# Patient Record
Sex: MALE | Race: WHITE | NOT HISPANIC OR LATINO | Employment: OTHER | URBAN - METROPOLITAN AREA
[De-identification: names, ages, dates, MRNs, and addresses within clinical notes are randomized per-mention and may not be internally consistent; named-entity substitution may affect disease eponyms.]

---

## 2019-07-07 PROCEDURE — 87086 URINE CULTURE/COLONY COUNT: CPT | Performed by: NURSE PRACTITIONER

## 2019-07-07 PROCEDURE — 87186 SC STD MICRODIL/AGAR DIL: CPT | Performed by: NURSE PRACTITIONER

## 2019-07-07 PROCEDURE — 87088 URINE BACTERIA CULTURE: CPT | Performed by: NURSE PRACTITIONER

## 2019-07-09 ENCOUNTER — TELEPHONE (OUTPATIENT)
Dept: URGENT CARE | Facility: CLINIC | Age: 69
End: 2019-07-09

## 2019-07-09 NOTE — TELEPHONE ENCOUNTER
Spoke with Pt informed urine culture grew bacteria and the antibiotic prescribed should treat that. Pt verbalized understanding stated he was still having slight burning with urination. Informed Pt to complete antibiotic and if symptoms still persists follow up with PCP. Pt verbalized understanding no further questions

## 2019-07-17 ENCOUNTER — TRANSCRIBE ORDERS (OUTPATIENT)
Dept: ADMINISTRATIVE | Facility: HOSPITAL | Age: 69
End: 2019-07-17

## 2019-07-17 DIAGNOSIS — R31.9 HEMATURIA, UNSPECIFIED TYPE: Primary | ICD-10-CM

## 2019-07-30 ENCOUNTER — APPOINTMENT (OUTPATIENT)
Dept: CT IMAGING | Facility: HOSPITAL | Age: 69
End: 2019-07-30

## 2019-11-22 ENCOUNTER — HOSPITAL ENCOUNTER (OUTPATIENT)
Dept: GENERAL RADIOLOGY | Facility: HOSPITAL | Age: 69
Discharge: HOME | End: 2019-11-22
Admitting: FAMILY MEDICINE

## 2019-11-22 ENCOUNTER — TRANSCRIBE ORDERS (OUTPATIENT)
Dept: ADMINISTRATIVE | Facility: HOSPITAL | Age: 69
End: 2019-11-22

## 2019-11-22 DIAGNOSIS — I10 BENIGN ESSENTIAL HTN: Primary | ICD-10-CM

## 2019-11-22 DIAGNOSIS — I10 BENIGN ESSENTIAL HTN: ICD-10-CM

## 2019-11-22 PROCEDURE — 71046 X-RAY EXAM CHEST 2 VIEWS: CPT

## 2020-10-15 ENCOUNTER — HOSPITAL ENCOUNTER (OUTPATIENT)
Dept: GENERAL RADIOLOGY | Facility: HOSPITAL | Age: 70
Discharge: HOME | End: 2020-10-15
Admitting: FAMILY MEDICINE

## 2020-10-15 ENCOUNTER — TRANSCRIBE ORDERS (OUTPATIENT)
Dept: ADMINISTRATIVE | Facility: HOSPITAL | Age: 70
End: 2020-10-15

## 2020-10-15 DIAGNOSIS — M16.12 OSTEOARTHRITIS OF LEFT HIP, UNSPECIFIED OSTEOARTHRITIS TYPE: Primary | ICD-10-CM

## 2020-10-15 PROCEDURE — 73502 X-RAY EXAM HIP UNI 2-3 VIEWS: CPT

## 2021-06-25 ENCOUNTER — OFFICE VISIT (OUTPATIENT)
Dept: NEUROSURGERY | Facility: CLINIC | Age: 71
End: 2021-06-25

## 2021-06-25 VITALS — BODY MASS INDEX: 24.79 KG/M2 | HEIGHT: 70 IN | WEIGHT: 173.2 LBS | TEMPERATURE: 97.5 F

## 2021-06-25 DIAGNOSIS — M50.30 DDD (DEGENERATIVE DISC DISEASE), CERVICAL: ICD-10-CM

## 2021-06-25 DIAGNOSIS — M47.12 CERVICAL SPONDYLOSIS WITH MYELOPATHY: Primary | ICD-10-CM

## 2021-06-25 DIAGNOSIS — M48.02 CERVICAL SPINAL STENOSIS: ICD-10-CM

## 2021-06-25 PROCEDURE — 99203 OFFICE O/P NEW LOW 30 MIN: CPT | Performed by: NEUROLOGICAL SURGERY

## 2021-06-25 RX ORDER — LOSARTAN POTASSIUM 50 MG/1
50 TABLET ORAL DAILY
COMMUNITY
Start: 2021-05-30

## 2021-06-25 RX ORDER — TAMSULOSIN HYDROCHLORIDE 0.4 MG/1
1 CAPSULE ORAL
COMMUNITY
Start: 2021-05-30 | End: 2022-10-30

## 2021-06-25 RX ORDER — ASPIRIN 81 MG/1
81 TABLET, CHEWABLE ORAL DAILY
COMMUNITY
End: 2022-12-30

## 2021-06-25 RX ORDER — METRONIDAZOLE 10 MG/G
GEL TOPICAL
COMMUNITY
Start: 2021-04-15 | End: 2021-06-25

## 2021-06-25 RX ORDER — DIPHENOXYLATE HYDROCHLORIDE AND ATROPINE SULFATE 2.5; .025 MG/1; MG/1
1 TABLET ORAL DAILY
COMMUNITY

## 2021-06-25 RX ORDER — HYDROCHLOROTHIAZIDE 12.5 MG/1
12.5 CAPSULE, GELATIN COATED ORAL DAILY
COMMUNITY
Start: 2021-05-30

## 2021-06-25 RX ORDER — ASCORBIC ACID 500 MG
500 TABLET ORAL DAILY
COMMUNITY

## 2021-06-25 NOTE — PROGRESS NOTES
Patient: Vladimir Haq  : 1950    Primary Care Provider: Jim Lopez MD    Requesting Provider: As above        History    Chief Complaint: Gait dysfunction.    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  At that time he was having left arm and leg symptoms.  ACDF at C4-5 was pursued.  Over the last 6 months he developed more gait problems.  He has no arm weakness, numbness, pain.  He denies any bowel or bladder dysfunction.  He is not sure what makes his symptoms better or worse.  He tends to perform less well when he is fatigued.    Review of Systems   Constitutional: Negative for activity change, appetite change, chills, diaphoresis, fatigue, fever and unexpected weight change.   HENT: Negative for congestion, dental problem, drooling, ear discharge, ear pain, facial swelling, hearing loss, mouth sores, nosebleeds, postnasal drip, rhinorrhea, sinus pressure, sinus pain, sneezing, sore throat, tinnitus, trouble swallowing and voice change.    Eyes: Negative for photophobia, pain, discharge, redness, itching and visual disturbance.   Respiratory: Negative for apnea, cough, choking, chest tightness, shortness of breath, wheezing and stridor.    Cardiovascular: Negative for chest pain, palpitations and leg swelling.   Gastrointestinal: Negative for abdominal distention, abdominal pain, anal bleeding, blood in stool, constipation, diarrhea, nausea, rectal pain and vomiting.   Endocrine: Negative for cold intolerance, heat intolerance, polydipsia, polyphagia and polyuria.   Genitourinary: Negative for decreased urine volume, difficulty urinating, dysuria, enuresis, flank pain, frequency, genital sores, hematuria and urgency.   Musculoskeletal: Negative for arthralgias, back pain, gait problem, joint swelling, myalgias, neck pain and neck stiffness.   Skin: Negative for color change, pallor,  "rash and wound.   Allergic/Immunologic: Negative for environmental allergies, food allergies and immunocompromised state.   Neurological: Positive for weakness. Negative for dizziness, tremors, seizures, syncope, facial asymmetry, speech difficulty, light-headedness, numbness and headaches.   Hematological: Negative for adenopathy. Does not bruise/bleed easily.   Psychiatric/Behavioral: Negative for agitation, behavioral problems, confusion, decreased concentration, dysphoric mood, hallucinations, self-injury, sleep disturbance and suicidal ideas. The patient is not nervous/anxious and is not hyperactive.    All other systems reviewed and are negative.      The patient's past medical history, past surgical history, family history, and social history have been reviewed at length in the electronic medical record.    Physical Exam:   Temp 97.5 °F (36.4 °C)   Ht 177.8 cm (70\")   Wt 78.6 kg (173 lb 3.2 oz)   BMI 24.85 kg/m²   CONSTITUTIONAL: Patient is well-nourished, pleasant and appears stated age.  CV: Heart regular rate and rhythm without murmur, rub, or gallop.  PULMONARY: Lungs are clear to ascultation.  MUSCULOSKELETAL:  Neck tenderness to palpation is not observed.   ROM in the neck is normal.  NEUROLOGICAL:  Orientation, memory, attention span, language function, and cognition have been examined and are intact.  Strength is intact in the upper and lower extremities to direct testing.  Muscle tone is normal throughout.  His gait is spastic.  Sensation is intact to light touch testing throughout.  Deep tendon reflexes are 2+ in the upper extremities except the left tricep reflex which is quite brisk.  Lower extremity reflexes are brisk throughout..  Alcira's Sign is negative bilaterally. No clonus is elicited.  Coordination is intact.      Medical Decision Making    Data Review:   (All imaging studies were personally reviewed unless stated otherwise)  MRI of the cervical spine demonstrates instrumented fusion " that is solid at C4-5.  There are some subtle signal change within the cord at that level.  There is stenosis at C3-4, C5-6, and C6-7.  There is modest signal change in the cord at C3-4.  There is a low-grade offset of C6 on C7.    Diagnosis:   Cervical myelopathy due to spondylosis and stenosis.    Treatment Options:   For completeness I am going to check a thoracic MRI to make sure that he does not have another compressive region.  If not then he is likely going to require cervical decompression with fusion and stabilization from C3-C7 dorsally.  He does have travel planned at the end of August.       Diagnosis Plan   1. Cervical spondylosis with myelopathy  MRI Thoracic Spine Without Contrast   2. Cervical spinal stenosis     3. DDD (degenerative disc disease), cervical         Scribed for Osmani Mckeon MD by TERRI Gomez 6/25/2021 11:28 EDT      I, Dr. Mckeon, personally performed the services described in the documentation, as scribed in my presence, and it is both accurate and complete.

## 2021-06-27 ENCOUNTER — HOSPITAL ENCOUNTER (OUTPATIENT)
Dept: MRI IMAGING | Facility: HOSPITAL | Age: 71
Discharge: HOME | End: 2021-06-27
Admitting: NEUROLOGICAL SURGERY

## 2021-06-27 DIAGNOSIS — M47.12 CERVICAL SPONDYLOSIS WITH MYELOPATHY: ICD-10-CM

## 2021-06-27 PROCEDURE — 72146 MRI CHEST SPINE W/O DYE: CPT

## 2021-07-02 ENCOUNTER — PREP FOR SURGERY (OUTPATIENT)
Dept: OTHER | Facility: HOSPITAL | Age: 71
End: 2021-07-02

## 2021-07-02 ENCOUNTER — OFFICE VISIT (OUTPATIENT)
Dept: NEUROSURGERY | Facility: CLINIC | Age: 71
End: 2021-07-02

## 2021-07-02 VITALS — BODY MASS INDEX: 24.77 KG/M2 | HEIGHT: 70 IN | TEMPERATURE: 97.5 F | WEIGHT: 173 LBS

## 2021-07-02 DIAGNOSIS — M53.9 MULTILEVEL DEGENERATIVE DISC DISEASE: ICD-10-CM

## 2021-07-02 DIAGNOSIS — R26.9 NEUROLOGIC GAIT DYSFUNCTION: ICD-10-CM

## 2021-07-02 DIAGNOSIS — M47.12 CERVICAL SPONDYLOSIS WITH MYELOPATHY: ICD-10-CM

## 2021-07-02 DIAGNOSIS — M48.02 CERVICAL SPINAL STENOSIS: Primary | ICD-10-CM

## 2021-07-02 DIAGNOSIS — M47.12 CERVICAL SPONDYLOSIS WITH MYELOPATHY: Primary | ICD-10-CM

## 2021-07-02 PROCEDURE — 99214 OFFICE O/P EST MOD 30 MIN: CPT | Performed by: NEUROLOGICAL SURGERY

## 2021-07-02 RX ORDER — OXYCODONE HCL 10 MG/1
10 TABLET, FILM COATED, EXTENDED RELEASE ORAL ONCE
Status: CANCELLED | OUTPATIENT
Start: 2021-07-02 | End: 2021-07-02

## 2021-07-02 RX ORDER — ACETAMINOPHEN 500 MG
1000 TABLET ORAL ONCE
Status: CANCELLED | OUTPATIENT
Start: 2021-07-02 | End: 2021-07-02

## 2021-07-02 RX ORDER — IBUPROFEN 800 MG/1
800 TABLET ORAL ONCE
Status: CANCELLED | OUTPATIENT
Start: 2021-07-02 | End: 2021-07-02

## 2021-07-02 RX ORDER — FAMOTIDINE 20 MG/1
20 TABLET, FILM COATED ORAL
Status: CANCELLED | OUTPATIENT
Start: 2021-07-02

## 2021-07-02 RX ORDER — CEFAZOLIN SODIUM 2 G/100ML
2 INJECTION, SOLUTION INTRAVENOUS ONCE
Status: CANCELLED | OUTPATIENT
Start: 2021-07-02 | End: 2021-07-02

## 2021-07-02 NOTE — PROGRESS NOTES
Patient: Vladimir Haq  : 1950    Primary Care Provider: Jim Lopez MD    Requesting Provider: As above        History    Chief Complaint: Gait dysfunction.    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  At that time he was having left arm and leg symptoms.  ACDF at C4-5 was pursued.  Over the last 6 months he developed more gait problems.  He has no arm weakness, numbness, pain.  He denies any bowel or bladder dysfunction.  He is not sure what makes his symptoms better or worse.  He tends to perform less well when he is fatigued.  He reports no change in his symptoms.    Review of Systems   Constitutional: Negative for activity change, appetite change, chills, diaphoresis, fatigue, fever and unexpected weight change.   HENT: Negative for congestion, dental problem, drooling, ear discharge, ear pain, facial swelling, hearing loss, mouth sores, nosebleeds, postnasal drip, rhinorrhea, sinus pressure, sinus pain, sneezing, sore throat, tinnitus, trouble swallowing and voice change.    Eyes: Negative for photophobia, pain, discharge, redness, itching and visual disturbance.   Respiratory: Negative for apnea, cough, choking, chest tightness, shortness of breath, wheezing and stridor.    Cardiovascular: Negative for chest pain, palpitations and leg swelling.   Gastrointestinal: Negative for abdominal distention, abdominal pain, anal bleeding, blood in stool, constipation, diarrhea, nausea, rectal pain and vomiting.   Endocrine: Negative for cold intolerance, heat intolerance, polydipsia, polyphagia and polyuria.   Genitourinary: Negative for decreased urine volume, difficulty urinating, dysuria, enuresis, flank pain, frequency, genital sores, hematuria and urgency.   Musculoskeletal: Negative for arthralgias, back pain, gait problem, joint swelling, myalgias, neck pain and neck stiffness.  "  Skin: Negative for color change, pallor, rash and wound.   Allergic/Immunologic: Negative for environmental allergies, food allergies and immunocompromised state.   Neurological: Positive for weakness. Negative for dizziness, tremors, seizures, syncope, facial asymmetry, speech difficulty, light-headedness, numbness and headaches.   Hematological: Negative for adenopathy. Does not bruise/bleed easily.   Psychiatric/Behavioral: Negative for agitation, behavioral problems, confusion, decreased concentration, dysphoric mood, hallucinations, self-injury, sleep disturbance and suicidal ideas. The patient is not nervous/anxious and is not hyperactive.    All other systems reviewed and are negative.      The patient's past medical history, past surgical history, family history, and social history have been reviewed at length in the electronic medical record.    Physical Exam:   Temp 97.5 °F (36.4 °C) (Infrared)   Ht 177.8 cm (70\")   Wt 78.5 kg (173 lb)   BMI 24.82 kg/m²   MUSCULOSKELETAL:  Neck tenderness to palpation is not observed.   ROM in the neck is normal.  NEUROLOGICAL:  Strength is intact in the upper and lower extremities to direct testing.  Muscle tone is normal throughout.  Station and gait are somewhat spastic.  Deep tendon reflexes are 1+ and symmetrical.  Alcira's Sign is negative bilaterally.       Medical Decision Making    Data Review:   (All imaging studies were personally reviewed unless stated otherwise)  Thoracic MRI study demonstrates some kyphosis with diffuse degenerative disc disease and spondylosis.  There is no cord compromise.    MRI of the cervical spine demonstrates instrumented fusion that is solid at C4-5.  There are some subtle signal change within the cord at that level.  There is stenosis at C3-4, C5-6, and C6-7.  There is modest signal change in the cord at C3-4.  There is a low-grade offset of C6 on C7.    Diagnosis:   Cervical spondylosis and stenosis with " myelopathy.    Treatment Options:   I have recommended C3-7 decompression with fusion and stabilization.  The goal of surgery is to stabilize his myelopathy.  Surgery may or may not correct his current gait difficulties.  The nature of the procedure as well as the potential risks, complications, limitations, and alternatives to the procedure were discussed at length with the patient and the patient has agreed to proceed with surgery.  He does have a trip planned at the end of August and we will try and work around that.       Diagnosis Plan   1. Cervical spinal stenosis     2. Neurologic gait dysfunction     3. Cervical spondylosis with myelopathy     4. Multilevel degenerative disc disease         Scribed for Osmani Mckeon MD by Tari Arias CMA. 7/2/2021 16:11 EDT    I, Dr. Mckeon, personally performed the services described in the documentation, as scribed in my presence, and it is both accurate and complete.

## 2021-07-02 NOTE — H&P
Patient: Vladimir Haq  : 1950     Primary Care Provider: Jim Lopez MD     Requesting Provider: As above           History     Chief Complaint: Gait dysfunction.     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  At that time he was having left arm and leg symptoms.  ACDF at C4-5 was pursued.  Over the last 6 months he developed more gait problems.  He has no arm weakness, numbness, pain.  He denies any bowel or bladder dysfunction.  He is not sure what makes his symptoms better or worse.  He tends to perform less well when he is fatigued.  He reports no change in his symptoms.     Review of Systems   Constitutional: Negative for activity change, appetite change, chills, diaphoresis, fatigue, fever and unexpected weight change.   HENT: Negative for congestion, dental problem, drooling, ear discharge, ear pain, facial swelling, hearing loss, mouth sores, nosebleeds, postnasal drip, rhinorrhea, sinus pressure, sinus pain, sneezing, sore throat, tinnitus, trouble swallowing and voice change.    Eyes: Negative for photophobia, pain, discharge, redness, itching and visual disturbance.   Respiratory: Negative for apnea, cough, choking, chest tightness, shortness of breath, wheezing and stridor.    Cardiovascular: Negative for chest pain, palpitations and leg swelling.   Gastrointestinal: Negative for abdominal distention, abdominal pain, anal bleeding, blood in stool, constipation, diarrhea, nausea, rectal pain and vomiting.   Endocrine: Negative for cold intolerance, heat intolerance, polydipsia, polyphagia and polyuria.   Genitourinary: Negative for decreased urine volume, difficulty urinating, dysuria, enuresis, flank pain, frequency, genital sores, hematuria and urgency.   Musculoskeletal: Negative for arthralgias, back pain, gait problem, joint swelling, myalgias, neck pain and neck  stiffness.   Skin: Negative for color change, pallor, rash and wound.   Allergic/Immunologic: Negative for environmental allergies, food allergies and immunocompromised state.   Neurological: Positive for weakness. Negative for dizziness, tremors, seizures, syncope, facial asymmetry, speech difficulty, light-headedness, numbness and headaches.   Hematological: Negative for adenopathy. Does not bruise/bleed easily.   Psychiatric/Behavioral: Negative for agitation, behavioral problems, confusion, decreased concentration, dysphoric mood, hallucinations, self-injury, sleep disturbance and suicidal ideas. The patient is not nervous/anxious and is not hyperactive.    All other systems reviewed and are negative.        The patient's past medical history, past surgical history, family history, and social history have been reviewed at length in the electronic medical record.     Past Medical History:   Diagnosis Date   • Arthritis    • Hyperlipidemia      Past Surgical History:   Procedure Laterality Date   • CERVICAL DISC SURGERY  2002    Dr. Robbins, [facility]      Family History   Problem Relation Age of Onset   • Cancer Mother    • Stroke Father      Social History     Socioeconomic History   • Marital status:      Spouse name: Not on file   • Number of children: Not on file   • Years of education: Not on file   • Highest education level: Not on file   Tobacco Use   • Smoking status: Never Smoker   • Smokeless tobacco: Never Used   Vaping Use   • Vaping Use: Never used   Substance and Sexual Activity   • Alcohol use: Yes     Comment: 14 drinks per week.   • Drug use: Never   • Sexual activity: Defer       No Known Allergies    Current Outpatient Medications on File Prior to Visit   Medication Sig Dispense Refill   • ascorbic acid (VITAMIN C) 500 MG tablet Take 500 mg by mouth Daily.     • aspirin 81 MG chewable tablet Chew 81 mg Daily.     • hydroCHLOROthiazide (MICROZIDE) 12.5 MG capsule Take 12.5 mg by  "mouth Daily.     • losartan (COZAAR) 50 MG tablet Take 50 mg by mouth Daily.     • lovastatin (ALTOPREV) 40 MG 24 hr tablet Take 40 mg by mouth Every Night.     • multivitamin (MULTI VITAMIN PO) Take 1 tablet by mouth Daily.     • tamsulosin (FLOMAX) 0.4 MG capsule 24 hr capsule Take 1 capsule by mouth every night at bedtime.       No current facility-administered medications on file prior to visit.        Physical Exam:   Temp 97.5 °F (36.4 °C) (Infrared)   Ht 177.8 cm (70\")   Wt 78.5 kg (173 lb)   BMI 24.82 kg/m²   MUSCULOSKELETAL:  Neck tenderness to palpation is not observed.   ROM in the neck is normal.  NEUROLOGICAL:  Strength is intact in the upper and lower extremities to direct testing.  Muscle tone is normal throughout.  Station and gait are somewhat spastic.  Deep tendon reflexes are 1+ and symmetrical.  Alcira's Sign is negative bilaterally.         Medical Decision Making     Data Review:   (All imaging studies were personally reviewed unless stated otherwise)  Thoracic MRI study demonstrates some kyphosis with diffuse degenerative disc disease and spondylosis.  There is no cord compromise.     MRI of the cervical spine demonstrates instrumented fusion that is solid at C4-5.  There are some subtle signal change within the cord at that level.  There is stenosis at C3-4, C5-6, and C6-7.  There is modest signal change in the cord at C3-4.  There is a low-grade offset of C6 on C7.     Diagnosis:   Cervical spondylosis and stenosis with myelopathy.     Treatment Options:   I have recommended C3-7 decompression with fusion and stabilization.  The goal of surgery is to stabilize his myelopathy.  Surgery may or may not correct his current gait difficulties.  The nature of the procedure as well as the potential risks, complications, limitations, and alternatives to the procedure were discussed at length with the patient and the patient has agreed to proceed with surgery.  He does have a trip planned at the " end of August and we will try and work around that.          Diagnosis Plan   1. Cervical spinal stenosis      2. Neurologic gait dysfunction      3. Cervical spondylosis with myelopathy      4. Multilevel degenerative disc disease

## 2021-07-20 ENCOUNTER — TELEPHONE (OUTPATIENT)
Dept: NEUROSURGERY | Facility: CLINIC | Age: 71
End: 2021-07-20

## 2021-07-20 RX ORDER — CHLORHEXIDINE GLUCONATE 4 G/100ML
SOLUTION TOPICAL DAILY
Qty: 120 ML | Refills: 0 | Status: ON HOLD | OUTPATIENT
Start: 2021-07-20 | End: 2021-08-12

## 2021-07-20 NOTE — TELEPHONE ENCOUNTER
Patient was not able to open his computer to retrieve all his questions, however the main one was is he going to be able to turn his head after the fusion?    Once he is able to open his notes, he will leave the other questions on the voice mail.

## 2021-07-20 NOTE — TELEPHONE ENCOUNTER
"Patient called about his prescriptions. I tried to get specifics about what type of questions he had. He just said \"I have more questions\". His call back number is [phone].  "

## 2021-07-21 NOTE — TELEPHONE ENCOUNTER
Yes, he will still have ROM   - may be slightly less, however goal of surgery is to stabilize his myelopathy and prevent decline

## 2021-07-28 ENCOUNTER — TELEPHONE (OUTPATIENT)
Dept: NEUROSURGERY | Facility: CLINIC | Age: 71
End: 2021-07-28

## 2021-07-28 NOTE — TELEPHONE ENCOUNTER
Called and answered all questions.    He was thankful for the call.    We will see him in surgery.

## 2021-08-10 ENCOUNTER — PRE-ADMISSION TESTING (OUTPATIENT)
Dept: PREADMISSION TESTING | Facility: HOSPITAL | Age: 71
End: 2021-08-10

## 2021-08-10 VITALS — WEIGHT: 171.96 LBS | HEIGHT: 70 IN | BODY MASS INDEX: 24.62 KG/M2

## 2021-08-10 LAB
DEPRECATED RDW RBC AUTO: 44.8 FL (ref 37–54)
ERYTHROCYTE [DISTWIDTH] IN BLOOD BY AUTOMATED COUNT: 13.6 % (ref 12.3–15.4)
HBA1C MFR BLD: 5.5 % (ref 4.8–5.6)
HCT VFR BLD AUTO: 45.5 % (ref 37.5–51)
HGB BLD-MCNC: 15.5 G/DL (ref 13–17.7)
MCH RBC QN AUTO: 30.3 PG (ref 26.6–33)
MCHC RBC AUTO-ENTMCNC: 34.1 G/DL (ref 31.5–35.7)
MCV RBC AUTO: 88.9 FL (ref 79–97)
MRSA DNA SPEC QL NAA+PROBE: NEGATIVE
PLATELET # BLD AUTO: 237 10*3/MM3 (ref 140–450)
PMV BLD AUTO: 9.8 FL (ref 6–12)
POTASSIUM SERPL-SCNC: 3.9 MMOL/L (ref 3.5–5.2)
RBC # BLD AUTO: 5.12 10*6/MM3 (ref 4.14–5.8)
SARS-COV-2 RNA PNL SPEC NAA+PROBE: NOT DETECTED
WBC # BLD AUTO: 5.53 10*3/MM3 (ref 3.4–10.8)

## 2021-08-10 PROCEDURE — 84132 ASSAY OF SERUM POTASSIUM: CPT

## 2021-08-10 PROCEDURE — 85027 COMPLETE CBC AUTOMATED: CPT

## 2021-08-10 PROCEDURE — 83036 HEMOGLOBIN GLYCOSYLATED A1C: CPT

## 2021-08-10 PROCEDURE — U0004 COV-19 TEST NON-CDC HGH THRU: HCPCS

## 2021-08-10 PROCEDURE — C9803 HOPD COVID-19 SPEC COLLECT: HCPCS

## 2021-08-10 PROCEDURE — 36415 COLL VENOUS BLD VENIPUNCTURE: CPT

## 2021-08-10 PROCEDURE — 87641 MR-STAPH DNA AMP PROBE: CPT

## 2021-08-10 RX ORDER — METRONIDAZOLE 10 MG/G
1 GEL TOPICAL DAILY PRN
COMMUNITY
End: 2022-10-30

## 2021-08-10 NOTE — PAT
Patient to apply Chlorhexadine wipes  to surgical area (as instructed) the night before procedure and the AM of procedure. Wipes provided.    Patient instructed to drink 20 ounces (or until full) of Gatorade and it needs to be completed 1 hour (for Main OR patients) or 2 hours (scheduled  section patients) before given arrival time for procedure (NO RED Gatorade)    Patient verbalized understanding.    Bactroban and Chlorhexidine Prescription prescribed by physician before PAT visit.  Verified with patient that medications were picked up from their pharmacy.  Written instructions given to patient during PAT visit.  Patient/family also instructed to complete skin prep checklist and return the checklist on the day of surgery to preoperative staff.  Patient/family verbalized understanding.    Per Anesthesia Request, patient instructed not to take their ACE/ARB medications on the AM of surgery.

## 2021-08-12 ENCOUNTER — ANESTHESIA (OUTPATIENT)
Dept: PERIOP | Facility: HOSPITAL | Age: 71
End: 2021-08-12

## 2021-08-12 ENCOUNTER — APPOINTMENT (OUTPATIENT)
Dept: GENERAL RADIOLOGY | Facility: HOSPITAL | Age: 71
End: 2021-08-12

## 2021-08-12 ENCOUNTER — HOSPITAL ENCOUNTER (INPATIENT)
Facility: HOSPITAL | Age: 71
LOS: 2 days | Discharge: HOME | End: 2021-08-14
Attending: NEUROLOGICAL SURGERY | Admitting: NEUROLOGICAL SURGERY

## 2021-08-12 ENCOUNTER — ANESTHESIA EVENT (OUTPATIENT)
Dept: PERIOP | Facility: HOSPITAL | Age: 71
End: 2021-08-12

## 2021-08-12 DIAGNOSIS — M47.12 CERVICAL SPONDYLOSIS WITH MYELOPATHY: ICD-10-CM

## 2021-08-12 PROCEDURE — 25010000003 CEFAZOLIN IN DEXTROSE 2-4 GM/100ML-% SOLUTION: Performed by: NEUROLOGICAL SURGERY

## 2021-08-12 PROCEDURE — C1713 ANCHOR/SCREW BN/BN,TIS/BN: HCPCS | Performed by: NEUROLOGICAL SURGERY

## 2021-08-12 PROCEDURE — 63048 LAM FACETEC &FORAMOT EA ADDL: CPT | Performed by: PHYSICIAN ASSISTANT

## 2021-08-12 PROCEDURE — 0RG2071 FUSION OF 2 OR MORE CERVICAL VERTEBRAL JOINTS WITH AUTOLOGOUS TISSUE SUBSTITUTE, POSTERIOR APPROACH, POSTERIOR COLUMN, OPEN APPROACH: ICD-10-PCS | Performed by: NEUROLOGICAL SURGERY

## 2021-08-12 PROCEDURE — 25010000002 DEXAMETHASONE PER 1 MG: Performed by: NURSE ANESTHETIST, CERTIFIED REGISTERED

## 2021-08-12 PROCEDURE — 63048 LAM FACETEC &FORAMOT EA ADDL: CPT | Performed by: NEUROLOGICAL SURGERY

## 2021-08-12 PROCEDURE — C1889 IMPLANT/INSERT DEVICE, NOC: HCPCS | Performed by: NEUROLOGICAL SURGERY

## 2021-08-12 PROCEDURE — 25010000002 VANCOMYCIN 1 G RECONSTITUTED SOLUTION: Performed by: NEUROLOGICAL SURGERY

## 2021-08-12 PROCEDURE — A9270 NON-COVERED ITEM OR SERVICE: HCPCS | Performed by: ANESTHESIOLOGY

## 2021-08-12 PROCEDURE — A9270 NON-COVERED ITEM OR SERVICE: HCPCS | Performed by: NEUROLOGICAL SURGERY

## 2021-08-12 PROCEDURE — 22842 INSERT SPINE FIXATION DEVICE: CPT | Performed by: NEUROLOGICAL SURGERY

## 2021-08-12 PROCEDURE — 22600 ARTHRD PST TQ 1NTRSPC CRV: CPT | Performed by: PHYSICIAN ASSISTANT

## 2021-08-12 PROCEDURE — 25010000003 BUPIVACAINE LIPOSOME 1.3 % SUSPENSION: Performed by: NEUROLOGICAL SURGERY

## 2021-08-12 PROCEDURE — 63710000001 OXYCODONE 10 MG TABLET EXTENDED-RELEASE 12 HOUR: Performed by: NEUROLOGICAL SURGERY

## 2021-08-12 PROCEDURE — 25010000002 ONDANSETRON PER 1 MG: Performed by: NEUROLOGICAL SURGERY

## 2021-08-12 PROCEDURE — C9290 INJ, BUPIVACAINE LIPOSOME: HCPCS | Performed by: NEUROLOGICAL SURGERY

## 2021-08-12 PROCEDURE — 22614 ARTHRD PST TQ 1NTRSPC EA ADD: CPT | Performed by: PHYSICIAN ASSISTANT

## 2021-08-12 PROCEDURE — 25010000002 FENTANYL CITRATE (PF) 50 MCG/ML SOLUTION: Performed by: NURSE ANESTHETIST, CERTIFIED REGISTERED

## 2021-08-12 PROCEDURE — 61783 SCAN PROC SPINAL: CPT | Performed by: NEUROLOGICAL SURGERY

## 2021-08-12 PROCEDURE — 00NW0ZZ RELEASE CERVICAL SPINAL CORD, OPEN APPROACH: ICD-10-PCS | Performed by: NEUROLOGICAL SURGERY

## 2021-08-12 PROCEDURE — 63047 LAM FACETEC & FORAMOT LUMBAR: CPT | Performed by: NEUROLOGICAL SURGERY

## 2021-08-12 PROCEDURE — 63710000001 FAMOTIDINE 20 MG TABLET: Performed by: ANESTHESIOLOGY

## 2021-08-12 PROCEDURE — 25010000002 ONDANSETRON PER 1 MG: Performed by: NURSE ANESTHETIST, CERTIFIED REGISTERED

## 2021-08-12 PROCEDURE — 22614 ARTHRD PST TQ 1NTRSPC EA ADD: CPT | Performed by: NEUROLOGICAL SURGERY

## 2021-08-12 PROCEDURE — 22842 INSERT SPINE FIXATION DEVICE: CPT | Performed by: PHYSICIAN ASSISTANT

## 2021-08-12 PROCEDURE — 25010000002 PROPOFOL 10 MG/ML EMULSION: Performed by: NURSE ANESTHETIST, CERTIFIED REGISTERED

## 2021-08-12 PROCEDURE — 63710000001 IBUPROFEN 800 MG TABLET: Performed by: NEUROLOGICAL SURGERY

## 2021-08-12 PROCEDURE — 63710000001 ACETAMINOPHEN 500 MG TABLET: Performed by: NEUROLOGICAL SURGERY

## 2021-08-12 PROCEDURE — 22600 ARTHRD PST TQ 1NTRSPC CRV: CPT | Performed by: NEUROLOGICAL SURGERY

## 2021-08-12 PROCEDURE — 63047 LAM FACETEC & FORAMOT LUMBAR: CPT | Performed by: PHYSICIAN ASSISTANT

## 2021-08-12 PROCEDURE — 25010000002 HYDROMORPHONE PER 4 MG: Performed by: NURSE ANESTHETIST, CERTIFIED REGISTERED

## 2021-08-12 DEVICE — MULTI AXIAL SCREW 3603524 3.5 X 24MM
Type: IMPLANTABLE DEVICE | Site: SPINE CERVICAL | Status: FUNCTIONAL
Brand: INFINITY™ OCCIPITOCERVICAL UPPER THORACIC SYSTEM

## 2021-08-12 DEVICE — FLOSEAL HEMOSTATIC MATRIX, 10ML
Type: IMPLANTABLE DEVICE | Site: SPINE CERVICAL | Status: FUNCTIONAL
Brand: FLOSEAL HEMOSTATIC MATRIX

## 2021-08-12 DEVICE — PUTTY DBM GRAFTON 6CC: Type: IMPLANTABLE DEVICE | Site: SPINE CERVICAL | Status: FUNCTIONAL

## 2021-08-12 DEVICE — DBM T44150 10CC ORTHOBLEND SMALL DEFGRAF
Type: IMPLANTABLE DEVICE | Site: SPINE CERVICAL | Status: FUNCTIONAL
Brand: GRAFTON®AND GRAFTON PLUS®DEMINERALIZED BONE MATRIX (DBM)

## 2021-08-12 DEVICE — HEMOST ABS SURGIFOAM SZ100 8X12 10MM: Type: IMPLANTABLE DEVICE | Site: SPINE CERVICAL | Status: FUNCTIONAL

## 2021-08-12 RX ORDER — LOSARTAN POTASSIUM 50 MG/1
50 TABLET ORAL DAILY
Status: DISCONTINUED | OUTPATIENT
Start: 2021-08-12 | End: 2021-08-14 | Stop reason: HOSPADM

## 2021-08-12 RX ORDER — NALOXONE HCL 0.4 MG/ML
0.4 VIAL (ML) INJECTION
Status: DISCONTINUED | OUTPATIENT
Start: 2021-08-12 | End: 2021-08-14 | Stop reason: HOSPADM

## 2021-08-12 RX ORDER — SODIUM CHLORIDE 0.9 % (FLUSH) 0.9 %
3 SYRINGE (ML) INJECTION EVERY 12 HOURS SCHEDULED
Status: DISCONTINUED | OUTPATIENT
Start: 2021-08-12 | End: 2021-08-14 | Stop reason: HOSPADM

## 2021-08-12 RX ORDER — ACETAMINOPHEN 500 MG
1000 TABLET ORAL ONCE
Status: COMPLETED | OUTPATIENT
Start: 2021-08-12 | End: 2021-08-12

## 2021-08-12 RX ORDER — ROCURONIUM BROMIDE 10 MG/ML
INJECTION, SOLUTION INTRAVENOUS AS NEEDED
Status: DISCONTINUED | OUTPATIENT
Start: 2021-08-12 | End: 2021-08-12 | Stop reason: SURG

## 2021-08-12 RX ORDER — SODIUM CHLORIDE 9 MG/ML
INJECTION, SOLUTION INTRAVENOUS AS NEEDED
Status: DISCONTINUED | OUTPATIENT
Start: 2021-08-12 | End: 2021-08-12 | Stop reason: HOSPADM

## 2021-08-12 RX ORDER — FENTANYL CITRATE 50 UG/ML
50 INJECTION, SOLUTION INTRAMUSCULAR; INTRAVENOUS
Status: DISCONTINUED | OUTPATIENT
Start: 2021-08-12 | End: 2021-08-12 | Stop reason: HOSPADM

## 2021-08-12 RX ORDER — MAGNESIUM HYDROXIDE 1200 MG/15ML
LIQUID ORAL AS NEEDED
Status: DISCONTINUED | OUTPATIENT
Start: 2021-08-12 | End: 2021-08-12 | Stop reason: HOSPADM

## 2021-08-12 RX ORDER — CEFAZOLIN SODIUM 2 G/100ML
2 INJECTION, SOLUTION INTRAVENOUS ONCE
Status: COMPLETED | OUTPATIENT
Start: 2021-08-12 | End: 2021-08-12

## 2021-08-12 RX ORDER — ASPIRIN 81 MG/1
81 TABLET, CHEWABLE ORAL DAILY
Status: DISCONTINUED | OUTPATIENT
Start: 2021-08-13 | End: 2021-08-14 | Stop reason: HOSPADM

## 2021-08-12 RX ORDER — LIDOCAINE HYDROCHLORIDE 10 MG/ML
INJECTION, SOLUTION EPIDURAL; INFILTRATION; INTRACAUDAL; PERINEURAL AS NEEDED
Status: DISCONTINUED | OUTPATIENT
Start: 2021-08-12 | End: 2021-08-12 | Stop reason: SURG

## 2021-08-12 RX ORDER — LIDOCAINE HYDROCHLORIDE 10 MG/ML
0.5 INJECTION, SOLUTION EPIDURAL; INFILTRATION; INTRACAUDAL; PERINEURAL ONCE AS NEEDED
Status: COMPLETED | OUTPATIENT
Start: 2021-08-12 | End: 2021-08-12

## 2021-08-12 RX ORDER — SODIUM CHLORIDE 0.9 % (FLUSH) 0.9 %
10 SYRINGE (ML) INJECTION AS NEEDED
Status: CANCELLED | OUTPATIENT
Start: 2021-08-12

## 2021-08-12 RX ORDER — FENTANYL CITRATE 50 UG/ML
INJECTION, SOLUTION INTRAMUSCULAR; INTRAVENOUS AS NEEDED
Status: DISCONTINUED | OUTPATIENT
Start: 2021-08-12 | End: 2021-08-12 | Stop reason: SURG

## 2021-08-12 RX ORDER — VANCOMYCIN HYDROCHLORIDE 1 G/20ML
INJECTION, POWDER, LYOPHILIZED, FOR SOLUTION INTRAVENOUS AS NEEDED
Status: DISCONTINUED | OUTPATIENT
Start: 2021-08-12 | End: 2021-08-12 | Stop reason: HOSPADM

## 2021-08-12 RX ORDER — CEFAZOLIN SODIUM 2 G/100ML
2 INJECTION, SOLUTION INTRAVENOUS EVERY 8 HOURS
Status: COMPLETED | OUTPATIENT
Start: 2021-08-12 | End: 2021-08-13

## 2021-08-12 RX ORDER — OXYCODONE AND ACETAMINOPHEN 7.5; 325 MG/1; MG/1
1 TABLET ORAL EVERY 4 HOURS PRN
Status: DISCONTINUED | OUTPATIENT
Start: 2021-08-12 | End: 2021-08-14 | Stop reason: HOSPADM

## 2021-08-12 RX ORDER — SODIUM CHLORIDE 0.9 % (FLUSH) 0.9 %
10 SYRINGE (ML) INJECTION EVERY 12 HOURS SCHEDULED
Status: CANCELLED | OUTPATIENT
Start: 2021-08-12

## 2021-08-12 RX ORDER — ACETAMINOPHEN 325 MG/1
650 TABLET ORAL EVERY 4 HOURS PRN
Status: DISCONTINUED | OUTPATIENT
Start: 2021-08-12 | End: 2021-08-14 | Stop reason: HOSPADM

## 2021-08-12 RX ORDER — MORPHINE SULFATE 4 MG/ML
5 INJECTION, SOLUTION INTRAMUSCULAR; INTRAVENOUS EVERY 4 HOURS PRN
Status: DISCONTINUED | OUTPATIENT
Start: 2021-08-12 | End: 2021-08-14 | Stop reason: HOSPADM

## 2021-08-12 RX ORDER — TAMSULOSIN HYDROCHLORIDE 0.4 MG/1
0.4 CAPSULE ORAL DAILY
Status: DISCONTINUED | OUTPATIENT
Start: 2021-08-12 | End: 2021-08-14 | Stop reason: HOSPADM

## 2021-08-12 RX ORDER — MIDAZOLAM HYDROCHLORIDE 1 MG/ML
0.5 INJECTION INTRAMUSCULAR; INTRAVENOUS
Status: DISCONTINUED | OUTPATIENT
Start: 2021-08-12 | End: 2021-08-12 | Stop reason: HOSPADM

## 2021-08-12 RX ORDER — FAMOTIDINE 10 MG/ML
20 INJECTION, SOLUTION INTRAVENOUS ONCE
Status: CANCELLED | OUTPATIENT
Start: 2021-08-12 | End: 2021-08-12

## 2021-08-12 RX ORDER — IBUPROFEN 800 MG/1
800 TABLET ORAL ONCE
Status: COMPLETED | OUTPATIENT
Start: 2021-08-12 | End: 2021-08-12

## 2021-08-12 RX ORDER — ONDANSETRON 2 MG/ML
4 INJECTION INTRAMUSCULAR; INTRAVENOUS EVERY 6 HOURS PRN
Status: DISCONTINUED | OUTPATIENT
Start: 2021-08-12 | End: 2021-08-14 | Stop reason: HOSPADM

## 2021-08-12 RX ORDER — SODIUM CHLORIDE, SODIUM LACTATE, POTASSIUM CHLORIDE, CALCIUM CHLORIDE 600; 310; 30; 20 MG/100ML; MG/100ML; MG/100ML; MG/100ML
9 INJECTION, SOLUTION INTRAVENOUS CONTINUOUS
Status: DISCONTINUED | OUTPATIENT
Start: 2021-08-12 | End: 2021-08-14 | Stop reason: HOSPADM

## 2021-08-12 RX ORDER — FAMOTIDINE 20 MG/1
20 TABLET, FILM COATED ORAL ONCE
Status: COMPLETED | OUTPATIENT
Start: 2021-08-12 | End: 2021-08-12

## 2021-08-12 RX ORDER — FAMOTIDINE 20 MG/1
20 TABLET, FILM COATED ORAL EVERY 12 HOURS SCHEDULED
Status: DISCONTINUED | OUTPATIENT
Start: 2021-08-12 | End: 2021-08-14 | Stop reason: HOSPADM

## 2021-08-12 RX ORDER — EPHEDRINE SULFATE 50 MG/ML
INJECTION, SOLUTION INTRAVENOUS AS NEEDED
Status: DISCONTINUED | OUTPATIENT
Start: 2021-08-12 | End: 2021-08-12 | Stop reason: SURG

## 2021-08-12 RX ORDER — MORPHINE SULFATE 2 MG/ML
2 INJECTION, SOLUTION INTRAMUSCULAR; INTRAVENOUS EVERY 4 HOURS PRN
Status: DISCONTINUED | OUTPATIENT
Start: 2021-08-12 | End: 2021-08-14 | Stop reason: HOSPADM

## 2021-08-12 RX ORDER — SODIUM CHLORIDE, SODIUM LACTATE, POTASSIUM CHLORIDE, CALCIUM CHLORIDE 600; 310; 30; 20 MG/100ML; MG/100ML; MG/100ML; MG/100ML
75 INJECTION, SOLUTION INTRAVENOUS CONTINUOUS
Status: DISCONTINUED | OUTPATIENT
Start: 2021-08-12 | End: 2021-08-13

## 2021-08-12 RX ORDER — ATORVASTATIN CALCIUM 10 MG/1
10 TABLET, FILM COATED ORAL DAILY
Status: DISCONTINUED | OUTPATIENT
Start: 2021-08-12 | End: 2021-08-14 | Stop reason: HOSPADM

## 2021-08-12 RX ORDER — HYDROCHLOROTHIAZIDE 12.5 MG/1
12.5 CAPSULE, GELATIN COATED ORAL DAILY
Status: DISCONTINUED | OUTPATIENT
Start: 2021-08-13 | End: 2021-08-14 | Stop reason: HOSPADM

## 2021-08-12 RX ORDER — ONDANSETRON 2 MG/ML
INJECTION INTRAMUSCULAR; INTRAVENOUS AS NEEDED
Status: DISCONTINUED | OUTPATIENT
Start: 2021-08-12 | End: 2021-08-12 | Stop reason: SURG

## 2021-08-12 RX ORDER — DEXAMETHASONE SODIUM PHOSPHATE 4 MG/ML
INJECTION, SOLUTION INTRA-ARTICULAR; INTRALESIONAL; INTRAMUSCULAR; INTRAVENOUS; SOFT TISSUE AS NEEDED
Status: DISCONTINUED | OUTPATIENT
Start: 2021-08-12 | End: 2021-08-12 | Stop reason: SURG

## 2021-08-12 RX ORDER — PROPOFOL 10 MG/ML
VIAL (ML) INTRAVENOUS AS NEEDED
Status: DISCONTINUED | OUTPATIENT
Start: 2021-08-12 | End: 2021-08-12 | Stop reason: SURG

## 2021-08-12 RX ORDER — SODIUM CHLORIDE 0.9 % (FLUSH) 0.9 %
10 SYRINGE (ML) INJECTION AS NEEDED
Status: DISCONTINUED | OUTPATIENT
Start: 2021-08-12 | End: 2021-08-14 | Stop reason: HOSPADM

## 2021-08-12 RX ORDER — HYDROMORPHONE HYDROCHLORIDE 1 MG/ML
0.5 INJECTION, SOLUTION INTRAMUSCULAR; INTRAVENOUS; SUBCUTANEOUS
Status: DISCONTINUED | OUTPATIENT
Start: 2021-08-12 | End: 2021-08-12 | Stop reason: HOSPADM

## 2021-08-12 RX ORDER — OXYCODONE HCL 10 MG/1
10 TABLET, FILM COATED, EXTENDED RELEASE ORAL ONCE
Status: COMPLETED | OUTPATIENT
Start: 2021-08-12 | End: 2021-08-12

## 2021-08-12 RX ORDER — DIPHENHYDRAMINE HCL 25 MG
25 CAPSULE ORAL NIGHTLY PRN
Status: DISCONTINUED | OUTPATIENT
Start: 2021-08-12 | End: 2021-08-14 | Stop reason: HOSPADM

## 2021-08-12 RX ADMIN — CEFAZOLIN SODIUM 2 G: 2 INJECTION, SOLUTION INTRAVENOUS at 17:48

## 2021-08-12 RX ADMIN — DEXAMETHASONE SODIUM PHOSPHATE 8 MG: 4 INJECTION, SOLUTION INTRA-ARTICULAR; INTRALESIONAL; INTRAMUSCULAR; INTRAVENOUS; SOFT TISSUE at 10:30

## 2021-08-12 RX ADMIN — LIDOCAINE HYDROCHLORIDE 50 MG: 10 INJECTION, SOLUTION EPIDURAL; INFILTRATION; INTRACAUDAL; PERINEURAL at 10:28

## 2021-08-12 RX ADMIN — TAMSULOSIN HYDROCHLORIDE 0.4 MG: 0.4 CAPSULE ORAL at 17:48

## 2021-08-12 RX ADMIN — ROCURONIUM BROMIDE 20 MG: 10 INJECTION, SOLUTION INTRAVENOUS at 10:59

## 2021-08-12 RX ADMIN — SODIUM CHLORIDE, POTASSIUM CHLORIDE, SODIUM LACTATE AND CALCIUM CHLORIDE 75 ML/HR: 600; 310; 30; 20 INJECTION, SOLUTION INTRAVENOUS at 16:06

## 2021-08-12 RX ADMIN — FAMOTIDINE 20 MG: 20 TABLET ORAL at 17:47

## 2021-08-12 RX ADMIN — ACETAMINOPHEN 650 MG: 325 TABLET, FILM COATED ORAL at 17:58

## 2021-08-12 RX ADMIN — ROCURONIUM BROMIDE 50 MG: 10 INJECTION, SOLUTION INTRAVENOUS at 10:28

## 2021-08-12 RX ADMIN — ACETAMINOPHEN 1000 MG: 500 TABLET, FILM COATED ORAL at 08:11

## 2021-08-12 RX ADMIN — FAMOTIDINE 20 MG: 20 TABLET ORAL at 20:23

## 2021-08-12 RX ADMIN — FAMOTIDINE 20 MG: 20 TABLET ORAL at 08:11

## 2021-08-12 RX ADMIN — SODIUM CHLORIDE, PRESERVATIVE FREE 3 ML: 5 INJECTION INTRAVENOUS at 20:49

## 2021-08-12 RX ADMIN — FENTANYL CITRATE 50 MCG: 50 INJECTION INTRAMUSCULAR; INTRAVENOUS at 15:05

## 2021-08-12 RX ADMIN — IBUPROFEN 800 MG: 800 TABLET, FILM COATED ORAL at 08:11

## 2021-08-12 RX ADMIN — LIDOCAINE HYDROCHLORIDE 0.5 ML: 10 INJECTION, SOLUTION EPIDURAL; INFILTRATION; INTRACAUDAL; PERINEURAL at 07:58

## 2021-08-12 RX ADMIN — LOSARTAN POTASSIUM 50 MG: 50 TABLET, FILM COATED ORAL at 17:48

## 2021-08-12 RX ADMIN — EPHEDRINE SULFATE 10 MG: 50 INJECTION INTRAVENOUS at 11:01

## 2021-08-12 RX ADMIN — FENTANYL CITRATE 100 MCG: 50 INJECTION, SOLUTION INTRAMUSCULAR; INTRAVENOUS at 10:30

## 2021-08-12 RX ADMIN — CEFAZOLIN SODIUM 2 G: 2 INJECTION, SOLUTION INTRAVENOUS at 10:27

## 2021-08-12 RX ADMIN — ONDANSETRON 4 MG: 2 INJECTION INTRAMUSCULAR; INTRAVENOUS at 13:29

## 2021-08-12 RX ADMIN — ROCURONIUM BROMIDE 10 MG: 10 INJECTION, SOLUTION INTRAVENOUS at 11:47

## 2021-08-12 RX ADMIN — PROPOFOL 200 MG: 10 INJECTION, EMULSION INTRAVENOUS at 10:28

## 2021-08-12 RX ADMIN — OXYCODONE HYDROCHLORIDE 10 MG: 10 TABLET, FILM COATED, EXTENDED RELEASE ORAL at 08:11

## 2021-08-12 RX ADMIN — FENTANYL CITRATE 50 MCG: 50 INJECTION INTRAMUSCULAR; INTRAVENOUS at 14:49

## 2021-08-12 RX ADMIN — SODIUM CHLORIDE, POTASSIUM CHLORIDE, SODIUM LACTATE AND CALCIUM CHLORIDE 9 ML/HR: 600; 310; 30; 20 INJECTION, SOLUTION INTRAVENOUS at 07:58

## 2021-08-12 RX ADMIN — ONDANSETRON 4 MG: 2 INJECTION INTRAMUSCULAR; INTRAVENOUS at 20:49

## 2021-08-12 RX ADMIN — ATORVASTATIN CALCIUM 10 MG: 10 TABLET, FILM COATED ORAL at 17:48

## 2021-08-12 RX ADMIN — HYDROMORPHONE HYDROCHLORIDE 0.5 MG: 1 INJECTION, SOLUTION INTRAMUSCULAR; INTRAVENOUS; SUBCUTANEOUS at 15:10

## 2021-08-12 NOTE — ANESTHESIA PROCEDURE NOTES
Airway  Urgency: elective    Date/Time: 8/12/2021 10:28 AM  Airway not difficult    General Information and Staff    Patient location during procedure: OR  CRNA: Jas Man CRNA    Indications and Patient Condition  Indications for airway management: airway protection    Preoxygenated: yes  MILS not maintained throughout  Mask difficulty assessment: 1 - vent by mask    Final Airway Details  Final airway type: endotracheal airway      Successful airway: ETT  Cuffed: yes   Successful intubation technique: video laryngoscopy  Facilitating devices/methods: intubating stylet  Endotracheal tube insertion site: oral  Blade: Aaron  Blade size: 3  ETT size (mm): 7.5  Cormack-Lehane Classification: grade I - full view of glottis  Placement verified by: chest auscultation and capnometry   Measured from: lips  ETT/EBT  to lips (cm): 20  Number of attempts at approach: 1  Assessment: lips, teeth, and gum same as pre-op and atraumatic intubation    Additional Comments  Negative epigastric sounds, Breath sound equal bilaterally with symmetric chest rise and fall

## 2021-08-12 NOTE — ANESTHESIA POSTPROCEDURE EVALUATION
Patient: Vladimir Haq    Procedure Summary     Date: 08/12/21 Room / Location:  [facility] /  [facility]    Anesthesia Start: 1023 Anesthesia Stop:     Procedure: POSTERIOR CERVICAL FUSION VERTEX SYSTEM, LAMINECTOMIES & FUSION C3-7 (N/A Spine Cervical) Diagnosis:       Cervical spondylosis with myelopathy      (Cervical spondylosis with myelopathy [M47.12])    Surgeons: Osmani Mckeon MD Provider: Toro Snell MD    Anesthesia Type: general ASA Status: 3          Anesthesia Type: general    Vitals  Vitals Value Taken Time   BP     Temp     Pulse     Resp     SpO2 99 % 08/12/21 1424   Vitals shown include unvalidated device data.        Post Anesthesia Care and Evaluation    Patient location during evaluation: PACU  Patient participation: complete - patient participated  Level of consciousness: awake and alert  Pain score: 0  Pain management: adequate  Airway patency: patent  Anesthetic complications: No anesthetic complications  PONV Status: none  Cardiovascular status: hemodynamically stable and acceptable  Respiratory status: nonlabored ventilation, acceptable and nasal cannula  Hydration status: acceptable

## 2021-08-12 NOTE — H&P
Pre-Op H&P  Vladimir Haq  [id number]  1950      Chief complaint: Gait dysfunction       Subjective:  Patient is a 70 y.o.male presents for scheduled surgery by Dr. Mckeon. He anticipates a POSTERIOR CERVICAL FUSION VERTEX SYSTEM, SCHNEIDER & FUSION C3-7 today. He has had previous neck surgeries. He denies radicular pain or numbness. Denies neck pain. His gait problems have worsened over the last 6 months. He denies saddle anesthesia.      Review of Systems:  Constitutional-- No fever, chills or sweats. + fatigue.  CV-- No chest pain, palpitation or syncope. +HTN, HLD  Resp-- No SOB, cough, hemoptysis  Skin--No rashes or lesions      Allergies: No Known Allergies      Home Meds:  Medications Prior to Admission   Medication Sig Dispense Refill Last Dose   • ascorbic acid (VITAMIN C) 500 MG tablet Take 500 mg by mouth Daily.   8/11/2021 at 1200   • aspirin 81 MG chewable tablet Chew 81 mg Daily.   8/12/2021 at 0535   • chlorhexidine (HIBICLENS) 4 % external liquid Apply  topically to the appropriate area as directed Daily. To surgical site each day starting 5 days prior to surgery 120 mL 0 8/11/2021 at 2130   • hydroCHLOROthiazide (MICROZIDE) 12.5 MG capsule Take 12.5 mg by mouth Daily.   8/12/2021 at 0535   • losartan (COZAAR) 50 MG tablet Take 50 mg by mouth Daily.   8/11/2021 at 0830   • lovastatin (ALTOPREV) 40 MG 24 hr tablet Take 40 mg by mouth Every Night.   8/11/2021 at 2230   • mupirocin (Bactroban) 2 % ointment into the nostril(s) as directed by provider 2 (Two) Times a Day. DO NOT BEGIN UNTIL AFTER PRE-ADMISSION TESTING COMPLETE 15 g 0 8/11/2021 at 2100   • tamsulosin (FLOMAX) 0.4 MG capsule 24 hr capsule Take 1 capsule by mouth every night at bedtime.   8/11/2021 at 2230   • metroNIDAZOLE (METROGEL) 1 % gel Apply 1 application topically to the appropriate area as directed Daily As Needed (Rosacea).   8/10/2021   • multivitamin (MULTI VITAMIN PO) Take 1 tablet by mouth Daily.   8/5/2021         PMH:  "  Past Medical History:   Diagnosis Date   • Arthritis    • Cancer (CMS/HCC)     Basal cell skin cancer   • Hyperlipidemia    • Hypertension    • Rosacea    • Wears glasses      PSH:    Past Surgical History:   Procedure Laterality Date   • CERVICAL DISC SURGERY  2002    Dr. Robbins, [facility]    • COLONOSCOPY  2016   • HIP ARTHROPLASTY Right 12/03/2018    Dr. Kimble   • TONSILLECTOMY         Immunization History:  Influenza: 2020  Pneumococcal: UTD  Tetanus: UTD  Covid x2: 2021    Social History:   Tobacco:   Social History     Tobacco Use   Smoking Status Never Smoker   Smokeless Tobacco Never Used      Alcohol:     Social History     Substance and Sexual Activity   Alcohol Use Yes    Comment: 14 drinks per week.         Physical Exam:/75 (BP Location: Right arm, Patient Position: Lying)   Pulse 82   Temp 97.6 °F (36.4 °C) (Temporal)   Resp 16   Ht 177.8 cm (70\")   Wt 77.6 kg (171 lb)   SpO2 97%   BMI 24.54 kg/m²       General Appearance:    Alert, cooperative, no distress, appears stated age   Head:    Normocephalic, without obvious abnormality, atraumatic   Lungs:     Clear to auscultation bilaterally, respirations unlabored    Heart:   Regular rate and rhythm, S1 and S2 normal    Abdomen:    Soft without tenderness   Extremities:   Extremities normal, atraumatic, no cyanosis or edema   Skin:   Skin color, texture, turgor normal, no rashes or lesions   Neurologic:   Grossly intact     Results Review:     LABS:  Lab Results   Component Value Date    WBC 5.53 08/10/2021    HGB 15.5 08/10/2021    HCT 45.5 08/10/2021    MCV 88.9 08/10/2021     08/10/2021    K 3.9 08/10/2021       RADIOLOGY:  6/27/21 MRI thoracic:  IMPRESSION:  No evidence for severe or critical stenosis with only mild  spondylitic changes and stenoses present as detailed above. No focal  subluxation or listhesis with vertebral body heights preserved  demonstrating bridging osteophytes and degenerative changes " throughout.    I reviewed the patient's new clinical results.    Cancer Staging (if applicable)  Cancer Patient: __ yes __no __unknown; If yes, clinical stage T:__ N:__M:__, stage group or __N/A      Impression: Cervical spondylosis with myelopathy       Plan: POSTERIOR CERVICAL FUSION VERTEX SYSTEM, SCHNEIDER & FUSION C3-7      Dorothea Walton, CASSIA   8/12/2021   08:29 EDT

## 2021-08-12 NOTE — OP NOTE
NEUROSURGICAL OPERATIVE NOTE        PREOPERATIVE DIAGNOSIS:    Cervical spondylosis and stenosis with myelopathy      POSTOPERATIVE DIAGNOSIS:  Same      PROCEDURE:  1.  Arthrodesis postero-lateral type C3-C7  2.  C3-C7 decompressive laminectomies  3.  Segmental screw anuja fixation C3-7  4.  Use of [place] and local autograft  5.  Stealth frameless stereotaxy utilized in conjunction with O arm imaging      SURGEON:  Osmani Mckeon M.D.      ASSISTANT: Massiel Fowler PA-C    PAC assisted with:   Suctioning   Retraction   Tying   Suturing   Closing   Application of dressing   Skilled neurosurgery PA assistance was necessary to perform this procedure.        ANESTHESIA:  General      ESTIMATED BLOOD LOSS: Minimal      SPECIMEN: None      DRAINS: 7 flat ZITA      COMPLICATIONS:  None      CLINICAL NOTE:  The patient is a 70-year-old gentleman with progressive gait dysfunction.  He has previously undergone anterior cervical surgery by one of my former colleagues.  Studies now demonstrate severe diffuse cervical stenosis with cord compression.  As such he presents at this time for multilevel dorsal decompression with fusion and stabilization.  The nature of the procedure as well as the potential risks, complications, limitations, and alternatives to the procedure were discussed at length with the patient and the patient has agreed to proceed with surgery.      TECHNICAL NOTE:  The patient was brought to the operating room, and while on his cart general endotracheal anesthesia was achieved.  A head kerr was affixed to his head.  He was then logrolled onto the German table.  Special care was ensured to protect pressure points.  His arms were padded and tucked to his side.  The head kerr was affixed to the operating room table to maintain his head in a neutral position.  The dorsal neck was prepared and draped in the usual fashion.  O-arm imaging ensued.  These images were downloaded into the [employer]. Based on  this, a midline incision was fashioned, exposing C3 to C7.  The underlying tissues were divided with cautery to provide exposure to the posterior spinal elements.  After exposure to the lateral margins of the lateral masses Stealth frameless stereotaxy was utilized to chon each of the lateral mass screw entry sites bilaterally at C3 through C6.  I also marked the pedicle entry sites at C7.  The drill was utilized followed by a tap at each level, and 3.5 mm x 14 mm length screws were placed bilaterally at C3, C4, C5, and C6.  Similarly, 3.5 mm diameter screws were placed in the pedicles at C7 using the Stealth.  The screw on the left was 24 mm, and the screw on the right was 20 mm.  [place] rongeur was then utilized to remove the spinous processes at C3, C4, C5, C6, and the upper half of C7.  The drill was utilized to thin out bone so that it was wafer thin. A 2 mm Kerrison was utilized then to fashion the laminectomy and remove the thinned bone and ligament.  The facet complexes were medially resected.  Bleeding points were controlled with bone wax and Floseal.  As expected, the spinal canal was terribly tight.  Good decompression was achieved.  Precut rods measuring 70 mm were then contoured and seated within the screw heads on each side.  Set screws were applied and tightened per routine.  The wound was washed out with a saline solution.  A combination of [place] and local autograft, which had been harvested with the drill and a Lukens trap, was used as an onlay fusion over the lateral masses from C3 to C7.  A 7 flat ZITA drain was brought in through a separate stab incision and left in the epidural space.  Vancomycin powder was sprinkled in the depths and then more superficially as the wound was closed.  The paraspinous muscle and fascia were re-approximated in an interrupted fashion with 0-Vicryl suture.  Exparel was instilled into the paraspinous musculature and subcutaneous tissues.  The subcutaneous tissues  were closed in layers with 2-0 followed by 3-0 Vicryl suture. The skin was closed in a running locking fashion with 3-0 nylon suture.  Postop O-arm spin demonstrated excellent positioning of the construct from C3 to C7.  The patient did receive preoperative antibiotics.  He was subsequently rolled onto his cart, extubated, and taken to the recovery room in satisfactory condition.              Osmani Mckeon M.D.

## 2021-08-13 PROCEDURE — 97161 PT EVAL LOW COMPLEX 20 MIN: CPT

## 2021-08-13 PROCEDURE — 63710000001 DIPHENHYDRAMINE PER 50 MG: Performed by: NEUROLOGICAL SURGERY

## 2021-08-13 PROCEDURE — 97116 GAIT TRAINING THERAPY: CPT

## 2021-08-13 PROCEDURE — 25010000003 CEFAZOLIN IN DEXTROSE 2-4 GM/100ML-% SOLUTION: Performed by: NEUROLOGICAL SURGERY

## 2021-08-13 PROCEDURE — 99024 POSTOP FOLLOW-UP VISIT: CPT | Performed by: NEUROLOGICAL SURGERY

## 2021-08-13 RX ORDER — TRAMADOL HYDROCHLORIDE 50 MG/1
50 TABLET ORAL EVERY 6 HOURS PRN
Status: DISCONTINUED | OUTPATIENT
Start: 2021-08-13 | End: 2021-08-14 | Stop reason: HOSPADM

## 2021-08-13 RX ADMIN — TRAMADOL HYDROCHLORIDE 50 MG: 50 TABLET, FILM COATED ORAL at 12:05

## 2021-08-13 RX ADMIN — CEFAZOLIN SODIUM 2 G: 2 INJECTION, SOLUTION INTRAVENOUS at 03:45

## 2021-08-13 RX ADMIN — TAMSULOSIN HYDROCHLORIDE 0.4 MG: 0.4 CAPSULE ORAL at 09:32

## 2021-08-13 RX ADMIN — HYDROCHLOROTHIAZIDE 12.5 MG: 12.5 CAPSULE ORAL at 09:32

## 2021-08-13 RX ADMIN — SODIUM CHLORIDE, PRESERVATIVE FREE 3 ML: 5 INJECTION INTRAVENOUS at 09:00

## 2021-08-13 RX ADMIN — FAMOTIDINE 20 MG: 20 TABLET ORAL at 09:32

## 2021-08-13 RX ADMIN — LOSARTAN POTASSIUM 50 MG: 50 TABLET, FILM COATED ORAL at 09:32

## 2021-08-13 RX ADMIN — TRAMADOL HYDROCHLORIDE 50 MG: 50 TABLET, FILM COATED ORAL at 19:14

## 2021-08-13 RX ADMIN — ASPIRIN 81 MG: 81 TABLET, CHEWABLE ORAL at 09:32

## 2021-08-13 RX ADMIN — DIPHENHYDRAMINE HYDROCHLORIDE 25 MG: 25 CAPSULE ORAL at 20:45

## 2021-08-13 RX ADMIN — ACETAMINOPHEN 650 MG: 325 TABLET, FILM COATED ORAL at 20:45

## 2021-08-13 RX ADMIN — ACETAMINOPHEN 650 MG: 325 TABLET, FILM COATED ORAL at 09:40

## 2021-08-13 RX ADMIN — FAMOTIDINE 20 MG: 20 TABLET ORAL at 20:45

## 2021-08-13 RX ADMIN — ATORVASTATIN CALCIUM 10 MG: 10 TABLET, FILM COATED ORAL at 09:32

## 2021-08-13 NOTE — PROGRESS NOTES
"NEUROSURGERY PROGRESS NOTE     LOS: 1 day   Patient Care Team:  Jim Lopez MD as PCP - General  Jim Lopez MD as PCP - [facility]  BlairYonny bocanegra MD as Referring Physician (Neurology)    Chief Complaint: Gait difficulty.    POD#: 1 Day Post-Op  Procedures:  C3-7 dorsal decompression with fusion and stabilization.    Interval History:   The patient is ambulatory and voiding.  Patient Complaints: Incisional pain.  Patient Denies: New weakness or numbness.    Vital Signs: Blood pressure 132/78, pulse 93, temperature 98.6 °F (37 °C), temperature source Oral, resp. rate 16, height 177.8 cm (70\"), weight 77.6 kg (171 lb), SpO2 96 %.  Intake/Output:     Intake/Output Summary (Last 24 hours) at 8/13/2021 0704  Last data filed at 8/13/2021 0356  Gross per 24 hour   Intake 1740 ml   Output 1825 ml   Net -85 ml     Drain output: 160/175 mL.    Physical Exam:  The patient is awake and alert.  He is sitting upright in bed.  Dry dressing is in place on his incision.  Motor function and tone are normal throughout.      Assessment/Plan:  1.  Cervical spondylosis and stenosis with myelopathy status post C3-7 dorsal decompression with fusion and stabilization.  2.  History of hypertension.  3.  Disposition: Mobilize patient.  I anticipate that the patient will be discharged home in a day or two once his pain is controlled and his drain output diminishes.    Osmani Mckeon MD  08/13/21  07:04 EDT    "

## 2021-08-13 NOTE — PLAN OF CARE
Goal Outcome Evaluation:  Plan of Care Reviewed With: patient        Progress: improving  Outcome Summary: PT eval completed. Patient alert and oriented x 4. Presents post op C3-C7 decompression laminectomies with fusion. demonstrates deficits in gait quality and dynamic balance effecting functional mobility below baseline. Requiring Ashley for bed mobility, CGA for transfers with FWW, and CGA for ambulation x 350' with FWW. Ataxic gait pattern noted with gait RLE > LLE. No losses of balance. Patient will benefit from skilled IP PT services to address impairments in order to return to PLOF. Recommend home with assist and OP PT at [place].

## 2021-08-13 NOTE — PLAN OF CARE
Goal Outcome Evaluation:  Plan of Care Reviewed With: patient        Progress: improving  Outcome Summary: Pt doing well today.  AAOx4.  Has had continued complaints of pain which are relieved more with positioning vs pain medications.  Transfers well with 1 assist.  VS Stable.  ZITA drain draining serosanguious drainage.  130 mL emptied on this shift.  No other complaints other than pain.  Will continue to monitor and notify MD if issues arise.

## 2021-08-13 NOTE — CASE MANAGEMENT/SOCIAL WORK
Discharge Planning Assessment  [facility]     Patient Name: Vladimir Haq  MRN: [id number]  Today's Date: 8/13/2021    Admit Date: 8/12/2021    Discharge Needs Assessment     Row Name 08/13/21 1234       Living Environment    Lives With  alone    Current Living Arrangements  home/apartment/condo    Family Caregiver if Needed  significant other;other relative(s)    Quality of Family Relationships  unable to assess    Able to Return to Prior Arrangements  yes       Resource/Environmental Concerns    Resource/Environmental Concerns  none    Transportation Concerns  car, none       Transition Planning    Patient/Family Anticipates Transition to  home with family    Patient/Family Anticipated Services at Transition  none    Transportation Anticipated  family or friend will provide       Discharge Needs Assessment    Readmission Within the Last 30 Days  no previous admission in last 30 days    Equipment Currently Used at Home  walker, rolling Access to a rolling walker.    Equipment Needed After Discharge  none        Discharge Plan     Row Name 08/13/21 2571       Plan    Plan  Home with Significant other's assistance    Patient/Family in Agreement with Plan  yes    Plan Comments  Met with Mr. Haq at the bedside for discharge planning.  Mr. Haq lives alone in a one story home in [place].  He states that he has been evaluated by PT and he is ambulating with a rolling walker.  He denies any DME or home health needs.   Mr. Chacon states that his significant other, Raulito, will be staying with him in his home to assist.  He also has a cousin, Liza, that can assist him.  No needs identified.    Raulito will be transporting Mr. Haq home when discharged.    CM will continue to follow.    Final Discharge Disposition Code  01 - home or self-care        Continued Care and Services - Admitted Since 8/12/2021    Coordination has not been started for this encounter.       Expected Discharge Date and Time      Expected Discharge Date Expected Discharge Time    Aug 14, 2021         Demographic Summary     Row Name 08/13/21 1231       General Information    Admission Type  inpatient    Arrived From  home    Reason for Consult  discharge planning    General Information Comments  PCP:  Jim Lopez       Contact Information    Permission Granted to Share Info With      Contact Information Comments  Cousin:  Liza Courtney,  [phone]        Functional Status     Row Name 08/13/21 1233       Functional Status    Usual Activity Tolerance  moderate    Current Activity Tolerance  -- Pending PT evaluation       Functional Status, IADL    Medications  independent    Meal Preparation  independent    Housekeeping  independent    Laundry  independent    Shopping  independent       Mental Status    General Appearance WDL  WDL        Psychosocial    No documentation.       Abuse/Neglect    No documentation.       Legal    No documentation.       Substance Abuse    No documentation.       Patient Forms    No documentation.           Lynda Castanon RN

## 2021-08-13 NOTE — THERAPY EVALUATION
Patient Name: Vladimir Haq  : 1950    MRN: [id number]                              Today's Date: 2021       Admit Date: 2021    Visit Dx:     ICD-10-CM ICD-9-CM   1. Cervical spondylosis with myelopathy  M47.12 721.1     Patient Active Problem List   Diagnosis   • Multilevel degenerative disc disease   • Cervical spondylosis with myelopathy   • Neurologic gait dysfunction   • Cervical spinal stenosis     Past Medical History:   Diagnosis Date   • Arthritis    • Cancer (CMS/HCC)     Basal cell skin cancer   • Hyperlipidemia    • Hypertension    • Rosacea    • Wears glasses      Past Surgical History:   Procedure Laterality Date   • CERVICAL DISC SURGERY      Dr. Robbins, [facility]    • COLONOSCOPY  2016   • HIP ARTHROPLASTY Right 2018    Dr. Kimble   • TONSILLECTOMY       General Information     Row Name 21 1415          Physical Therapy Time and Intention    Document Type  evaluation  -LO     Mode of Treatment  individual therapy;physical therapy  -LO     Row Name 21 1415          General Information    Patient Profile Reviewed  yes  -LO     Prior Level of Function  independent:;all household mobility  -LO     Existing Precautions/Restrictions  spinal;fall  -LO     Row Name 21 1415          Living Environment    Lives With  alone  -LO     Row Name 21 1415          Home Main Entrance    Number of Stairs, Main Entrance  two  -LO     Stair Railings, Main Entrance  railings safe and in good condition  -LO     Row Name 21 1415          Stairs Within Home, Primary    Number of Stairs, Within Home, Primary  none  -LO     Row Name 21 1415          Cognition    Orientation Status (Cognition)  oriented x 4  -LO     Row Name 21 1415          Safety Issues, Functional Mobility    Safety Issues Affecting Function (Mobility)  sequencing abilities  -LO     Impairments Affecting Function (Mobility)  balance;coordination;pain  -LO     Comment,  Safety Issues/Impairments (Mobility)  unsteady gait pattern R>L  -LO       User Key  (r) = Recorded By, (t) = Taken By, (c) = Cosigned By    Initials Name Provider Type    Amanda Coyle PT Physical Therapist        Mobility     Row Name 08/13/21 1418          Bed Mobility    Bed Mobility  supine-sit  -LO     Supine-Sit [place] (Bed Mobility)  modified independence  -LO     Assistive Device (Bed Mobility)  head of bed elevated;bed rails  -LO     Comment (Bed Mobility)  vc for sequencing, no physical assist required  -     Row Name 08/13/21 1418          Transfers    Comment (Transfers)  CGA with FWW with vc for sequencing, no physical assist required  -     Row Name 08/13/21 1418          Sit-Stand Transfer    Sit-Stand [place] (Transfers)  contact guard;verbal cues  -     Assistive Device (Sit-Stand Transfers)  walker, front-wheeled  -LO     Row Name 08/13/21 1418          Gait/Stairs (Locomotion)    [place] Level (Gait)  contact guard  -     Assistive Device (Gait)  walker, front-wheeled  -LO     Distance in Feet (Gait)  350'  -LO     Deviations/Abnormal Patterns (Gait)  right sided deviations;ataxic;steppage;maggie decreased  -LO     Right Sided Gait Deviations  -- ataxic pattern  -LO     [place] Level (Stairs)  not tested  -LO     Comment (Gait/Stairs)  Patient ambulates x 350' with FWW CGA with notable ataxic pattern at RLE with inconsistent step pattern. No loss of balance noted.  -     Row Name 08/13/21 1418          Mobility    Extremity Weight-bearing Status  -- no restrictions noted  -       User Key  (r) = Recorded By, (t) = Taken By, (c) = Cosigned By    Initials Name Provider Type    Amanda Coyle PT Physical Therapist        Obj/Interventions     Row Name 08/13/21 1421          Range of Motion Comprehensive    General Range of Motion  no range of motion deficits identified  -     Row Name 08/13/21 1421          Strength Comprehensive (MMT)    General Manual  Muscle Testing (MMT) Assessment  no strength deficits identified  -[facility] Name 08/13/21 1421          Motor Skills    Motor Skills  coordination  -     Coordination  gross motor deficit;right  -[facility] Name 08/13/21 1421          Balance    Balance Assessment  sitting static balance;standing static balance;sitting dynamic balance;standing dynamic balance  -     Static Sitting Balance  WNL;supported;sitting, edge of bed  -LO     Dynamic Sitting Balance  WNL;supported;sitting, edge of bed  -LO     Static Standing Balance  WNL;supported;standing  -LO     Dynamic Standing Balance  mild impairment;supported;standing  -LO     Comment, Balance  Requires CGA and FWW for safety in standing  -LO     Row Name 08/13/21 1421          Sensory Assessment (Somatosensory)    Sensory Assessment (Somatosensory)  sensation intact  -       User Key  (r) = Recorded By, (t) = Taken By, (c) = Cosigned By    Initials Name Provider Type    Amanda Coyle, PT Physical Therapist        Goals/Plan     Row Name 08/13/21 1429          Bed Mobility Goal 1 (PT)    Activity/Assistive Device (Bed Mobility Goal 1, PT)  rolling to left;rolling to right;sit to supine;supine to sit  -LO     [place] Level/Cues Needed (Bed Mobility Goal 1, PT)  independent  -LO     Time Frame (Bed Mobility Goal 1, PT)  long term goal (LTG);10 days  -[facility] Name 08/13/21 1429          Transfer Goal 1 (PT)    Activity/Assistive Device (Transfer Goal 1, PT)  sit-to-stand/stand-to-sit;bed-to-chair/chair-to-bed;car transfer  -LO     [place] Level/Cues Needed (Transfer Goal 1, PT)  independent  -LO     Time Frame (Transfer Goal 1, PT)  long term goal (LTG);10 days  -LO     Row Name 08/13/21 1429          Gait Training Goal 1 (PT)    Activity/Assistive Device (Gait Training Goal 1, PT)  gait (walking locomotion);assistive device use;improve balance and speed;increase endurance/gait distance;diminish gait deviation;decrease fall risk;decrease  asymmetrical patterns  -LO     [place] Level (Gait Training Goal 1, PT)  independent  -LO     Distance (Gait Training Goal 1, PT)  350'  -LO     Time Frame (Gait Training Goal 1, PT)  long term goal (LTG);10 days  -LO     Row Name 08/13/21 1429          Stairs Goal 1 (PT)    Activity/Assistive Device (Stairs Goal 1, PT)  ascending stairs;descending stairs  -LO     [place] Level/Cues Needed (Stairs Goal 1, PT)  independent  -LO     Number of Stairs (Stairs Goal 1, PT)  2  -LO     Time Frame (Stairs Goal 1, PT)  long term goal (LTG);10 days  -LO     Row Name 08/13/21 1429          Patient Education Goal (PT)    Activity (Patient Education Goal, PT)  Patient will demonstrate independence with all HEP  -LO     Time Frame (Patient Education Goal, PT)  long term goal (LTG);10 days  -LO       User Key  (r) = Recorded By, (t) = Taken By, (c) = Cosigned By    Initials Name Provider Type    Amanda Coyle, PT Physical Therapist        Clinical Impression     Row Name 08/13/21 1423          Pain    Additional Documentation  Pain Scale: Numbers Pre/Post-Treatment (Group)  -LO     Row Name 08/13/21 1423          Pain Scale: Numbers Pre/Post-Treatment    Pretreatment Pain Rating  7/10  -LO     Posttreatment Pain Rating  5/10  -LO     Pain Location - Side  Bilateral  -LO     Pain Location - Orientation  incisional  -LO     Pain Location  neck  -LO     Pre/Posttreatment Pain Comment  Incisional pain decreases with repositioning and ice katy  -LO     Pain Intervention(s)  Cold applied;Cold pack;Repositioned  -LO     Row Name 08/13/21 1420          Plan of Care Review    Plan of Care Reviewed With  patient  -LO     Progress  improving  -LO     Outcome Summary  PT eval completed. Patient alert and oriented x 4. Presents post op C3-C7 decompression laminectomies with fusion. demonstrates deficits in gait quality and dynamic balance effecting functional mobility below baseline. Requiring Ashley for bed mobility, CGA for  transfers with FWW, and CGA for ambulation x 350' with FWW. Ataxic gait pattern noted with gait RLE > LLE. No losses of balance. Patient will benefit from skilled IP PT services to address impairments in order to return to PLOF. Recommend home with assist and OP PT at [place].  -LO     Row Name 08/13/21 1423          Therapy Assessment/Plan (PT)    Patient/Family Therapy Goals Statement (PT)  return to PLOF  -LO     Rehab Potential (PT)  good, to achieve stated therapy goals  -LO     Criteria for Skilled Interventions Met (PT)  yes;skilled treatment is necessary  -LO     Row Name 08/13/21 1423          Vital Signs    Pre Systolic BP Rehab  117  -LO     Pre Treatment Diastolic BP  72  -LO     Pretreatment Heart Rate (beats/min)  83  -LO     Pre SpO2 (%)  96  -LO     O2 Delivery Pre Treatment  room air  -LO     O2 Delivery Intra Treatment  room air  -LO     O2 Delivery Post Treatment  room air  -LO     Pre Patient Position  Sitting  -LO     Intra Patient Position  Standing  -LO     Post Patient Position  Sitting  -LO     Row Name 08/13/21 1423          Positioning and Restraints    Pre-Treatment Position  in bed  -LO     Post Treatment Position  chair  -LO     In Chair  notified nsg;reclined;call light within reach;encouraged to call for assist;exit alarm on;with family/caregiver;waffle cushion  -LO       User Key  (r) = Recorded By, (t) = Taken By, (c) = Cosigned By    Initials Name Provider Type    Amanda Coyle, PT Physical Therapist        Outcome Measures     Row Name 08/13/21 1431          How much help from another person do you currently need...    Turning from your back to your side while in flat bed without using bedrails?  3  -LO     Moving from lying on back to sitting on the side of a flat bed without bedrails?  3  -LO     Moving to and from a bed to a chair (including a wheelchair)?  3  -LO     Standing up from a chair using your arms (e.g., wheelchair, bedside chair)?  3  -LO     Climbing 3-5 steps with a  railing?  3  -LO     To walk in hospital room?  3  -LO     AM-PAC 6 Clicks Score (PT)  18  -LO     Row Name 08/13/21 1431          Functional Assessment    Outcome Measure Options  AM-PAC 6 Clicks Basic Mobility (PT)  -       User Key  (r) = Recorded By, (t) = Taken By, (c) = Cosigned By    Initials Name Provider Type    Amanda Coyle, PT Physical Therapist                       Physical Therapy Education                 Title: PT OT SLP Therapies (Done)     Topic: Physical Therapy (Done)     Point: Mobility training (Done)     Learning Progress Summary           Patient Acceptance, E, VU,NR by  at 8/13/2021 1337    Comment: patient education regarding sequencing for bed mobility and transfers.                   Point: Home exercise program (Done)     Learning Progress Summary           Patient Acceptance, E, VU,NR by  at 8/13/2021 1337    Comment: patient education regarding sequencing for bed mobility and transfers.                   Point: Body mechanics (Done)     Learning Progress Summary           Patient Acceptance, E, VU,NR by  at 8/13/2021 1337    Comment: patient education regarding sequencing for bed mobility and transfers.                   Point: Precautions (Done)     Learning Progress Summary           Patient Acceptance, E, VU,NR by  at 8/13/2021 1337    Comment: patient education regarding sequencing for bed mobility and transfers.                               User Key     Initials Effective Dates Name Provider Type Discipline     06/16/21 -  Amanda Hancock, BOUBACAR Physical Therapist PT              PT Recommendation and Plan  Planned Therapy Interventions (PT): balance training, bed mobility training, gait training, home exercise program, patient/family education, neuromuscular re-education, motor coordination training, strengthening, stair training, transfer training  Plan of Care Reviewed With: patient  Progress: improving  Outcome Summary: PT eval completed. Patient alert and oriented x  4. Presents post op C3-C7 decompression laminectomies with fusion. demonstrates deficits in gait quality and dynamic balance effecting functional mobility below baseline. Requiring Ashley for bed mobility, CGA for transfers with FWW, and CGA for ambulation x 350' with FWW. Ataxic gait pattern noted with gait RLE > LLE. No losses of balance. Patient will benefit from skilled IP PT services to address impairments in order to return to OF. Recommend home with assist and OP PT at [place].     Time Calculation:   PT Charges     Row Name 08/13/21 1337             Time Calculation    Start Time  1337  -LO      PT Received On  08/13/21  -LO      PT Goal Re-Cert Due Date  08/23/21  -LO         Timed Charges    14390 - Gait Training Minutes   16  -LO      92697 - PT Therapeutic Activity Minutes  5  -LO         Untimed Charges    PT Eval/Re-eval Minutes  36  -LO         Total Minutes    Timed Charges Total Minutes  21  -LO      Untimed Charges Total Minutes  36  -LO       Total Minutes  57  -LO        User Key  (r) = Recorded By, (t) = Taken By, (c) = Cosigned By    Initials Name Provider Type    LO Amanda Hancock, PT Physical Therapist        Therapy Charges for Today     Code Description Service Date Service Provider Modifiers Qty    58370703210 HC GAIT TRAINING EA 15 MIN 8/13/2021 Amanda Hancock, PT GP 1    [id number] HC PT EVAL LOW COMPLEXITY 3 8/13/2021 Amanda Hancock, PT GP 1          PT G-Codes  Outcome Measure Options: AM-PAC 6 Clicks Basic Mobility (PT)  AM-PAC 6 Clicks Score (PT): 18    Amanda Hancock PT  8/13/2021

## 2021-08-14 VITALS
DIASTOLIC BLOOD PRESSURE: 78 MMHG | BODY MASS INDEX: 24.48 KG/M2 | SYSTOLIC BLOOD PRESSURE: 129 MMHG | TEMPERATURE: 97.6 F | HEIGHT: 70 IN | OXYGEN SATURATION: 96 % | RESPIRATION RATE: 16 BRPM | HEART RATE: 84 BPM | WEIGHT: 171 LBS

## 2021-08-14 PROCEDURE — 97530 THERAPEUTIC ACTIVITIES: CPT

## 2021-08-14 PROCEDURE — 97110 THERAPEUTIC EXERCISES: CPT

## 2021-08-14 PROCEDURE — 99024 POSTOP FOLLOW-UP VISIT: CPT | Performed by: NEUROLOGICAL SURGERY

## 2021-08-14 PROCEDURE — 97116 GAIT TRAINING THERAPY: CPT

## 2021-08-14 PROCEDURE — 99024 POSTOP FOLLOW-UP VISIT: CPT | Performed by: PHYSICIAN ASSISTANT

## 2021-08-14 RX ORDER — TRAMADOL HYDROCHLORIDE 50 MG/1
50 TABLET ORAL EVERY 6 HOURS PRN
Qty: 25 TABLET | Refills: 1 | Status: SHIPPED | OUTPATIENT
Start: 2021-08-14 | End: 2022-10-30

## 2021-08-14 RX ADMIN — ATORVASTATIN CALCIUM 10 MG: 10 TABLET, FILM COATED ORAL at 08:35

## 2021-08-14 RX ADMIN — TAMSULOSIN HYDROCHLORIDE 0.4 MG: 0.4 CAPSULE ORAL at 08:35

## 2021-08-14 RX ADMIN — FAMOTIDINE 20 MG: 20 TABLET ORAL at 08:35

## 2021-08-14 RX ADMIN — SODIUM CHLORIDE, PRESERVATIVE FREE 3 ML: 5 INJECTION INTRAVENOUS at 09:00

## 2021-08-14 RX ADMIN — ASPIRIN 81 MG: 81 TABLET, CHEWABLE ORAL at 08:35

## 2021-08-14 RX ADMIN — LOSARTAN POTASSIUM 50 MG: 50 TABLET, FILM COATED ORAL at 08:35

## 2021-08-14 RX ADMIN — HYDROCHLOROTHIAZIDE 12.5 MG: 12.5 CAPSULE ORAL at 08:35

## 2021-08-14 NOTE — THERAPY DISCHARGE NOTE
Patient Name: Vladimir Haq  : 1950    MRN: [id number]                              Today's Date: 2021       Admit Date: 2021    Visit Dx:     ICD-10-CM ICD-9-CM   1. Cervical spondylosis with myelopathy  M47.12 721.1     Patient Active Problem List   Diagnosis   • Multilevel degenerative disc disease   • Cervical spondylosis with myelopathy   • Neurologic gait dysfunction   • Cervical spinal stenosis     Past Medical History:   Diagnosis Date   • Arthritis    • Cancer (CMS/HCC)     Basal cell skin cancer   • Hyperlipidemia    • Hypertension    • Rosacea    • Wears glasses      Past Surgical History:   Procedure Laterality Date   • CERVICAL DISC SURGERY      Dr. Robbins, [facility]    • COLONOSCOPY  2016   • HIP ARTHROPLASTY Right 2018    Dr. Kimble   • TONSILLECTOMY       General Information     Row Name 21 1305          Physical Therapy Time and Intention    Document Type  discharge treatment  -DM     Mode of Treatment  physical therapy  -DM     Row Name 21 1308          General Information    Existing Precautions/Restrictions  fall;spinal;other (see comments) compress. injury to S.C.; DDD;: lami C 3-7 & post.cerv fus.; nick drain; H/O ACDF , w/ prog L arm/leg weakness/ataxia since  -DM       User Key  (r) = Recorded By, (t) = Taken By, (c) = Cosigned By    Initials Name Provider Type    DM Angélica Cosby, PT Physical Therapist        Mobility     Row Name 21 6697          Bed Mobility    Comment (Bed Mobility)  UIC; nsg will d/c NICK at 3 pm,then d/c home;no order for f/u PT from neurosx,but pt asking PT to check w/ CM & Nsg;consult w/ nsg,then pt deciding he'll wait till seen in neurosx.office in 10-12 days for suture removal, & inquire then  -DM     Row Name 21 1306          Transfers    Comment (Transfers)  Cues for HP, Seq  -DM     Row Name 21 130          Sit-Stand Transfer    Sit-Stand [place] (Transfers)  verbal  cues;standby assist  -DM     Assistive Device (Sit-Stand Transfers)  walker, front-wheeled does not use AD for transit mvmt STS (chair armrests instead)  -DM     Row Name 08/14/21 1305          Gait/Stairs (Locomotion)    [place] Level (Gait)  verbal cues;contact guard 2 Stand.rests; also MIP X 10, & to stairs  -DM     Assistive Device (Gait)  walker, front-wheeled  -DM     Distance in Feet (Gait)  550 (already amb 700 ft this AM)  -DM     Deviations/Abnormal Patterns (Gait)  ataxic;base of support, narrow;maggie decreased;steppage;left sided deviations;stride length decreased;weight shifting decreased  -DM     Left Sided Gait Deviations  forward flexed posture;heel strike decreased  -DM     [place] Level (Stairs)  verbal cues;contact guard  -DM     Assistive Device (Stairs)  other (see comments) decl AD  -DM     Handrail Location (Stairs)  right side (ascending);left side (descending)  -DM     Number of Steps (Stairs)  4  -DM     Ascending Technique (Stairs)  step-to-step  -DM     Descending Technique (Stairs)  step-to-step  -DM     Stairs, Safety Issues  balance decreased during turns;sequencing ability decreased  -DM     Stairs, Impairments  strength decreased;coordination impaired;pain  -DM     Comment (Gait/Stairs)  cues to incr LLE step length, swing through & h.strike, ext neck/trunk  & focus ahead, PLB  -DM       User Key  (r) = Recorded By, (t) = Taken By, (c) = Cosigned By    Initials Name Provider Type    DM Angélica Cosby, PT Physical Therapist        Obj/Interventions     Row Name 08/14/21 1306          Motor Skills    Therapeutic Exercise  shoulder;knee;hip;ankle issued cerv post op HEP,then pt req addnl exer (standing stat.& dyn bal incl MIP,MINI squats & SLS stance w/ squat, toe & heel risers, HS curls, correction of forw.head posture)  -DM     Row Name 08/14/21 1307          Shoulder (Therapeutic Exercise)    Shoulder (Therapeutic Exercise)  AROM (active range of motion)  -      Shoulder AROM (Therapeutic Exercise)  bilateral;flexion;extension;aBduction;aDduction;external rotation;internal rotation;scapular elevation;scapular retraction;sitting;standing;10 repetitions;other (see comments) issued Cerv. post-op HEP & instructed (+ post op precaut.);did modif sh/elbow F/E w/ reaching across midline to level of crown,  exer, biceps curls, chin tucks,sh.circles & shrugs (pt req. strengthening exer for UE's)  -DM     Row Name 08/14/21 1305          Hip (Therapeutic Exercise)    Hip (Therapeutic Exercise)  AROM (active range of motion);isometric exercises  -     Hip AROM (Therapeutic Exercise)  bilateral;flexion;extension;aBduction;aDduction;external rotation;internal rotation;sitting;standing;10 repetitions & MIP; Added hip rot per pt req. (R THR'18  was ant.approach); standing hip abd,flex,ext, HS curls  -DM     Hip Isometrics (Therapeutic Exercise)  bilateral;gluteal sets;10 repetitions;sitting  -     Row Name 08/14/21 1306          Knee (Therapeutic Exercise)    Knee (Therapeutic Exercise)  AROM (active range of motion);isometric exercises  -     Knee AROM (Therapeutic Exercise)  bilateral;flexion;extension;SAQ (short arc quad);LAQ (long arc quad);heel slides;sitting;standing;10 repetitions;hamstring curls  -     Knee Isometrics (Therapeutic Exercise)  bilateral;hamstring sets;quad sets;sitting;10 repetitions  -DM     Row Name 08/14/21 1303          Ankle (Therapeutic Exercise)    Ankle (Therapeutic Exercise)  AROM (active range of motion)  -DM     Ankle AROM (Therapeutic Exercise)  bilateral;dorsiflexion;plantarflexion;sitting;10 repetitions;other (see comments) AC  -DM     Row Name 08/14/21 130          Balance    Balance Assessment  sitting static balance;sitting dynamic balance;standing dynamic balance;standing static balance  -DM     Static Sitting Balance  WNL;unsupported;sitting in chair  -DM     Dynamic Sitting Balance  WNL;unsupported;sitting in chair  -DM     Static  Standing Balance  WNL;supported;standing  -DM     Dynamic Standing Balance  mild impairment;supported;standing dyn bal. exer, WS to init sidesteps/backing to chair, & stairs  -DM     Balance Interventions  sitting;standing;static;dynamic;weight shifting activity  -DM       User Key  (r) = Recorded By, (t) = Taken By, (c) = Cosigned By    Initials Name Provider Type    DM Angélica Cosby, PT Physical Therapist        Goals/Plan     Row Name 08/14/21 1306          Bed Mobility Goal 1 (PT)    Activity/Assistive Device (Bed Mobility Goal 1, PT)  rolling to right;rolling to left;sit to supine;supine to sit  -DM     [place] Level/Cues Needed (Bed Mobility Goal 1, PT)  independent  -DM     Time Frame (Bed Mobility Goal 1, PT)  long term goal (LTG);10 days  -DM     Progress/Outcomes (Bed Mobility Goal 1, PT)  goal partially met  -DM     Row Name 08/14/21 7818          Transfer Goal 1 (PT)    Activity/Assistive Device (Transfer Goal 1, PT)  sit-to-stand/stand-to-sit;bed-to-chair/chair-to-bed car transf deferred (S.O.w/ car not present)  -DM     [place] Level/Cues Needed (Transfer Goal 1, PT)  independent  -DM     Time Frame (Transfer Goal 1, PT)  long term goal (LTG);10 days  -DM     Progress/Outcome (Transfer Goal 1, PT)  goal partially met  -DM     Row Name 08/14/21 1954          Gait Training Goal 1 (PT)    Activity/Assistive Device (Gait Training Goal 1, PT)  gait (walking locomotion);assistive device use;increase endurance/gait distance;improve balance and speed;diminish gait deviation;decrease fall risk;decrease asymmetrical patterns;walker, rolling  -DM     [place] Level (Gait Training Goal 1, PT)  independent  -DM     Distance (Gait Training Goal 1, PT)  350  -DM     Time Frame (Gait Training Goal 1, PT)  long term goal (LTG);10 days  -DM     Progress/Outcome (Gait Training Goal 1, PT)  goal partially met  -DM     Row Name 08/14/21 0502          Stairs Goal 1 (PT)    Activity/Assistive Device (Stairs  Goal 1, PT)  ascending stairs;descending stairs  -DM     [place] Level/Cues Needed (Stairs Goal 1, PT)  independent  -DM     Number of Stairs (Stairs Goal 1, PT)  2  -DM     Time Frame (Stairs Goal 1, PT)  long term goal (LTG);10 days  -DM     Progress/Outcome (Stairs Goal 1, PT)  goal met  -DM     Row Name 08/14/21 1305          Patient Education Goal (PT)    Activity (Patient Education Goal, PT)  HEP indep  -DM     [place]/Cues/Accuracy (Memory Goal 2, PT)  demonstrates adequately;verbalizes understanding  -DM     Time Frame (Patient Education Goal, PT)  long term goal (LTG);10 days  -DM     Progress/Outcome (Patient Education Goal, PT)  goal met  -DM       User Key  (r) = Recorded By, (t) = Taken By, (c) = Cosigned By    Initials Name Provider Type    Angélica Farrell, PT Physical Therapist        Clinical Impression     Row Name 08/14/21 1302          Pain    Additional Documentation  Pain Scale: Numbers Pre/Post-Treatment (Group)  -DM     Row Name 08/14/21 1309          Pain Scale: Numbers Pre/Post-Treatment    Pretreatment Pain Rating  1/10  -DM     Posttreatment Pain Rating  2/10  -DM     Pain Location - Side  Bilateral  -DM     Pain Location - Orientation  incisional  -DM     Pain Location  neck  -DM     Pain Intervention(s)  Medication (See MAR);Repositioned;Rest;Elevated  -DM     Row Name 08/14/21 0767          Plan of Care Review    Plan of Care Reviewed With  patient  -DM     Progress  improving  -DM     Outcome Summary  STS w/ SBA ( not req.AD), MIP X 10 & bal. activ  near sink (R wx for supp.), amb 550 ft w/ CGA w/ 2 stand.rests (amb 700 ft w/ nsg this AM) & on 4 steps w/ 1 rail & CGA, + performed ther exer all extrem & neck in sit/stand per issued HEP. Met goals & will d/c home this PM w/ S.O. assist. Plans to inqire re: OPPT at f/u neurosx.visit 10-12 days (would benefit d/t persist. ataxia & weakness).  -DM     Row Name 08/14/21 1302          Vital Signs    Pre Systolic BP Rehab  143   -DM     Pre Treatment Diastolic BP  89  -DM     Post Systolic BP Rehab  117  -DM     Post Treatment Diastolic BP  72  -DM     Pretreatment Heart Rate (beats/min)  84  -DM     Posttreatment Heart Rate (beats/min)  89  -DM     Pre SpO2 (%)  96  -DM     O2 Delivery Pre Treatment  room air  -DM     O2 Delivery Intra Treatment  room air  -DM     O2 Delivery Post Treatment  room air  -DM     Pre Patient Position  Sitting  -DM     Intra Patient Position  Standing  -DM     Post Patient Position  Sitting  -DM     Rest Breaks   2  -DM     Row Name 08/14/21 1305          Positioning and Restraints    Pre-Treatment Position  sitting in chair/recliner  -DM     Post Treatment Position  chair  -DM     In Chair  notified nsg;reclined;call light within reach;encouraged to call for assist;exit alarm on;waffle cushion;legs elevated has folded towel behind head to cushion  -DM       User Key  (r) = Recorded By, (t) = Taken By, (c) = Cosigned By    Initials Name Provider Type    Angélica Farrell, PT Physical Therapist        Outcome Measures     Row Name 08/14/21 1305 08/14/21 0835       How much help from another person do you currently need...    Turning from your back to your side while in flat bed without using bedrails?  4  -DM  4  -AH    Moving from lying on back to sitting on the side of a flat bed without bedrails?  4  -DM  4  -AH    Moving to and from a bed to a chair (including a wheelchair)?  3  -DM  3  -AH    Standing up from a chair using your arms (e.g., wheelchair, bedside chair)?  4  -DM  4  -AH    Climbing 3-5 steps with a railing?  3  -DM  3  -AH    To walk in hospital room?  3  -DM  3  -AH    AM-PAC 6 Clicks Score (PT)  21  -DM  21  -AH    Row Name 08/14/21 1305          Functional Assessment    Outcome Measure Options  AM-PAC 6 Clicks Basic Mobility (PT)  -DM       User Key  (r) = Recorded By, (t) = Taken By, (c) = Cosigned By    Initials Name Provider Type    Angélica Farrell, PT Physical Therapist    AH  Dorothea Duggan, RN Registered Nurse        Physical Therapy Education                 Title: PT OT SLP Therapies (Done)     Topic: Physical Therapy (Done)     Point: Mobility training (Done)     Learning Progress Summary           Patient Eager, E,D,H, DU,VU by DM at 8/14/2021 1447    Acceptance, E, VU,NR by LO at 8/13/2021 1337    Comment: patient education regarding sequencing for bed mobility and transfers.                   Point: Home exercise program (Done)     Learning Progress Summary           Patient Eager, E,D,H, DU,VU by DM at 8/14/2021 1447    Acceptance, E, VU,NR by LO at 8/13/2021 1337    Comment: patient education regarding sequencing for bed mobility and transfers.                   Point: Body mechanics (Done)     Learning Progress Summary           Patient Eager, E,D,H, DU,VU by DM at 8/14/2021 1447    Acceptance, E, VU,NR by LO at 8/13/2021 1337    Comment: patient education regarding sequencing for bed mobility and transfers.                   Point: Precautions (Done)     Learning Progress Summary           Patient Eager, E,D,H, DU,VU by DM at 8/14/2021 1447    Acceptance, E, VU,NR by LO at 8/13/2021 1337    Comment: patient education regarding sequencing for bed mobility and transfers.                               User Key     Initials Effective Dates Name Provider Type Discipline     06/16/21 -  Angélica Cosby, PT Physical Therapist PT     06/16/21 -  Amanda Hacnock, BOUBACAR Physical Therapist PT              PT Recommendation and Plan     Plan of Care Reviewed With: patient  Progress: improving  Outcome Summary: STS w/ SBA ( not req.AD), MIP X 10 & bal. activ  near sink (R wx for supp.), amb 550 ft w/ CGA w/ 2 stand.rests (amb 700 ft w/ nsg this AM) & on 4 steps w/ 1 rail & CGA, + performed ther exer all extrem & neck in sit/stand per issued HEP. Met goals & will d/c home this PM w/ S.O. assist. Plans to inqire re: OPPT at f/u neurosx.visit 10-12 days (would benefit d/t persist. ataxia &  weakness).     Time Calculation:   PT Charges     Row Name 08/14/21 1448             Time Calculation    Start Time  1305  -DM      PT Received On  08/14/21  -DM      PT Goal Re-Cert Due Date  08/23/21  -DM         Time Calculation- PT    Total Timed Code Minutes- PT  56 minute(s)  -DM         Timed Charges    70516 - PT Therapeutic Exercise Minutes  25  -DM      91629 - Gait Training Minutes   17  -DM      94780 - PT Therapeutic Activity Minutes  14  -DM         Total Minutes    Timed Charges Total Minutes  56  -DM       Total Minutes  56  -DM        User Key  (r) = Recorded By, (t) = Taken By, (c) = Cosigned By    Initials Name Provider Type    Angélica Farrell, PT Physical Therapist        Therapy Charges for Today     Code Description Service Date Service Provider Modifiers Qty    16917471833 HC PT THER PROC EA 15 MIN 8/14/2021 Angélica Cosby, PT GP 2    43215782808 HC GAIT TRAINING EA 15 MIN 8/14/2021 Angélica Cosby, PT GP 1    43785409753 HC PT THERAPEUTIC ACT EA 15 MIN 8/14/2021 Angélica Cosby, PT GP 1          PT G-Codes  Outcome Measure Options: AM-PAC 6 Clicks Basic Mobility (PT)  AM-PAC 6 Clicks Score (PT): 21         Angélica Cosby, PT  8/14/2021

## 2021-08-14 NOTE — PROGRESS NOTES
"NEUROSURGERY PROGRESS NOTE     LOS: 2 days   Patient Care Team:  Jim Lopez MD as PCP - General  Jim Lopez MD as PCP - [facility]  BlairYonny bocanegra MD as Referring Physician (Neurology)    Chief Complaint: Gait difficulty.    POD#: 2 Days Post-Op  Procedures:  C3-7 dorsal decompression with fusion and stabilization.    Interval History:   Patient Complaints: Incisional pain, but much improved.  Patient Denies: Weakness or numbness.    Vital Signs: Blood pressure 122/80, pulse 77, temperature 99.2 °F (37.3 °C), temperature source Oral, resp. rate 16, height 177.8 cm (70\"), weight 77.6 kg (171 lb), SpO2 96 %.  Intake/Output:     Intake/Output Summary (Last 24 hours) at 8/14/2021 0804  Last data filed at 8/14/2021 0600  Gross per 24 hour   Intake 840 ml   Output 1825 ml   Net -985 ml     Drain output: 120/105 mL.    Physical Exam:  The patient is awake and alert.  He is sitting up in bed and appears more comfortable.  Dry dressing is in place on his dressing.     Assessment/Plan:  1.  Cervical spondylosis and stenosis with myelopathy status post C3-7 dorsal decompression with fusion and stabilization.  2.  History of hypertension.  3.  Disposition: Mobilize patient.  DC drain later this afternoon and then home thereafter.  Follow-up with PA-AARON in the office in approximately 10-12 days for suture removal.    Osmani Mckeon MD  08/14/21  08:04 EDT    "

## 2021-08-14 NOTE — PLAN OF CARE
Problem: Adult Inpatient Plan of Care  Goal: Plan of Care Review  Recent Flowsheet Documentation  Taken 8/14/2021 1305 by Angélica Cosby, PT  Progress: improving  Plan of Care Reviewed With: patient  Outcome Summary: STS w/ SBA ( not req.AD), MIP X 10 & bal. activ  near sink (R wx for supp.), amb 550 ft w/ CGA w/ 2 stand.rests (amb 700 ft w/ nsg this AM) & on 4 steps w/ 1 rail & CGA, + performed ther exer all extrem & neck in sit/stand per issued HEP. Met goals & will d/c home this PM w/ S.O. assist. Plans to inqire re: OPPT at f/u neurosx.visit 10-12 days (would benefit d/t persist. ataxia & weakness).   Goal Outcome Evaluation:  Plan of Care Reviewed With: patient        Progress: improving  Outcome Summary: STS w/ SBA ( not req.AD), MIP X 10 & bal. activ  near sink (R wx for supp.), amb 550 ft w/ CGA w/ 2 stand.rests (amb 700 ft w/ nsg this AM) & on 4 steps w/ 1 rail & CGA, + performed ther exer all extrem & neck in sit/stand per issued HEP. Met goals & will d/c home this PM w/ S.O. assist. Plans to inqire re: OPPT at f/u neurosx.visit 10-12 days (would benefit d/t persist. ataxia & weakness).

## 2021-08-17 NOTE — DISCHARGE SUMMARY
[facility]    Date of Admission: 8/12/2021  Date of Discharge:  8/17/2021    Discharge Diagnosis: Cervical spondylosis and stenosis with myelopathy     Procedures Performed  Procedure(s):  POSTERIOR CERVICAL FUSION VERTEX SYSTEM, LAMINECTOMIES & FUSION C3-7       Presenting Problem  Cervical spondylosis with myelopathy [M47.12]     History of Present Illness  Patient is a 70 y.o. male with a history of anterior cervical surgery with Dr. Robbins in 2002.  He presented to our office with progressive gait dysfunction.  Preoperative imaging demonstrated severe diffuse cervical stenosis with cord compression.  Surgical intervention was indicated and he underwent an uncomplicated C3-7 dorsal decompression with fusion and stabilization on 8/12/2021.      Hospital Course    A ZITA drain was left in place with moderate output on POD 1.  Drain output diminished on POD 2 and the drain was removed prior to discharge.  Over the course of his hospital stay, vitals remained stable and his/her post-op dressing was clean, dry and intact.  Motor and sensory function were found to be intact throughout.  He was ambulatory, voiding independently, and tolerated PO without associated nausea or vomiting. Pain was minimal and well controlled with oral medications at the time of discharge on POD 2, 8/14/2021.    Condition on Discharge: Stable  Discharge to: Home    PATIENT SPECIFIC EDUCATION/PLAN:  1. Follow-up with Neurosurgery in 3 weeks.  2. No driving until follow-up.  3. No lifting greater than 10 pounds  4. The patient may get incision wet in the shower beginning on 8/17/2021.   5. NO tub bathing or swimming until follow-up  6. Ice pack to incision(s) as needed for associated pain or swelling     Discharge Medications     Discharge Medications      New Medications      Instructions Start Date   traMADol 50 MG tablet  Commonly known as: ULTRAM   50 mg, Oral, Every 6 Hours PRN          Continue These Medications      Instructions Start Date   ascorbic acid 500 MG tablet  Commonly known as: VITAMIN C   500 mg, Oral, Daily      aspirin 81 MG chewable tablet   81 mg, Oral, Daily      hydroCHLOROthiazide 12.5 MG capsule  Commonly known as: MICROZIDE   12.5 mg, Oral, Daily      losartan 50 MG tablet  Commonly known as: COZAAR   50 mg, Oral, Daily      lovastatin 40 MG 24 hr tablet  Commonly known as: ALTOPREV   40 mg, Oral, Nightly      metroNIDAZOLE 1 % gel  Commonly known as: METROGEL   1 application, Topical, Daily PRN      multivitamin tablet tablet   1 tablet, Oral, Daily      tamsulosin 0.4 MG capsule 24 hr capsule  Commonly known as: FLOMAX   1 capsule, Oral, Every Night at Bedtime             Follow-up Appointments  No future appointments.  Additional Instructions for the Follow-ups that You Need to Schedule     Discharge Follow-up with Specified Provider: Mike; 3 Weeks   As directed      To: Mike    Follow Up: 3 Weeks    Follow Up Details: Follow-up with physician's assistant in approximately 10-12 days with [facility]MAN for suture removal               Referring Provider  MD ANTHONY Nagy Joseph E, MD Lyndsey Witt, PA-C  08/17/21  11:12 EDT

## 2021-08-18 ENCOUNTER — TELEPHONE (OUTPATIENT)
Dept: NEUROSURGERY | Facility: CLINIC | Age: 71
End: 2021-08-18

## 2021-08-18 NOTE — TELEPHONE ENCOUNTER
PATIENT SENT A PICTURE OF HIS INCISION AND WANTED LISSA TO REVIEW IT TO SEE IF IT HAD INFECTION.  IT WENT TO HER EMAIL.  PLEASE REVIEW AND ADVISE    [phone]

## 2021-08-18 NOTE — TELEPHONE ENCOUNTER
Received, reviewed with Jeannette Fowler PA-C, 'Looks normal.'  Called and notified patient and he was super grateful for our call.

## 2021-08-20 ENCOUNTER — TELEPHONE (OUTPATIENT)
Dept: NEUROSURGERY | Facility: CLINIC | Age: 71
End: 2021-08-20

## 2021-08-20 NOTE — TELEPHONE ENCOUNTER
Caller: ROSY QUESADA     Relationship to patient: SELF    Best call back number: [phone]    Patient is needing: PATIENT HAD SURGERY WITH DR. RUSSELL 08-12-21 AND WAS TOLD HE NEEDED TO FOLLOW UP IN 12 DAYS TO HAVE HIS STICHES REMOVED, HE HAS YET TO RECEIVE A CALL TO SCHEDULE THIS.     WARM TRANSFERRED CALL TO [facility] DUE TO BEING SURGERY RELATED.

## 2021-08-24 ENCOUNTER — OFFICE VISIT (OUTPATIENT)
Dept: NEUROSURGERY | Facility: CLINIC | Age: 71
End: 2021-08-24

## 2021-08-24 ENCOUNTER — HOSPITAL ENCOUNTER (OUTPATIENT)
Dept: GENERAL RADIOLOGY | Facility: HOSPITAL | Age: 71
Discharge: HOME | End: 2021-08-24
Admitting: PHYSICIAN ASSISTANT

## 2021-08-24 VITALS
BODY MASS INDEX: 23.62 KG/M2 | DIASTOLIC BLOOD PRESSURE: 62 MMHG | HEIGHT: 70 IN | TEMPERATURE: 97.9 F | WEIGHT: 165 LBS | SYSTOLIC BLOOD PRESSURE: 118 MMHG

## 2021-08-24 DIAGNOSIS — M47.12 CERVICAL SPONDYLOSIS WITH MYELOPATHY: ICD-10-CM

## 2021-08-24 DIAGNOSIS — M48.02 CERVICAL SPINAL STENOSIS: Primary | ICD-10-CM

## 2021-08-24 DIAGNOSIS — M48.02 CERVICAL SPINAL STENOSIS: ICD-10-CM

## 2021-08-24 DIAGNOSIS — M53.9 MULTILEVEL DEGENERATIVE DISC DISEASE: ICD-10-CM

## 2021-08-24 DIAGNOSIS — M50.30 DDD (DEGENERATIVE DISC DISEASE), CERVICAL: ICD-10-CM

## 2021-08-24 PROCEDURE — 99024 POSTOP FOLLOW-UP VISIT: CPT | Performed by: PHYSICIAN ASSISTANT

## 2021-08-24 PROCEDURE — 72040 X-RAY EXAM NECK SPINE 2-3 VW: CPT

## 2021-08-24 NOTE — PROGRESS NOTES
Patient: Vladimir Haq  : 1950  Chart #: [id number]    Date of Service: 2021    CHIEF COMPLAINT: Cervical spondylosis and stenosis with myelopathy    History of Present Illness Patient is a 70-year-old gentleman who presented with progressive gait dysfunction.  Previously, he had undergone ACDF with Dr. Robbins.  Recent studies demonstrated severe diffuse cervical stenosis with cord compression.  As such on 2021 he underwent decompressive laminectomies C3-C7 with posterior lateral fusion.    Today patient is 2 weeks post-op. He is doing very well. His gait has improved. He denies symptoms into his arms. He has not had much in the way of neck pain. He quit taking tramadol 2 days ago. He denies incisional issues.       Past Medical History:   Diagnosis Date   • Arthritis    • Cancer (CMS/HCC)     Basal cell skin cancer   • Hyperlipidemia    • Hypertension    • Rosacea    • Wears glasses          Current Outpatient Medications:   •  ascorbic acid (VITAMIN C) 500 MG tablet, Take 500 mg by mouth Daily., Disp: , Rfl:   •  aspirin 81 MG chewable tablet, Chew 81 mg Daily., Disp: , Rfl:   •  hydroCHLOROthiazide (MICROZIDE) 12.5 MG capsule, Take 12.5 mg by mouth Daily., Disp: , Rfl:   •  losartan (COZAAR) 50 MG tablet, Take 50 mg by mouth Daily., Disp: , Rfl:   •  lovastatin (ALTOPREV) 40 MG 24 hr tablet, Take 40 mg by mouth Every Night., Disp: , Rfl:   •  metroNIDAZOLE (METROGEL) 1 % gel, Apply 1 application topically to the appropriate area as directed Daily As Needed (Rosacea)., Disp: , Rfl:   •  multivitamin (MULTI VITAMIN PO), Take 1 tablet by mouth Daily., Disp: , Rfl:   •  tamsulosin (FLOMAX) 0.4 MG capsule 24 hr capsule, Take 1 capsule by mouth every night at bedtime., Disp: , Rfl:   •  traMADol (ULTRAM) 50 MG tablet, Take 1 tablet by mouth Every 6 (Six) Hours As Needed for Moderate Pain ., Disp: 25 tablet, Rfl: 1    Past Surgical History:   Procedure Laterality Date   • CERVICAL DISC SURGERY   "2002    Dr. Robbins, [facility]    • COLONOSCOPY  2016   • HIP ARTHROPLASTY Right 12/03/2018    Dr. Kimble   • TONSILLECTOMY         Social History     Socioeconomic History   • Marital status:      Spouse name: Not on file   • Number of children: Not on file   • Years of education: Not on file   • Highest education level: Not on file   Tobacco Use   • Smoking status: Never Smoker   • Smokeless tobacco: Never Used   Vaping Use   • Vaping Use: Never used   Substance and Sexual Activity   • Alcohol use: Yes     Comment: 14 drinks per week.   • Drug use: Never   • Sexual activity: Defer         Review of Systems   Musculoskeletal: Positive for neck pain and neck stiffness.   All other systems reviewed and are negative.      Objective   Vital Signs: Blood pressure 118/62, temperature 97.9 °F (36.6 °C), height 177.8 cm (70\"), weight 74.8 kg (165 lb).  Physical Exam  Skin:     Comments: Nylon sutures were removed in a clean fashion and incision remained intact without signs or symptoms of infection     Musculoskeletal:     Patient is able to ambulate independently in the room. Legs are a bit stiff. He uses a rolling walker for assistance generally.          Assessment/Plan   Diagnosis: Cervical spondylosis and stenosis with myelopathy status post C3-7 dorsal decompression with fusion and stabilization    Medical Decision Making: Patient is 2 weeks post-op and doing very well.  His sutures were removed and his incision is healing very nicely. I discussed further wound care instructions as well as post-op restrictions. He is very pleased with the improvements in his gait. We may consider some formal therapy in the coming weeks. He will follow up in 6 weeks to check on things. I asked him to get some xrays on the way out.    Diagnoses and all orders for this visit:    1. Cervical spinal stenosis (Primary)  -     XR Spine Cervical 2 View; Future    2. Multilevel degenerative disc disease  -     XR Spine " Cervical 2 View; Future    3. Cervical spondylosis with myelopathy  -     XR Spine Cervical 2 View; Future    4. DDD (degenerative disc disease), cervical  -     XR Spine Cervical 2 View; Future                          Amita Sanabria PA-C  Patient Care Team:  Jim Lopez MD as PCP - General  Jim Lopez MD as PCP - Family Medicine  Yonny Blair MD as Referring Physician (Neurology)

## 2021-09-13 ENCOUNTER — TELEPHONE (OUTPATIENT)
Dept: NEUROSURGERY | Facility: CLINIC | Age: 71
End: 2021-09-13

## 2021-09-13 NOTE — TELEPHONE ENCOUNTER
Yes, driving is fine.   - he might want to take a few more stops to stretch out     XRAYs looked great

## 2021-09-13 NOTE — TELEPHONE ENCOUNTER
Provider:  Mike  Caller: patient  Time of call:     Phone #:  [phone]  Surgery:  Posterior cervical fusion  Surgery Date:  08/12/21  Last visit:   08/24/21  Next visit: 10/05/21    VICTORINO:         Reason for call:     Patient called and said he is going to be traveling (driving) 660 miles.  He is going to break the trip up in 1/2 so 330 miles each trip.    He wants to make sure that's okay and asked if his x-rays of his cervical spine are normal?

## 2021-10-05 ENCOUNTER — OFFICE VISIT (OUTPATIENT)
Dept: NEUROSURGERY | Facility: CLINIC | Age: 71
End: 2021-10-05

## 2021-10-05 VITALS — TEMPERATURE: 97.7 F | HEIGHT: 70 IN | BODY MASS INDEX: 24.65 KG/M2 | WEIGHT: 172.2 LBS

## 2021-10-05 DIAGNOSIS — M47.12 CERVICAL SPONDYLOSIS WITH MYELOPATHY: Primary | ICD-10-CM

## 2021-10-05 DIAGNOSIS — M48.02 CERVICAL SPINAL STENOSIS: ICD-10-CM

## 2021-10-05 DIAGNOSIS — Z98.1 S/P CERVICAL SPINAL FUSION: ICD-10-CM

## 2021-10-05 PROCEDURE — 99024 POSTOP FOLLOW-UP VISIT: CPT | Performed by: NEUROLOGICAL SURGERY

## 2021-10-05 NOTE — PROGRESS NOTES
Patient: Vladimir Haq  : 1950    Primary Care Provider: Jim Lopez MD    Requesting Provider: As above        History    Chief Complaint: Progressive gait dysfunction.    History of Present Illness: Mr. Haq is a 70-year-old gentleman who presented with progressive gait dysfunction.  Previously, he had undergone ACDF with Dr. Robbins.  Recent studies demonstrated severe diffuse cervical stenosis with cord compression.  As such on 2021 he underwent decompressive laminectomies C3-C7 with posterior lateral fusion.  He has done well.  His gait is better albeit not perfect.  He has minimal neck discomfort at his incision site.    Review of Systems   Constitutional: Negative for activity change, appetite change, chills, diaphoresis, fatigue, fever and unexpected weight change.   HENT: Negative for congestion, dental problem, drooling, ear discharge, ear pain, facial swelling, hearing loss, mouth sores, nosebleeds, postnasal drip, rhinorrhea, sinus pressure, sinus pain, sneezing, sore throat, tinnitus, trouble swallowing and voice change.    Eyes: Negative for photophobia, pain, discharge, redness, itching and visual disturbance.   Respiratory: Negative for apnea, cough, choking, chest tightness, shortness of breath, wheezing and stridor.    Cardiovascular: Negative for chest pain, palpitations and leg swelling.   Gastrointestinal: Negative for abdominal distention, abdominal pain, anal bleeding, blood in stool, constipation, diarrhea, nausea, rectal pain and vomiting.   Endocrine: Negative for cold intolerance, heat intolerance, polydipsia, polyphagia and polyuria.   Genitourinary: Negative for decreased urine volume, difficulty urinating, dysuria, enuresis, flank pain, frequency, genital sores, hematuria and urgency.   Musculoskeletal: Negative for arthralgias, back pain, gait problem, joint swelling, myalgias, neck pain and neck stiffness.   Skin: Negative for color change, pallor, rash  "and wound.   Allergic/Immunologic: Negative for environmental allergies, food allergies and immunocompromised state.   Neurological: Negative for dizziness, tremors, seizures, syncope, facial asymmetry, speech difficulty, weakness, light-headedness, numbness and headaches.   Hematological: Negative for adenopathy. Does not bruise/bleed easily.   Psychiatric/Behavioral: Negative for agitation, behavioral problems, confusion, decreased concentration, dysphoric mood, hallucinations, self-injury, sleep disturbance and suicidal ideas. The patient is not nervous/anxious and is not hyperactive.    All other systems reviewed and are negative.      The patient's past medical history, past surgical history, family history, and social history have been reviewed at length in the electronic medical record.    Physical Exam:   Temp 97.7 °F (36.5 °C)   Ht 177.8 cm (70\")   Wt 78.1 kg (172 lb 3.2 oz)   BMI 24.71 kg/m²   Cervical incision looks great.  His gait remains somewhat spastic but quite independent.    Medical Decision Making    Data Review:   (All imaging studies were personally reviewed unless stated otherwise)  Plain films from August show excellent positioning of his new construct from C3-7.  Prior anterior construct at C5-6 is intact.    Diagnosis:   Cervical spondylosis and stenosis with myelopathy status post dorsal decompression with fusion and stabilization.    Treatment Options:   I have referred patient for physical therapy for some strengthening and gait work.  He will follow-up in our clinic in about 3 months to check on his progress.  I plan on seeing him about 6 months thereafter with new plain films of the cervical spine.       Diagnosis Plan   1. Cervical spondylosis with myelopathy     2. S/P cervical spinal fusion  Ambulatory Referral to Physical Therapy Evaluate and treat, POST OP; Stretching, ROM, Strengthening   3. Cervical spinal stenosis         Scribed for Osmani Mckeon MD by Gisel Hughes, " BRITNEY 10/5/2021 16:07 EDT      I, Dr. Mckeon, personally performed the services described in the documentation, as scribed in my presence, and it is both accurate and complete.

## 2022-01-05 ENCOUNTER — OFFICE VISIT (OUTPATIENT)
Dept: NEUROSURGERY | Facility: CLINIC | Age: 72
End: 2022-01-05

## 2022-01-05 VITALS
HEART RATE: 70 BPM | DIASTOLIC BLOOD PRESSURE: 70 MMHG | BODY MASS INDEX: 24.77 KG/M2 | TEMPERATURE: 98.2 F | WEIGHT: 173 LBS | SYSTOLIC BLOOD PRESSURE: 124 MMHG | OXYGEN SATURATION: 98 % | HEIGHT: 70 IN

## 2022-01-05 DIAGNOSIS — M50.30 DDD (DEGENERATIVE DISC DISEASE), CERVICAL: ICD-10-CM

## 2022-01-05 DIAGNOSIS — M53.9 MULTILEVEL DEGENERATIVE DISC DISEASE: ICD-10-CM

## 2022-01-05 DIAGNOSIS — Z98.1 S/P CERVICAL SPINAL FUSION: ICD-10-CM

## 2022-01-05 DIAGNOSIS — M47.12 CERVICAL SPONDYLOSIS WITH MYELOPATHY: Primary | ICD-10-CM

## 2022-01-05 DIAGNOSIS — R26.9 NEUROLOGIC GAIT DYSFUNCTION: ICD-10-CM

## 2022-01-05 DIAGNOSIS — M48.02 CERVICAL SPINAL STENOSIS: ICD-10-CM

## 2022-01-05 PROCEDURE — 99213 OFFICE O/P EST LOW 20 MIN: CPT | Performed by: PHYSICIAN ASSISTANT

## 2022-01-05 NOTE — PROGRESS NOTES
Patient: Vladimir Haq  : 1950  Chart #: [id number]    Date of Service: 2022    CHIEF COMPLAINT:     History of Present Illness       Past Medical History:   Diagnosis Date   • Arthritis    • Cancer (HCC)     Basal cell skin cancer   • Hyperlipidemia    • Hypertension    • Rosacea    • Wears glasses          Current Outpatient Medications:   •  ascorbic acid (VITAMIN C) 500 MG tablet, Take 500 mg by mouth Daily., Disp: , Rfl:   •  aspirin 81 MG chewable tablet, Chew 81 mg Daily., Disp: , Rfl:   •  hydroCHLOROthiazide (MICROZIDE) 12.5 MG capsule, Take 12.5 mg by mouth Daily., Disp: , Rfl:   •  losartan (COZAAR) 50 MG tablet, Take 50 mg by mouth Daily., Disp: , Rfl:   •  lovastatin (ALTOPREV) 40 MG 24 hr tablet, Take 40 mg by mouth Every Night., Disp: , Rfl:   •  metroNIDAZOLE (METROGEL) 1 % gel, Apply 1 application topically to the appropriate area as directed Daily As Needed (Rosacea)., Disp: , Rfl:   •  multivitamin (MULTI VITAMIN PO), Take 1 tablet by mouth Daily., Disp: , Rfl:   •  tamsulosin (FLOMAX) 0.4 MG capsule 24 hr capsule, Take 1 capsule by mouth every night at bedtime., Disp: , Rfl:   •  traMADol (ULTRAM) 50 MG tablet, Take 1 tablet by mouth Every 6 (Six) Hours As Needed for Moderate Pain ., Disp: 25 tablet, Rfl: 1    Past Surgical History:   Procedure Laterality Date   • CERVICAL DISC SURGERY      Dr. Robbins, [facility]    • CERVICAL DISCECTOMY POSTERIOR FUSION WITH BRAIN LAB N/A 2021    Procedure: POSTERIOR CERVICAL FUSION VERTEX SYSTEM, LAMINECTOMIES & FUSION C3-7;  Surgeon: Osmani Mckeon MD;  Location: [facility];  Service: Neurosurgery;  Laterality: N/A;   • COLONOSCOPY  2016   • HIP ARTHROPLASTY Right 2018    Dr. Kimble   • TONSILLECTOMY         Social History     Socioeconomic History   • Marital status:    Tobacco Use   • Smoking status: Never Smoker   • Smokeless tobacco: Never Used   Vaping Use   • Vaping Use: Never used   Substance and  Sexual Activity   • Alcohol use: Yes     Comment: 14 drinks per week.   • Drug use: Never   • Sexual activity: Defer         Review of Systems   Constitutional: Negative for activity change, appetite change, chills, diaphoresis, fatigue, fever and unexpected weight change.   HENT: Positive for congestion and rhinorrhea. Negative for dental problem, drooling, ear discharge, ear pain, facial swelling, hearing loss, mouth sores, nosebleeds, postnasal drip, sinus pressure, sinus pain, sneezing, sore throat, tinnitus, trouble swallowing and voice change.    Eyes: Negative for photophobia, pain, discharge, redness, itching and visual disturbance.   Respiratory: Negative for apnea, cough, choking, chest tightness, shortness of breath, wheezing and stridor.    Cardiovascular: Negative for chest pain, palpitations and leg swelling.   Gastrointestinal: Negative for abdominal distention, abdominal pain, anal bleeding, blood in stool, constipation, diarrhea, nausea, rectal pain and vomiting.   Endocrine: Negative for cold intolerance, heat intolerance, polydipsia, polyphagia and polyuria.   Genitourinary: Negative for decreased urine volume, difficulty urinating, dysuria, enuresis, flank pain, frequency, genital sores, hematuria and urgency.   Musculoskeletal: Negative for arthralgias, back pain, gait problem, joint swelling, myalgias, neck pain and neck stiffness.   Skin: Negative for color change, pallor, rash and wound.   Allergic/Immunologic: Negative for environmental allergies, food allergies and immunocompromised state.   Neurological: Negative for dizziness, tremors, seizures, syncope, facial asymmetry, speech difficulty, weakness, light-headedness, numbness and headaches.   Hematological: Negative for adenopathy. Does not bruise/bleed easily.   Psychiatric/Behavioral: Negative for agitation, behavioral problems, confusion, decreased concentration, dysphoric mood, hallucinations, self-injury, sleep disturbance and  "suicidal ideas. The patient is not nervous/anxious and is not hyperactive.    All other systems reviewed and are negative.      Objective   Vital Signs: Blood pressure 124/70, pulse 70, temperature 98.2 °F (36.8 °C), height 177.8 cm (70\"), weight 78.5 kg (173 lb), SpO2 98 %.  Physical Exam  Musculoskeletal:     Strength is intact in upper and lower extremities to direct testing.     Station and gait are normal.     Straight leg raising is negative.   Neurologic:     Muscle tone is normal throughout.     Coordination is intact.     Deep tendon reflexes: 2+ and symmetrical.     Sensation is intact to light touch throughout.     Patient is oriented to person, place, and time.         Independent review of radiographic imaging:     Assessment/Plan   Diagnosis:    Medical Decision Making:     There are no diagnoses linked to this encounter.                    Patient's Body mass index is 24.82 kg/m². indicating that he is {weight categories:16365}.         Deidra Mejia MA  Patient Care Team:  Jim Lopez MD as PCP - General  Jim Lopez MD as PCP - Family Medicine  Yonny Blair MD as Referring Physician (Neurology)              "

## 2022-01-05 NOTE — PROGRESS NOTES
Patient: Vladimir Haq  : 1950  Chart #: [id number]    Date of Service: 2022    CHIEF COMPLAINT: Cervical spondylosis and stenosis with myelopathy    History of Present Illness Patient is a 71-year-old gentleman who presented with progressive gait dysfunction.  Previously, he had undergone ACDF with Dr. Robbins.  Recent studies demonstrated severe diffuse cervical stenosis with cord compression.  As such on 2021 he underwent decompressive laminectomies C3-C7 with posterior lateral fusion. Postoperatively he has done well. He has had steady progress with his gait but continues with some spasticity. He has undergone physical therapy and continues in that regard. He denies pain, numbness or weakness into his arms. He complains of posterior neck pain with extension. He has some mild hoarseness.      Past Medical History:   Diagnosis Date   • Arthritis    • Cancer (HCC)     Basal cell skin cancer   • Hyperlipidemia    • Hypertension    • Rosacea    • Wears glasses          Current Outpatient Medications:   •  ascorbic acid (VITAMIN C) 500 MG tablet, Take 500 mg by mouth Daily., Disp: , Rfl:   •  aspirin 81 MG chewable tablet, Chew 81 mg Daily., Disp: , Rfl:   •  hydroCHLOROthiazide (MICROZIDE) 12.5 MG capsule, Take 12.5 mg by mouth Daily., Disp: , Rfl:   •  losartan (COZAAR) 50 MG tablet, Take 50 mg by mouth Daily., Disp: , Rfl:   •  lovastatin (ALTOPREV) 40 MG 24 hr tablet, Take 40 mg by mouth Every Night., Disp: , Rfl:   •  metroNIDAZOLE (METROGEL) 1 % gel, Apply 1 application topically to the appropriate area as directed Daily As Needed (Rosacea)., Disp: , Rfl:   •  multivitamin (MULTI VITAMIN PO), Take 1 tablet by mouth Daily., Disp: , Rfl:   •  tamsulosin (FLOMAX) 0.4 MG capsule 24 hr capsule, Take 1 capsule by mouth every night at bedtime., Disp: , Rfl:   •  traMADol (ULTRAM) 50 MG tablet, Take 1 tablet by mouth Every 6 (Six) Hours As Needed for Moderate Pain ., Disp: 25 tablet, Rfl: 1    Past  "Surgical History:   Procedure Laterality Date   • CERVICAL DISC SURGERY  2002    Dr. Robbins, [facility]    • CERVICAL DISCECTOMY POSTERIOR FUSION WITH BRAIN LAB N/A 8/12/2021    Procedure: POSTERIOR CERVICAL FUSION VERTEX SYSTEM, LAMINECTOMIES & FUSION C3-7;  Surgeon: Osmani Mckeon MD;  [facility]  Service: Neurosurgery;  Laterality: N/A;   • COLONOSCOPY  2016   • HIP ARTHROPLASTY Right 12/03/2018    Dr. Kimble   • TONSILLECTOMY         Social History     Socioeconomic History   • Marital status:    Tobacco Use   • Smoking status: Never Smoker   • Smokeless tobacco: Never Used   Vaping Use   • Vaping Use: Never used   Substance and Sexual Activity   • Alcohol use: Yes     Comment: 14 drinks per week.   • Drug use: Never   • Sexual activity: Defer         Review of Systems   Musculoskeletal: Positive for neck pain and neck stiffness.   All other systems reviewed and are negative.      Objective   Vital Signs: Blood pressure 124/70, pulse 70, temperature 98.2 °F (36.8 °C), height 177.8 cm (70\"), weight 78.5 kg (173 lb), SpO2 98 %.  Physical Exam  Vitals and nursing note reviewed.   Constitutional:       General: He is not in acute distress.     Appearance: He is well-developed.   HENT:      Head: Normocephalic and atraumatic.   Eyes:      Pupils: Pupils are equal, round, and reactive to light.   Cardiovascular:      Heart sounds: Normal heart sounds.   Pulmonary:      Breath sounds: Normal breath sounds.   Skin:     Comments: Well healed posterior neck incision   Psychiatric:         Behavior: Behavior normal.         Thought Content: Thought content normal.     Musculoskeletal:     Patient is able to ambulate independently in the room. Gait is mildly spastic.       Assessment/Plan   Diagnosis: Cervical spondylosis and stenosis with myelopathy status post C3-7 dorsal decompression with fusion and stabilization    Medical Decision Making: Patient continues to make a subtle progress. He " is very motivated. He will continue with formal physical therapy and work on strengthening and gait exercises at home as well. Follow-up in about 3 months with plain films of the cervical spine. Call our office in the interim with any questions or concerns.      Diagnoses and all orders for this visit:    1. Cervical spondylosis with myelopathy (Primary)  -     XR Spine Cervical 2 View; Future    2. Cervical spinal stenosis  -     XR Spine Cervical 2 View; Future    3. Multilevel degenerative disc disease  -     XR Spine Cervical 2 View; Future    4. DDD (degenerative disc disease), cervical  -     XR Spine Cervical 2 View; Future    5. Neurologic gait dysfunction  -     XR Spine Cervical 2 View; Future    6. S/P cervical spinal fusion  -     XR Spine Cervical 2 View; Future                          Amita Sanabria PA-C  Patient Care Team:  Jim Lopez MD as PCP - General  Jim Lopez MD as PCP - Family Medicine  Yonny Blair MD as Referring Physician (Neurology)

## 2022-04-04 ENCOUNTER — HOSPITAL ENCOUNTER (OUTPATIENT)
Dept: GENERAL RADIOLOGY | Facility: HOSPITAL | Age: 72
Discharge: HOME | End: 2022-04-04
Admitting: PHYSICIAN ASSISTANT

## 2022-04-04 DIAGNOSIS — M53.9 MULTILEVEL DEGENERATIVE DISC DISEASE: ICD-10-CM

## 2022-04-04 DIAGNOSIS — M47.12 CERVICAL SPONDYLOSIS WITH MYELOPATHY: ICD-10-CM

## 2022-04-04 DIAGNOSIS — Z98.1 S/P CERVICAL SPINAL FUSION: ICD-10-CM

## 2022-04-04 DIAGNOSIS — M48.02 CERVICAL SPINAL STENOSIS: ICD-10-CM

## 2022-04-04 DIAGNOSIS — R26.9 NEUROLOGIC GAIT DYSFUNCTION: ICD-10-CM

## 2022-04-04 DIAGNOSIS — M50.30 DDD (DEGENERATIVE DISC DISEASE), CERVICAL: ICD-10-CM

## 2022-04-04 PROCEDURE — 72040 X-RAY EXAM NECK SPINE 2-3 VW: CPT

## 2022-04-05 ENCOUNTER — OFFICE VISIT (OUTPATIENT)
Dept: NEUROSURGERY | Facility: CLINIC | Age: 72
End: 2022-04-05

## 2022-04-05 VITALS
HEIGHT: 70 IN | SYSTOLIC BLOOD PRESSURE: 124 MMHG | TEMPERATURE: 97.1 F | DIASTOLIC BLOOD PRESSURE: 78 MMHG | WEIGHT: 174.4 LBS | BODY MASS INDEX: 24.97 KG/M2

## 2022-04-05 DIAGNOSIS — M47.12 CERVICAL SPONDYLOSIS WITH MYELOPATHY: Primary | ICD-10-CM

## 2022-04-05 PROCEDURE — 99213 OFFICE O/P EST LOW 20 MIN: CPT | Performed by: PHYSICIAN ASSISTANT

## 2022-04-05 RX ORDER — LOVASTATIN 40 MG/1
40 TABLET ORAL DAILY
COMMUNITY
Start: 2022-01-14

## 2022-04-05 NOTE — PROGRESS NOTES
Patient: Vladimir Haq  : 1950  Gender: male    Primary Care Provider: Jim Lopez MD    Requesting Provider:  No referring provider defined for this encounter.     Chief Complaint: Gait disturbance    History of Present Illness:  This is a 71-year-old gentleman who is well-known to our clinic.  He has a history of ACDF with Dr. Robbins.  More recently presented with progressive gait dysfunction.  Studies demonstrated diffuse cervical stenosis with cord compression.  As such on 2021 he underwent decompressive laminectomies C3-C7 with posterior lateral fusion.  Postoperatively he has done well.  He continues to have difficulties with his gait.  He is working diligently with physical therapy.  He denies any new progressive symptoms today.  He presents today with cervical x-rays.      Past Medical and Surgical History:  Past Medical History:   Diagnosis Date   • Arthritis    • Cancer (HCC)     Basal cell skin cancer   • Hyperlipidemia    • Hypertension    • Rosacea    • Wears glasses      Past Surgical History:   Procedure Laterality Date   • CERVICAL DISC SURGERY      Dr. Robbins, [facility]    • CERVICAL DISCECTOMY POSTERIOR FUSION WITH BRAIN LAB N/A 2021    Procedure: POSTERIOR CERVICAL FUSION VERTEX SYSTEM, LAMINECTOMIES & FUSION C3-7;  Surgeon: Osmani Mckeon MD;  [facility];  Service: Neurosurgery;  Laterality: N/A;   • COLONOSCOPY  2016   • HIP ARTHROPLASTY Right 2018    Dr. Kimble   • TONSILLECTOMY         Current Medications:    Current Outpatient Medications:   •  ascorbic acid (VITAMIN C) 500 MG tablet, Take 500 mg by mouth Daily., Disp: , Rfl:   •  aspirin 81 MG chewable tablet, Chew 81 mg Daily., Disp: , Rfl:   •  hydroCHLOROthiazide (MICROZIDE) 12.5 MG capsule, Take 12.5 mg by mouth Daily., Disp: , Rfl:   •  losartan (COZAAR) 50 MG tablet, Take 50 mg by mouth Daily., Disp: , Rfl:   •  lovastatin (MEVACOR) 40 MG tablet, Take 40 mg by mouth  "Daily. for cholesterol, Disp: , Rfl:   •  metroNIDAZOLE (METROGEL) 1 % gel, Apply 1 application topically to the appropriate area as directed Daily As Needed (Rosacea)., Disp: , Rfl:   •  multivitamin (THERAGRAN) tablet tablet, Take 1 tablet by mouth Daily., Disp: , Rfl:   •  tamsulosin (FLOMAX) 0.4 MG capsule 24 hr capsule, Take 1 capsule by mouth every night at bedtime., Disp: , Rfl:   •  traMADol (ULTRAM) 50 MG tablet, Take 1 tablet by mouth Every 6 (Six) Hours As Needed for Moderate Pain ., Disp: 25 tablet, Rfl: 1    Allergies:  No Known Allergies      Review of Systems   All other systems reviewed and are negative.        Physical Exam  Constitutional:       Appearance: Normal appearance.   HENT:      Head: Normocephalic and atraumatic.   Musculoskeletal:         General: Normal range of motion.      Cervical back: Normal range of motion and neck supple.   Skin:     General: Skin is warm and dry.   Neurological:      Mental Status: He is alert and oriented to person, place, and time.      Sensory: Sensation is intact.      Motor: Motor function is intact.      Coordination: Coordination is intact.      Gait: Gait is intact.      Comments: Spastic gait is noted.   Psychiatric:         Mood and Affect: Mood normal.         Behavior: Behavior normal.           Vitals:    04/05/22 1234   BP: 124/78   BP Location: Left arm   Patient Position: Sitting   Cuff Size: Adult   Temp: 97.1 °F (36.2 °C)   TempSrc: Infrared   Weight: 79.1 kg (174 lb 6.4 oz)   Height: 177.8 cm (70\")       Patient's Body mass index is 25.02 kg/m². indicating that he is within normal range (BMI 18.5-24.9). No BMI management plan needed..    Independent Review of Diagnostic Imaging:  Cervical x-rays performed 4/4/2022 demonstrates good positioning of his construct C3-7.    Assessment:  1.  Cervical spondylosis and stenosis with myelopathy  2.  S/p cervical decompression and fusion    Plan:  Mr. Haq continues to do well following a cervical " laminectomy with posterior lateral fusion C3-7 around 8 months ago.  He continues to have some difficulty with spastic gait, however continues with PT and exercises in his home diligently.  Overall he is making some progress.  We will see him back in around 4-5 months with repeat cervical plain films.  He will call with concerns prior to that.      Anna Marie Eaton PA-C

## 2022-07-13 ENCOUNTER — HOSPITAL ENCOUNTER (OUTPATIENT)
Dept: GENERAL RADIOLOGY | Facility: HOSPITAL | Age: 72
Discharge: HOME | End: 2022-07-13
Admitting: NURSE PRACTITIONER

## 2022-07-13 ENCOUNTER — TRANSCRIBE ORDERS (OUTPATIENT)
Dept: ADMINISTRATIVE | Facility: HOSPITAL | Age: 72
End: 2022-07-13

## 2022-07-13 DIAGNOSIS — M25.521 RIGHT ELBOW PAIN: Primary | ICD-10-CM

## 2022-07-13 PROCEDURE — 73070 X-RAY EXAM OF ELBOW: CPT

## 2022-08-09 ENCOUNTER — HOSPITAL ENCOUNTER (OUTPATIENT)
Dept: GENERAL RADIOLOGY | Facility: HOSPITAL | Age: 72
Discharge: HOME | End: 2022-08-09
Admitting: PHYSICIAN ASSISTANT

## 2022-08-09 DIAGNOSIS — M47.12 CERVICAL SPONDYLOSIS WITH MYELOPATHY: ICD-10-CM

## 2022-08-09 PROCEDURE — 72040 X-RAY EXAM NECK SPINE 2-3 VW: CPT

## 2022-08-24 ENCOUNTER — TELEPHONE (OUTPATIENT)
Dept: NEUROSURGERY | Facility: CLINIC | Age: 72
End: 2022-08-24

## 2022-08-24 NOTE — TELEPHONE ENCOUNTER
S/w patient and he saw the COVID positive person 6 days ago. Per Dr. Mckeon's recommendation patient will need to r/s. Routing to schedulers.

## 2022-08-24 NOTE — TELEPHONE ENCOUNTER
Is he immunized? Does he have any symptoms at all?   If he is immunized and completely asymptomatic, he may come to his appointment with an N95 mask.

## 2022-08-24 NOTE — TELEPHONE ENCOUNTER
Provider: Mike    Surgery/Procedure: Surgery with Osmani Mckeon MD (08/12/2021)     Last visit: Office Visit with Anna Marie Eaton PA-C (04/05/2022)      Next visit: Appointment with Osmani Mckeon MD (08/24/2022)       Reason for call:  Pt LVM stating that he has been exposed to someone who has tested positive for COVID. Pt states he tested himself and home and it was negative. Pt wanting to know if he needed to reschedule his appointment for today with Dr. Mckeon. Please advise.

## 2022-08-24 NOTE — TELEPHONE ENCOUNTER
Per Dr. Mckeon: If the exposure was within the last five days, the patient needs to r/s his appointment, and this needs to be at least 7 days out.

## 2022-09-07 ENCOUNTER — OFFICE VISIT (OUTPATIENT)
Dept: NEUROSURGERY | Facility: CLINIC | Age: 72
End: 2022-09-07

## 2022-09-07 VITALS — TEMPERATURE: 97.1 F | WEIGHT: 172.6 LBS | BODY MASS INDEX: 24.77 KG/M2

## 2022-09-07 DIAGNOSIS — Z98.1 S/P CERVICAL SPINAL FUSION: Primary | ICD-10-CM

## 2022-09-07 DIAGNOSIS — M47.12 CERVICAL SPONDYLOSIS WITH MYELOPATHY: ICD-10-CM

## 2022-09-07 PROCEDURE — 99213 OFFICE O/P EST LOW 20 MIN: CPT | Performed by: NEUROLOGICAL SURGERY

## 2022-09-07 RX ORDER — BACLOFEN 5 MG/1
TABLET ORAL
COMMUNITY
Start: 2022-08-21 | End: 2022-12-30 | Stop reason: SDUPTHER

## 2022-09-07 NOTE — PROGRESS NOTES
Patient: Vladimir Haq  : 1950    Primary Care Provider: Jim Lopez MD    Requesting Provider: As above        History    Chief Complaint: Progressive gait dysfunction..    History of Present Illness: Mr. Haq is a 70-year-old gentleman who presented with progressive gait dysfunction.  Previously, he had undergone ACDF with Dr. Robbins.  Recent studies demonstrated severe diffuse cervical stenosis with cord compression.  As such on 2021 he underwent decompressive laminectomies C3-C7 with posterior lateral fusion.  He has done well.  His gait and balance remain poor but better than preoperatively.  He has no significant pain.  He complains of word finding difficulty of its been there since even prior to surgery last year.  He has some dizziness as well.    Review of Systems   Constitutional: Negative for activity change, appetite change, chills, diaphoresis, fatigue, fever and unexpected weight change.   HENT: Negative for congestion, dental problem, drooling, ear discharge, ear pain, facial swelling, hearing loss, mouth sores, nosebleeds, postnasal drip, rhinorrhea, sinus pressure, sinus pain, sneezing, sore throat, tinnitus, trouble swallowing and voice change.    Eyes: Negative for photophobia, pain, discharge, redness, itching and visual disturbance.   Respiratory: Negative for apnea, cough, choking, chest tightness, shortness of breath, wheezing and stridor.    Cardiovascular: Negative for chest pain, palpitations and leg swelling.   Gastrointestinal: Negative for abdominal distention, abdominal pain, anal bleeding, blood in stool, constipation, diarrhea, nausea, rectal pain and vomiting.   Endocrine: Negative for cold intolerance, heat intolerance, polydipsia, polyphagia and polyuria.   Genitourinary: Negative for decreased urine volume, difficulty urinating, dysuria, enuresis, flank pain, frequency, genital sores, hematuria, penile discharge, penile pain, penile swelling,  scrotal swelling, testicular pain and urgency.   Musculoskeletal: Negative for arthralgias, back pain, gait problem, joint swelling, myalgias, neck pain and neck stiffness.   Skin: Negative for color change, pallor, rash and wound.   Allergic/Immunologic: Negative for environmental allergies, food allergies and immunocompromised state.   Neurological: Positive for speech difficulty. Negative for dizziness, tremors, seizures, syncope, facial asymmetry, weakness, light-headedness, numbness and headaches.   Hematological: Negative for adenopathy. Does not bruise/bleed easily.   Psychiatric/Behavioral: Negative for agitation, behavioral problems, confusion, decreased concentration, dysphoric mood, hallucinations, self-injury, sleep disturbance and suicidal ideas. The patient is not nervous/anxious and is not hyperactive.    All other systems reviewed and are negative.      The patient's past medical history, past surgical history, family history, and social history have been reviewed at length in the electronic medical record.      Physical Exam:   Temp 97.1 °F (36.2 °C) (Infrared)   Wt 78.3 kg (172 lb 9.6 oz)   BMI 24.77 kg/m²   The patient's speech is somewhat halting.  Naming, repetition, comprehension are intact.  He hesitates a fair bit with spontaneous speech.  His gait is a bit unsteady and bouncy but independent.  Alcira signs are negative.    Medical Decision Making    Data Review:   (All imaging studies were personally reviewed unless stated otherwise)  He is asked me to review his brain MRI from May of last year.  I do not see an overt left hemispheric infarct.    Plain films of his neck from 8/9/2022 demonstrate excellent position of his construct C3-7.  There is a slight bit of opening up of the disc space dorsally at C7-T1.  His fusion appears to be solid.    Diagnosis:   Cervical spondylosis and stenosis with myelopathy status post dorsal decompression with fusion and stabilization.    Treatment  Options:   Mr. Neff is doing well.  He will follow-up in my clinic on an as-needed basis.       Diagnosis Plan   1. S/P cervical spinal fusion     2. Cervical spondylosis with myelopathy         Scribed for Osmani Mckeon MD by Gisel Hughes, [facility] 9/7/2022 15:19 EDT      I, Dr. Mckeon, personally performed the services described in the documentation, as scribed in my presence, and it is both accurate and complete.

## 2022-10-20 ENCOUNTER — TRANSCRIBE ORDERS (OUTPATIENT)
Dept: ADMINISTRATIVE | Facility: HOSPITAL | Age: 72
End: 2022-10-20

## 2022-10-20 DIAGNOSIS — G64 ABNORMAL GAIT DUE TO PERIPHERAL SENSORY DISORDER: Primary | ICD-10-CM

## 2022-10-20 DIAGNOSIS — R26.9 ABNORMAL GAIT DUE TO PERIPHERAL SENSORY DISORDER: Primary | ICD-10-CM

## 2022-11-30 ENCOUNTER — APPOINTMENT (OUTPATIENT)
Dept: MRI IMAGING | Facility: HOSPITAL | Age: 72
End: 2022-11-30

## 2022-12-15 ENCOUNTER — APPOINTMENT (OUTPATIENT)
Dept: CT IMAGING | Facility: HOSPITAL | Age: 72
End: 2022-12-15

## 2022-12-15 PROCEDURE — 99283 EMERGENCY DEPT VISIT LOW MDM: CPT

## 2022-12-15 PROCEDURE — 72131 CT LUMBAR SPINE W/O DYE: CPT

## 2022-12-15 PROCEDURE — 72128 CT CHEST SPINE W/O DYE: CPT

## 2022-12-15 RX ORDER — IBUPROFEN 800 MG/1
800 TABLET ORAL ONCE
Status: COMPLETED | OUTPATIENT
Start: 2022-12-15 | End: 2022-12-16

## 2022-12-15 RX ORDER — ACETAMINOPHEN 500 MG
1000 TABLET ORAL ONCE
Status: COMPLETED | OUTPATIENT
Start: 2022-12-15 | End: 2022-12-16

## 2022-12-16 ENCOUNTER — APPOINTMENT (OUTPATIENT)
Dept: GENERAL RADIOLOGY | Facility: HOSPITAL | Age: 72
End: 2022-12-16

## 2022-12-16 ENCOUNTER — TELEPHONE (OUTPATIENT)
Dept: NEUROSURGERY | Facility: CLINIC | Age: 72
End: 2022-12-16

## 2022-12-16 ENCOUNTER — HOSPITAL ENCOUNTER (EMERGENCY)
Facility: HOSPITAL | Age: 72
Discharge: HOME | End: 2022-12-16
Attending: EMERGENCY MEDICINE | Admitting: EMERGENCY MEDICINE

## 2022-12-16 VITALS
HEART RATE: 77 BPM | OXYGEN SATURATION: 96 % | DIASTOLIC BLOOD PRESSURE: 82 MMHG | TEMPERATURE: 97.6 F | RESPIRATION RATE: 18 BRPM | WEIGHT: 170 LBS | SYSTOLIC BLOOD PRESSURE: 143 MMHG | HEIGHT: 70 IN | BODY MASS INDEX: 24.34 KG/M2

## 2022-12-16 DIAGNOSIS — S22.49XS: Primary | ICD-10-CM

## 2022-12-16 DIAGNOSIS — M48.061 NEURAL FORAMINAL STENOSIS OF LUMBAR SPINE: ICD-10-CM

## 2022-12-16 DIAGNOSIS — S22.080A CLOSED WEDGE COMPRESSION FRACTURE OF T11 VERTEBRA, INITIAL ENCOUNTER: ICD-10-CM

## 2022-12-16 PROCEDURE — 71046 X-RAY EXAM CHEST 2 VIEWS: CPT

## 2022-12-16 RX ORDER — TRAMADOL HYDROCHLORIDE 50 MG/1
50 TABLET ORAL EVERY 6 HOURS PRN
Qty: 21 TABLET | Refills: 0 | Status: SHIPPED | OUTPATIENT
Start: 2022-12-16 | End: 2022-12-30

## 2022-12-16 RX ORDER — IBUPROFEN 600 MG/1
600 TABLET ORAL ONCE
Status: DISCONTINUED | OUTPATIENT
Start: 2022-12-16 | End: 2022-12-16

## 2022-12-16 RX ORDER — TRAMADOL HYDROCHLORIDE 50 MG/1
50 TABLET ORAL EVERY 6 HOURS PRN
Status: DISCONTINUED | OUTPATIENT
Start: 2022-12-16 | End: 2022-12-16 | Stop reason: HOSPADM

## 2022-12-16 RX ADMIN — IBUPROFEN 800 MG: 800 TABLET, FILM COATED ORAL at 03:20

## 2022-12-16 RX ADMIN — ACETAMINOPHEN 1000 MG: 500 TABLET ORAL at 03:20

## 2022-12-16 RX ADMIN — TRAMADOL HYDROCHLORIDE 50 MG: 50 TABLET ORAL at 03:20

## 2022-12-16 NOTE — TELEPHONE ENCOUNTER
Caller: Vladimir Haq    Relationship to patient: Self    Best call back number: [phone]    Patient is needing: PATIENT CALLED, PATIENT WAS SEEN IN ER ON 12/16/22 PER ER NOTES: Closed fracture of two ribs, unspecified laterality, sequela, Closed wedge compression fracture of T11 vertebra, initial encounter ([facility]), Neural foraminal stenosis of lumbar spine - Call Osmani Mckeon MD (Neurosurgery); Follow-up for clinic consultation regarding back injury and T11 anterior vertebral fracture.  There is also findings on the lumbar spine, of stenosis, and I recommend comparison to your prior MRI and follow-up with this as well.     CT LUMBAR AND THORACIC 12/16/22 @[facility]    PATIENT WAS SCHEDULED FOR 12/30/22 - SHOULD PATIENT BE SEEN SOONER? PLEASE CALL PATIENT TO ADVISE.    THANK YOU

## 2022-12-16 NOTE — ED PROVIDER NOTES
EMERGENCY DEPARTMENT ENCOUNTER    Pt Name: Vladimir Haq  MRN: [id number]  Pt :   1950  Room Number:    Date of encounter:  12/15/2022  PCP: Jim Lopez MD  ED Provider: Mitul Wellington MD  Seen in triage at 11:45 PM  Historian: Patient      HPI:  Chief Complaint: Back pain        Context: Vladimir Haq is a 72 y.o. male who presents to the ED c/o mid back pain after a slip and fall at home, hitting the mid back, on a [place] countertop at home.  Concerns of injury to the back, also of the ribs here.  Complains of significant pain since the injury in the mid back, worse with movements.  Pain rated 8 out of 10      PAST MEDICAL HISTORY  Past Medical History:   Diagnosis Date   • Arthritis    • Cancer (HCC)     Basal cell skin cancer   • Hyperlipidemia    • Hypertension    • Rosacea    • Wears glasses          PAST SURGICAL HISTORY  Past Surgical History:   Procedure Laterality Date   • CERVICAL DISC SURGERY      Dr. Robbins, [facility]    • CERVICAL DISCECTOMY POSTERIOR FUSION WITH BRAIN LAB N/A 2021    Procedure: POSTERIOR CERVICAL FUSION VERTEX SYSTEM, LAMINECTOMIES & FUSION C3-7;  Surgeon: Osmani Mckeon MD;  Location: [facility];  Service: Neurosurgery;  Laterality: N/A;   • COLONOSCOPY  2016   • HIP ARTHROPLASTY Right 2018    Dr. Kimble   • TONSILLECTOMY           FAMILY HISTORY  Family History   Problem Relation Age of Onset   • Cancer Mother    • Stroke Father          SOCIAL HISTORY  Social History     Socioeconomic History   • Marital status:    Tobacco Use   • Smoking status: Never   • Smokeless tobacco: Never   Vaping Use   • Vaping Use: Never used   Substance and Sexual Activity   • Alcohol use: Yes     Comment: 14 drinks per week.   • Drug use: Never   • Sexual activity: Defer         ALLERGIES  Patient has no known allergies.        REVIEW OF SYSTEMS  Review of Systems     Constitutional: Negative. No fever, no weakness.   HENT:  Negative for sneezing and sore throat.    Respiratory: Negative for cough. Negative for shortness of breath.    Cardiovascular: Negative.  Negative for chest pain.   Gastrointestinal: Negative.  Negative for abdominal pain.   Genitourinary: Negative.  Negative for difficulty urinating.     All systems reviewwed and negative except as noted in HPI.    PHYSICAL EXAM    I have reviewed the triage vital signs and nursing notes.    ED Triage Vitals [12/15/22 2258]   Temp Heart Rate Resp BP SpO2   97.6 °F (36.4 °C) 76 16 147/95 97 %      Temp src Heart Rate Source Patient Position BP Location FiO2 (%)   Oral -- Sitting Left arm --       Physical Exam  GENERAL:   Appears alert, conversant  HENT: Nares patent.  EYES: No scleral icterus.  CV: Regular rhythm, regular rate.  RESPIRATORY: Normal effort.  No audible wheezes, rales or rhonchi.  ABDOMEN: Soft, nontender  MUSCULOSKELETAL: Tenderness on the T12 or 11 area, with an area of ecchymosis also.  Normal gait.  NEURO: Alert, moves all extremities, deep tendon reflexes are strong and brisk at the patella, intact station bilateral lower extremities including the inner thigh, he has no motor deficits and is walking without difficulty.  SKIN: Warm, dry, no rash visualized.        LAB RESULTS  No results found for this or any previous visit (from the past 24 hour(s)).    If labs were ordered, I independently reviewed the results.        RADIOLOGY  XR Chest 2 View    Result Date: 12/16/2022  FRONTAL VIEW OF THE CHEST CLINICAL INDICATION: Trauma. COMPARISON: 11/22/2019. FINDINGS: No focal consolidation, pleural effusion or pneumothorax. Cardiomediastinal morphology is normal.     No acute cardiopulmonary abnormality. Electronically signed by:  Eduard Dawson M.D.  12/15/2022 11:05 PM Mountain Time    CT Thoracic Spine Without Contrast    Result Date: 12/16/2022  EXAMINATION: CT THORACIC SPINE WO CONTRAST, CT LUMBAR SPINE WO CONTRAST DATE: 12/15/2022 11:59 PM  INDICATION: Injury.   COMPARISON: Thoracic spine MRI June 27, 2021.  TECHNIQUE: Noncontrast CT imaging through the thoracic and lumbar spine was performed in the axial plane. Coronal and sagittal reformats were generated. CT dose lowering techniques were used, to include: automated exposure control, adjustment for patient  size, and or use of iterative reconstruction.  FINDINGS: THORACIC SPINE:  Vertebral column: Partially imaged spinal hardware in the cervical spine which appears intact although incompletely covered in the field-of-view of this examination. There is slight exaggeration of the normal thoracic kyphosis. No CT evidence of acute thoracic spine fracture or traumatic subluxation. There is an acute fracture through the anterior vertebral body and osteophytes at T11.. No bony retropulsion or extension into the posterior elements. This is best seen on sagittal image 41. Flattening/chronic wedging of the mid thoracic vertebral bodies  most notably at T7, T8, and T9. Multilevel intervertebral disc space narrowing with degenerative endplate changes and osteophyte formation throughout the thoracic spine. There is an acute mildly displaced fracture of the right posterior 12th rib. Additional nondisplaced fracture of the posterolateral right 11th rib. Mild multilevel spinal canal stenosis throughout the thoracic spine multilevel mild neural foraminal narrowing. Moderate bilateral neuroforaminal stenosis at T7-T8, T8-T9, and T9-T10. Soft tissues: The visualized portions of the lungs are clear. Coronary artery calcifications. Atherosclerosis. LUMBAR SPINE: Vertebral column: There are 5 nonrib-bearing lumbar type vertebral bodies. Slight exaggeration of the normal lumbar lordosis with grade 1 anterolisthesis of L4 on L5. There is a minimal levoconvex curvature in the lumbar spine. No acute fracture or traumatic subluxation of the lumbar spine. Vertebral body heights are maintained. Mild intervertebral disc space narrowing at multiple  levels throughout the lumbar spine. Advanced facet joint degenerative changes. Generalized osseous demineralization. Degenerative changes of the sacroiliac joints bilaterally. T12-L1: There is no significant bony stenosis of the spinal canal or neural foramina. L1-2: Mild spinal canal narrowing. Mild bilateral neuroforaminal stenosis.  L2-3: Moderate spinal canal narrowing. Mild bilateral neuroforaminal stenosis.  L3-4: Moderate to severe spinal canal narrowing. Mild bilateral neuroforaminal stenosis.  L4-5: Severe spinal canal stenosis with marked facet hypertrophy. Mild to moderate bilateral neuroforaminal narrowing. L5-S1: Mild spinal canal narrowing. No significant neuroforaminal stenosis.  Soft tissues: Aortic atherosclerosis. The remaining imaged portions of the abdomen and pelvis are without acute findings.      Thoracic spine 1.  Acute fracture through the anterior vertebral body at T11 without significant height loss or bony retropulsion. 2.  Mildly displaced posterior right 12th rib fracture. Additional nondisplaced posterior right 11th rib fracture. Lumbar spine 1.  No acute fracture or malalignment in the lumbar spine. 2.  Multilevel degenerative changes in the lumbar spine including degenerative anterolisthesis of L4 and L5. There is resulting severe spinal canal stenosis at L3-L4 and L4-L5. Correlate for symptoms of neurogenic claudication and consider spinal surgery  consultation.  Electronically signed by:  Mehul Fish M.D.  12/15/2022 10:37 PM Mountain Time    CT Lumbar Spine Without Contrast    Result Date: 12/16/2022  EXAMINATION: CT THORACIC SPINE WO CONTRAST, CT LUMBAR SPINE WO CONTRAST DATE: 12/15/2022 11:59 PM  INDICATION: Injury.  COMPARISON: Thoracic spine MRI June 27, 2021.  TECHNIQUE: Noncontrast CT imaging through the thoracic and lumbar spine was performed in the axial plane. Coronal and sagittal reformats were generated. CT dose lowering techniques were used, to include: automated  exposure control, adjustment for patient  size, and or use of iterative reconstruction.  FINDINGS: THORACIC SPINE:  Vertebral column: Partially imaged spinal hardware in the cervical spine which appears intact although incompletely covered in the field-of-view of this examination. There is slight exaggeration of the normal thoracic kyphosis. No CT evidence of acute thoracic spine fracture or traumatic subluxation. There is an acute fracture through the anterior vertebral body and osteophytes at T11.. No bony retropulsion or extension into the posterior elements. This is best seen on sagittal image 41. Flattening/chronic wedging of the mid thoracic vertebral bodies  most notably at T7, T8, and T9. Multilevel intervertebral disc space narrowing with degenerative endplate changes and osteophyte formation throughout the thoracic spine. There is an acute mildly displaced fracture of the right posterior 12th rib. Additional nondisplaced fracture of the posterolateral right 11th rib. Mild multilevel spinal canal stenosis throughout the thoracic spine multilevel mild neural foraminal narrowing. Moderate bilateral neuroforaminal stenosis at T7-T8, T8-T9, and T9-T10. Soft tissues: The visualized portions of the lungs are clear. Coronary artery calcifications. Atherosclerosis. LUMBAR SPINE: Vertebral column: There are 5 nonrib-bearing lumbar type vertebral bodies. Slight exaggeration of the normal lumbar lordosis with grade 1 anterolisthesis of L4 on L5. There is a minimal levoconvex curvature in the lumbar spine. No acute fracture or traumatic subluxation of the lumbar spine. Vertebral body heights are maintained. Mild intervertebral disc space narrowing at multiple levels throughout the lumbar spine. Advanced facet joint degenerative changes. Generalized osseous demineralization. Degenerative changes of the sacroiliac joints bilaterally. T12-L1: There is no significant bony stenosis of the spinal canal or neural foramina.  L1-2: Mild spinal canal narrowing. Mild bilateral neuroforaminal stenosis.  L2-3: Moderate spinal canal narrowing. Mild bilateral neuroforaminal stenosis.  L3-4: Moderate to severe spinal canal narrowing. Mild bilateral neuroforaminal stenosis.  L4-5: Severe spinal canal stenosis with marked facet hypertrophy. Mild to moderate bilateral neuroforaminal narrowing. L5-S1: Mild spinal canal narrowing. No significant neuroforaminal stenosis.  Soft tissues: Aortic atherosclerosis. The remaining imaged portions of the abdomen and pelvis are without acute findings.      Thoracic spine 1.  Acute fracture through the anterior vertebral body at T11 without significant height loss or bony retropulsion. 2.  Mildly displaced posterior right 12th rib fracture. Additional nondisplaced posterior right 11th rib fracture. Lumbar spine 1.  No acute fracture or malalignment in the lumbar spine. 2.  Multilevel degenerative changes in the lumbar spine including degenerative anterolisthesis of L4 and L5. There is resulting severe spinal canal stenosis at L3-L4 and L4-L5. Correlate for symptoms of neurogenic claudication and consider spinal surgery  consultation.  Electronically signed by:  Mehul Fish M.D.  12/15/2022 10:37 PM Mountain Time        MEDICATIONS GIVEN IN ER    Medications   acetaminophen (TYLENOL) tablet 1,000 mg (has no administration in time range)   ibuprofen (ADVIL,MOTRIN) tablet 800 mg (has no administration in time range)   traMADol (ULTRAM) tablet 50 mg (has no administration in time range)         PROGRESS, DATA ANALYSIS, CONSULTS, AND MEDICAL DECISION MAKING    All labs have been independently reviewed by me.  All radiology studies have been reviewed by me and the radiologist dictating the report.   EKG's have been independently viewed and interpreted by me.      Differential diagnoses: Back injury, suspect spine fracture, rib fracture, evaluation for chest injury/pneumothorax.                 AS OF 03:17 EST  VITALS:    BP - 147/95  HR - 76  TEMP - 97.6 °F (36.4 °C) (Oral)  O2 SATS - 97%                  DIAGNOSIS  Final diagnoses:   Closed fracture of two ribs, unspecified laterality, sequela   Closed wedge compression fracture of T11 vertebra, initial encounter ([facility])   Neural foraminal stenosis of lumbar spine         DISPOSITION  DISCHARGE    Patient discharged in stable condition.    Reviewed implications of results, diagnosis, meds, responsibility to follow up, warning signs and symptoms of possible worsening, potential complications and reasons to return to ER.    Patient/Family voiced understanding of above instructions.    Discussed plan for discharge, as there is no emergent indication for admission.  Pt/family is agreeable and understands need for follow up and possible repeat testing.  Pt/family is aware that discharge does not mean that nothing is wrong but that it indicates no emergency is currently present that requires admission and they must continue care with follow-up as given below or with a physician of their choice.     FOLLOW-UP  Osmani Mckeon MD  [address] [facility] [address]  [place] [id number] [phone]    Call   Follow-up for clinic consultation regarding back injury and T11 anterior vertebral fracture.  There is also findings on the lumbar spine, of stenosis, and I recommend comparison to your prior MRI and follow-up with this as well.         Medication List      New Prescriptions    traMADol 50 MG tablet  Commonly known as: ULTRAM  Take 1 tablet by mouth Every 6 (Six) Hours As Needed for Moderate Pain.           Where to Get Your Medications      These medications were sent to [address] [facility] - [phone]  - [phone]   [address] [facility] [address]    Phone: [phone]   · traMADol 50 MG tablet                  Mitul Wellington MD  12/16/22 9094

## 2022-12-30 ENCOUNTER — OFFICE VISIT (OUTPATIENT)
Dept: NEUROSURGERY | Facility: CLINIC | Age: 72
End: 2022-12-30

## 2022-12-30 VITALS — WEIGHT: 173.6 LBS | BODY MASS INDEX: 24.85 KG/M2 | HEIGHT: 70 IN | TEMPERATURE: 98 F

## 2022-12-30 DIAGNOSIS — S22.080A CLOSED WEDGE COMPRESSION FRACTURE OF T11 VERTEBRA, INITIAL ENCOUNTER: Primary | ICD-10-CM

## 2022-12-30 DIAGNOSIS — R26.9 NEUROLOGIC GAIT DYSFUNCTION: ICD-10-CM

## 2022-12-30 DIAGNOSIS — M53.9 MULTILEVEL DEGENERATIVE DISC DISEASE: ICD-10-CM

## 2022-12-30 PROCEDURE — 99213 OFFICE O/P EST LOW 20 MIN: CPT | Performed by: NEUROLOGICAL SURGERY

## 2022-12-30 NOTE — PROGRESS NOTES
Patient: Vladimir Haq  : 1950    Primary Care Provider: Jim Lopez MD    Requesting Provider: As above        History    Chief Complaint: Mid back pain.    History of Present Illness: Mr. Neff is a 72-year-old gentleman who had presented with progressive gait dysfunction.  He had previously undergone ACDF by Dr. Robbins.  Studies demonstrated severe diffuse cervical stenosis with cord compression and on 2021 he underwent decompressive laminectomies from C3-7 with fusion and stabilization.  He continues to have gait and balance issues.  2 weeks ago he suffered a fall and struck his back on a [place] countertop.  For a while he had profound mid back pain.  He was seen in the emergency room.  He reportedly has a T11 fracture as well as rib fractures.  His symptoms are much better.  He still gets some symptoms with coughing or sneezing.    Review of Systems   Constitutional: Negative for activity change, appetite change, chills, diaphoresis, fatigue, fever and unexpected weight change.   HENT: Negative for congestion, dental problem, drooling, ear discharge, ear pain, facial swelling, hearing loss, mouth sores, nosebleeds, postnasal drip, rhinorrhea, sinus pressure, sneezing, sore throat, tinnitus, trouble swallowing and voice change.    Eyes: Negative for photophobia, pain, discharge, redness, itching and visual disturbance.   Respiratory: Negative for apnea, cough, choking, chest tightness, shortness of breath, wheezing and stridor.    Cardiovascular: Negative for chest pain, palpitations and leg swelling.   Gastrointestinal: Negative for abdominal distention, abdominal pain, anal bleeding, blood in stool, constipation, diarrhea, nausea, rectal pain and vomiting.   Endocrine: Negative for cold intolerance, heat intolerance, polydipsia, polyphagia and polyuria.   Genitourinary: Positive for flank pain. Negative for decreased urine volume, difficulty urinating, dysuria, enuresis,  "frequency, genital sores, hematuria and urgency.   Musculoskeletal: Positive for back pain. Negative for arthralgias, gait problem, joint swelling, myalgias, neck pain and neck stiffness.   Skin: Negative for color change, pallor, rash and wound.   Allergic/Immunologic: Negative for environmental allergies, food allergies and immunocompromised state.   Neurological: Negative for dizziness, tremors, seizures, syncope, facial asymmetry, speech difficulty, weakness, light-headedness, numbness and headaches.   Hematological: Negative for adenopathy. Does not bruise/bleed easily.   Psychiatric/Behavioral: Negative for agitation, behavioral problems, confusion, decreased concentration, dysphoric mood, hallucinations, self-injury, sleep disturbance and suicidal ideas. The patient is not nervous/anxious and is not hyperactive.    All other systems reviewed and are negative.      The patient's past medical history, past surgical history, family history, and social history have been reviewed at length in the electronic medical record.      Physical Exam:   Temp 98 °F (36.7 °C) (Infrared)   Ht 177.8 cm (70\")   Wt 78.7 kg (173 lb 9.6 oz)   BMI 24.91 kg/m²   The patient's gait remains quite spastic.  He can ambulate independently in an awkward fashion.  There is no focal tenderness to palpation in his mid back.    Medical Decision Making    Data Review:   (All imaging studies were personally reviewed unless stated otherwise)  CT of the thoracic spine demonstrates a linear fracture through an anterior osteophyte at the T11 level.  The vertebrae itself is spared.    Diagnosis:   Modest T11 anterior osteophyte fracture.    Treatment Options:   The above-noted fracture is modest and does not require intervention.  Symptomatically he is improving.  He will follow-up with neurosurgery on an as-needed basis.       Diagnosis Plan   1. Closed wedge compression fracture of T11 vertebra, initial encounter ([facility])        2. Multilevel " degenerative disc disease        3. Neurologic gait dysfunction            Scribed for Osmani Mckeon MD by Deidra Mejia BRITNEY 12/30/2022 13:03 EST      I, Dr. Mckeon, personally performed the services described in the documentation, as scribed in my presence, and it is both accurate and complete.

## 2023-01-11 ENCOUNTER — HOSPITAL ENCOUNTER (OUTPATIENT)
Dept: MRI IMAGING | Facility: HOSPITAL | Age: 73
Discharge: HOME | End: 2023-01-11
Admitting: FAMILY MEDICINE
Payer: MEDICARE

## 2023-01-11 DIAGNOSIS — R26.9 ABNORMAL GAIT DUE TO PERIPHERAL SENSORY DISORDER: ICD-10-CM

## 2023-01-11 DIAGNOSIS — G64 ABNORMAL GAIT DUE TO PERIPHERAL SENSORY DISORDER: ICD-10-CM

## 2023-01-11 LAB — CREAT BLDA-MCNC: 1.1 MG/DL (ref 0.6–1.3)

## 2023-01-11 PROCEDURE — 0 GADOBENATE DIMEGLUMINE 529 MG/ML SOLUTION: Performed by: FAMILY MEDICINE

## 2023-01-11 PROCEDURE — 70553 MRI BRAIN STEM W/O & W/DYE: CPT

## 2023-01-11 PROCEDURE — A9577 INJ MULTIHANCE: HCPCS | Performed by: FAMILY MEDICINE

## 2023-01-11 PROCEDURE — 82565 ASSAY OF CREATININE: CPT

## 2023-01-11 RX ADMIN — GADOBENATE DIMEGLUMINE 15 ML: 529 INJECTION, SOLUTION INTRAVENOUS at 12:20

## 2023-09-02 ENCOUNTER — HOSPITAL ENCOUNTER (EMERGENCY)
Facility: HOSPITAL | Age: 73
Discharge: HOME | End: 2023-09-02
Attending: EMERGENCY MEDICINE
Payer: MEDICARE

## 2023-09-02 VITALS
RESPIRATION RATE: 16 BRPM | HEIGHT: 70 IN | TEMPERATURE: 98.1 F | HEART RATE: 70 BPM | WEIGHT: 170 LBS | OXYGEN SATURATION: 96 % | SYSTOLIC BLOOD PRESSURE: 144 MMHG | BODY MASS INDEX: 24.34 KG/M2 | DIASTOLIC BLOOD PRESSURE: 75 MMHG

## 2023-09-02 DIAGNOSIS — S01.319A: Primary | ICD-10-CM

## 2023-09-02 PROCEDURE — 99282 EMERGENCY DEPT VISIT SF MDM: CPT

## 2023-09-02 RX ORDER — TADALAFIL 5 MG/1
1 TABLET ORAL DAILY
COMMUNITY

## 2023-09-02 RX ORDER — CIPROFLOXACIN 500 MG/1
500 TABLET, FILM COATED ORAL 2 TIMES DAILY
Qty: 20 TABLET | Refills: 0 | Status: SHIPPED | OUTPATIENT
Start: 2023-09-02 | End: 2023-09-12

## 2023-09-02 RX ORDER — HYDROCODONE BITARTRATE AND ACETAMINOPHEN 5; 325 MG/1; MG/1
1 TABLET ORAL EVERY 6 HOURS PRN
Qty: 12 TABLET | Refills: 0 | Status: SHIPPED | OUTPATIENT
Start: 2023-09-02

## 2023-09-02 RX ORDER — LIDOCAINE HYDROCHLORIDE 20 MG/ML
10 INJECTION, SOLUTION INFILTRATION; PERINEURAL ONCE
Status: COMPLETED | OUTPATIENT
Start: 2023-09-02 | End: 2023-09-02

## 2023-09-02 RX ADMIN — LIDOCAINE HYDROCHLORIDE 10 ML: 20 INJECTION, SOLUTION INFILTRATION; PERINEURAL at 17:54

## 2023-09-02 NOTE — Clinical Note
[facility] EMERGENCY DEPARTMENT  [address]  [facility] [address]  Phone: [phone]    Vladimir Haq was seen and treated in our emergency department on 9/2/2023.  He may return to work on 09/05/2023.         Thank you for choosing [facility].    Tj Cunha MD

## 2023-09-02 NOTE — ED PROVIDER NOTES
Subjective   History of Present Illness  72-year-old male who presents for evaluation of right earlobe laceration after a fall.  The patient reports that he has a history of poor balance.  He uses 2 walking sticks to help maintain his balance.  He reports that he was at a art festival and he twisted and ultimately fell to the ground.  He states that he struck his ear against the table as he went to the ground.  He did not suffer any other area of injury, and does not have any other area of concern.  He does not feel like he truly struck his head and did not lose consciousness.  He does not take any anticoagulation.  He is awake and alert and consistent with his normal baseline at this given time.  No complaints of chest or abdominal pain.  No recent fever or infectious symptoms.  No complaints of neck or midline back pain.  He has been able to stand and ambulate and use his bilateral upper and lower extremities normally since the fall.  No other acute complaints.    Review of Systems   Constitutional:  Negative for chills, fatigue and fever.   HENT:  Negative for congestion, ear pain, postnasal drip, sinus pressure and sore throat.    Eyes:  Negative for pain, redness and visual disturbance.   Respiratory:  Negative for cough, chest tightness and shortness of breath.    Cardiovascular:  Negative for chest pain, palpitations and leg swelling.   Gastrointestinal:  Negative for abdominal pain, anal bleeding, blood in stool, diarrhea, nausea and vomiting.   Endocrine: Negative for polydipsia and polyuria.   Genitourinary:  Negative for difficulty urinating, dysuria, frequency and urgency.   Musculoskeletal:  Negative for arthralgias, back pain and neck pain.   Skin:  Positive for wound. Negative for pallor and rash.   Allergic/Immunologic: Negative for environmental allergies and immunocompromised state.   Neurological:  Negative for dizziness, weakness and headaches.   Hematological:  Negative for adenopathy.    Psychiatric/Behavioral:  Negative for confusion, self-injury and suicidal ideas. The patient is not nervous/anxious.    All other systems reviewed and are negative.    Past Medical History:   Diagnosis Date    Arthritis     Cancer     Basal cell skin cancer    Hyperlipidemia     Hypertension     Rosacea     Wears glasses        No Known Allergies    Past Surgical History:   Procedure Laterality Date    CERVICAL DISC SURGERY  2002    Dr. Robbins, [facility]     CERVICAL DISCECTOMY POSTERIOR FUSION WITH BRAIN LAB N/A 8/12/2021    Procedure: POSTERIOR CERVICAL FUSION VERTEX SYSTEM, LAMINECTOMIES & FUSION C3-7;  Surgeon: Osmani Mckeon MD;  Location: [facility];  Service: Neurosurgery;  Laterality: N/A;    COLONOSCOPY  2016    HIP ARTHROPLASTY Right 12/03/2018    Dr. Kimble    TONSILLECTOMY         Family History   Problem Relation Age of Onset    Cancer Mother     Stroke Father        Social History     Socioeconomic History    Marital status:    Tobacco Use    Smoking status: Never    Smokeless tobacco: Never   Vaping Use    Vaping Use: Never used   Substance and Sexual Activity    Alcohol use: Yes     Alcohol/week: 10.0 standard drinks     Types: 10 Drinks containing 0.5 oz of alcohol per week     Comment: 14 drinks per week.    Drug use: Never    Sexual activity: Defer           Objective   Physical Exam  Vitals and nursing note reviewed.   Constitutional:       General: He is not in acute distress.     Appearance: Normal appearance. He is well-developed. He is not toxic-appearing or diaphoretic.   HENT:      Head: Normocephalic and atraumatic.      Right Ear: External ear normal. Laceration present.      Left Ear: External ear normal.      Ears:        Nose: Nose normal.   Eyes:      General: Lids are normal.      Pupils: Pupils are equal, round, and reactive to light.   Neck:      Trachea: No tracheal deviation.   Cardiovascular:      Rate and Rhythm: Normal rate and regular rhythm.       Pulses: No decreased pulses.      Heart sounds: Normal heart sounds. No murmur heard.    No friction rub. No gallop.   Pulmonary:      Effort: Pulmonary effort is normal. No respiratory distress.      Breath sounds: Normal breath sounds. No decreased breath sounds, wheezing, rhonchi or rales.   Abdominal:      General: Bowel sounds are normal.      Palpations: Abdomen is soft.      Tenderness: There is no abdominal tenderness. There is no guarding or rebound.   Musculoskeletal:         General: No deformity. Normal range of motion.      Cervical back: Normal range of motion and neck supple.   Lymphadenopathy:      Cervical: No cervical adenopathy.   Skin:     General: Skin is warm and dry.      Findings: No rash.   Neurological:      Mental Status: He is alert and oriented to person, place, and time.      Cranial Nerves: No cranial nerve deficit.      Sensory: No sensory deficit.   Psychiatric:         Speech: Speech normal.         Behavior: Behavior normal.         Thought Content: Thought content normal.         Judgment: Judgment normal.       Laceration Repair    Date/Time: 9/2/2023 5:00 PM  Performed by: Tj Cunha MD  Authorized by: Tj Cunha MD     Consent:     Consent obtained:  Verbal    Consent given by:  Patient    Risks, benefits, and alternatives were discussed: yes      Risks discussed:  Infection, pain, poor cosmetic result, need for additional repair, poor wound healing and retained foreign body    Alternatives discussed:  No treatment and observation  Universal protocol:     Procedure explained and questions answered to patient or proxy's satisfaction: yes      Relevant documents present and verified: yes      Site/side marked: yes      Immediately prior to procedure, a time out was called: yes      Patient identity confirmed:  Verbally with patient and arm band  Anesthesia:     Anesthesia method:  Local infiltration    Local anesthetic:  Lidocaine 1% w/o epi  Laceration  details:     Location:  Ear    Ear location:  R ear    Length (cm):  5    Depth (mm):  5  Pre-procedure details:     Preparation:  Patient was prepped and draped in usual sterile fashion  Exploration:     Limited defect created (wound extended): no      Hemostasis achieved with:  Direct pressure    Imaging outcome: foreign body not noted      Wound exploration: wound explored through full range of motion      Wound extent comment:  Cartilage of ear transected    Contaminated: no    Treatment:     Area cleansed with:  Chlorhexidine    Amount of cleaning:  Extensive    Irrigation solution:  Sterile saline    Irrigation volume:  1000 ml    Irrigation method:  Syringe    Visualized foreign bodies/material removed: no      Debridement:  None    Undermining:  None    Scar revision: no    Skin repair:     Repair method:  Sutures    Suture size:  4-0    Suture material:  Nylon    Suture technique:  Simple interrupted    Number of sutures:  14  Approximation:     Approximation:  Close  Repair type:     Repair type:  Intermediate  Post-procedure details:     Dressing:  Open (no dressing)    Procedure completion:  Tolerated well, no immediate complications           ED Course                                   VICTORINO reviewed by Tj Cunha MD       Medical Decision Making  Differential diagnosis includes cranial hemorrhage, facial injury, ear laceration, other unspecified etiology.    Patient exhibits normal mentation does not take any anticoagulation and has been observed to maintain normal mentation throughout the ER course.  He imaging of the head and face are not deemed to be of any value.    The patient appears to have completely transected the cartilage over the helix/antihelix of the right ear.    I discussed the laceration with Dr. Menon of the ENT service who recommends cartilage removal and skin repair.    I have performed appropriate repair here in the ER and the patient will be discharged with the  advised to follow-up with Dr. Menon in clinic.    Patient was sent home with a course of antibiotics in the form of Keflex as prophylaxis against infection per Dr. Menon request.    Problems Addressed:  Complex laceration of pinna: complicated acute illness or injury    Risk  Prescription drug management.        Final diagnoses:   Complex laceration of pinna       ED Disposition  ED Disposition       ED Disposition   Discharge    Condition   Stable    Comment   --               Eliot Menon MD  [address]  [address]  [place] [address]  [phone]    Schedule an appointment as soon as possible for a visit       [facility] EMERGENCY DEPARTMENT  [address] [facility] [id number] [phone]  In 10 days  For suture removal         Medication List        New Prescriptions      ciprofloxacin 500 MG tablet  Commonly known as: CIPRO  Take 1 tablet by mouth 2 (Two) Times a Day for 10 days.     HYDROcodone-acetaminophen 5-325 MG per tablet  Commonly known as: NORCO  Take 1 tablet by mouth Every 6 (Six) Hours As Needed for Severe Pain for up to 12 doses.               Where to Get Your Medications        These medications were sent to [address] [facility] - [phone]  - [phone]   [address] [facility] [address]      Phone: [phone]   ciprofloxacin 500 MG tablet  HYDROcodone-acetaminophen 5-325 MG per tablet            Tj Cunha MD  09/03/23 [id number]

## 2023-09-02 NOTE — DISCHARGE INSTRUCTIONS
Take antibiotics as prescribed.    Take Lortab as needed for pain not controlled by Tylenol or ibuprofen.    Return to the ER with any further concern.    Follow-up with ENT or return to the ER in 10 days for suture removal.

## 2023-09-12 ENCOUNTER — HOSPITAL ENCOUNTER (EMERGENCY)
Facility: HOSPITAL | Age: 73
Discharge: HOME | End: 2023-09-12
Payer: MEDICARE

## 2023-09-12 VITALS
HEIGHT: 70 IN | OXYGEN SATURATION: 94 % | WEIGHT: 170 LBS | TEMPERATURE: 98.5 F | RESPIRATION RATE: 18 BRPM | DIASTOLIC BLOOD PRESSURE: 89 MMHG | BODY MASS INDEX: 24.34 KG/M2 | SYSTOLIC BLOOD PRESSURE: 132 MMHG | HEART RATE: 115 BPM

## 2023-09-12 PROCEDURE — 99202 OFFICE O/P NEW SF 15 MIN: CPT

## 2023-12-29 ENCOUNTER — AMBULATORY SURGICAL CENTER (OUTPATIENT)
Dept: URBAN - METROPOLITAN AREA SURGERY 10 | Facility: SURGERY | Age: 73
End: 2023-12-29

## 2023-12-29 DIAGNOSIS — K62.5 HEMORRHAGE OF ANUS AND RECTUM: ICD-10-CM

## 2023-12-29 DIAGNOSIS — K59.00 CONSTIPATION, UNSPECIFIED: ICD-10-CM

## 2023-12-29 DIAGNOSIS — K64.1 SECOND DEGREE HEMORRHOIDS: ICD-10-CM

## 2023-12-29 PROCEDURE — 45398 COLONOSCOPY W/BAND LIGATION: CPT | Performed by: INTERNAL MEDICINE

## 2024-05-03 ENCOUNTER — OFFICE VISIT (OUTPATIENT)
Dept: NEUROSURGERY | Facility: CLINIC | Age: 74
End: 2024-05-03
Payer: MEDICARE

## 2024-05-03 ENCOUNTER — HOSPITAL ENCOUNTER (OUTPATIENT)
Dept: GENERAL RADIOLOGY | Facility: HOSPITAL | Age: 74
Discharge: HOME | End: 2024-05-03
Payer: MEDICARE

## 2024-05-03 VITALS — TEMPERATURE: 97.8 F | WEIGHT: 170.9 LBS | HEIGHT: 69 IN | BODY MASS INDEX: 25.31 KG/M2

## 2024-05-03 DIAGNOSIS — R26.9 GAIT ABNORMALITY: ICD-10-CM

## 2024-05-03 DIAGNOSIS — M48.062 SPINAL STENOSIS, LUMBAR REGION, WITH NEUROGENIC CLAUDICATION: ICD-10-CM

## 2024-05-03 DIAGNOSIS — M48.062 SPINAL STENOSIS, LUMBAR REGION, WITH NEUROGENIC CLAUDICATION: Primary | ICD-10-CM

## 2024-05-03 PROCEDURE — 72120 X-RAY BEND ONLY L-S SPINE: CPT

## 2024-05-03 PROCEDURE — 1159F MED LIST DOCD IN RCRD: CPT | Performed by: NEUROLOGICAL SURGERY

## 2024-05-03 PROCEDURE — 1160F RVW MEDS BY RX/DR IN RCRD: CPT | Performed by: NEUROLOGICAL SURGERY

## 2024-05-03 PROCEDURE — 99214 OFFICE O/P EST MOD 30 MIN: CPT | Performed by: NEUROLOGICAL SURGERY

## 2024-05-03 RX ORDER — DICLOFENAC SODIUM AND MISOPROSTOL 75; 200 MG/1; UG/1
1 TABLET, DELAYED RELEASE ORAL 2 TIMES DAILY
COMMUNITY

## 2024-05-03 NOTE — PROGRESS NOTES
Patient: Vladimir Haq  : 1950    Primary Care Provider: Jim Lopez MD    Requesting Provider: As above        History    Chief Complaint: Bilateral lower extremity pain with walking and standing intolerance.    History of Present Illness: Mr. Neff is a 73-year-old gentleman is well-known to our service.  He had previously undergone ACDF by Dr. Robbins.  On 2021 I performed C3-7 decompression with fusion and stabilization for a pronounced myelopathy.  He has continued to have long track signs and spasticity.  I last saw him in  for a modest T11 anterior fracture or osteophyte fracture.  He now describes a greater than 1 month history of severe pain in both legs that occurs with standing or walking.  It goes away with sitting and lying down.  Physical therapy has not been helpful.  He has little back pain.  He has no arm symptoms.  He continues to ambulate with canes or a walker.  He takes diclofenac and baclofen.  He denies numbness or weakness.  He has no bowel or bladder dysfunction.    Review of Systems   Constitutional:  Negative for activity change, appetite change, chills, diaphoresis, fatigue, fever and unexpected weight change.   HENT:  Negative for congestion, dental problem, drooling, ear discharge, ear pain, facial swelling, hearing loss, mouth sores, nosebleeds, postnasal drip, rhinorrhea, sinus pressure, sinus pain, sneezing, sore throat, tinnitus, trouble swallowing and voice change.    Eyes:  Negative for photophobia, pain, discharge, redness, itching and visual disturbance.   Respiratory:  Negative for apnea, cough, choking, chest tightness, shortness of breath, wheezing and stridor.    Cardiovascular:  Negative for chest pain, palpitations and leg swelling.   Gastrointestinal:  Negative for abdominal distention, abdominal pain, anal bleeding, blood in stool, constipation, diarrhea, nausea, rectal pain and vomiting.   Endocrine: Negative for cold intolerance, heat  "intolerance, polydipsia, polyphagia and polyuria.   Genitourinary:  Negative for decreased urine volume, difficulty urinating, dysuria, enuresis, flank pain, frequency, genital sores, hematuria, penile discharge, penile pain, penile swelling, scrotal swelling, testicular pain and urgency.   Musculoskeletal:  Positive for back pain and gait problem. Negative for arthralgias, joint swelling, myalgias, neck pain and neck stiffness.   Skin:  Negative for color change, pallor, rash and wound.   Allergic/Immunologic: Negative for environmental allergies, food allergies and immunocompromised state.   Neurological:  Positive for weakness. Negative for dizziness, tremors, seizures, syncope, facial asymmetry, speech difficulty, light-headedness, numbness and headaches.   Hematological:  Negative for adenopathy. Does not bruise/bleed easily.   Psychiatric/Behavioral:  Negative for agitation, behavioral problems, confusion, decreased concentration, dysphoric mood, hallucinations, self-injury, sleep disturbance and suicidal ideas. The patient is not nervous/anxious and is not hyperactive.        The patient's past medical history, past surgical history, family history, and social history have been reviewed at length in the electronic medical record.      Physical Exam:   Temp 97.8 °F (36.6 °C) (Infrared)   Ht 175.3 cm (69\")   Wt 77.5 kg (170 lb 14.4 oz)   BMI 25.24 kg/m²   MUSCULOSKELETAL:  Neck tenderness to palpation is not observed.   ROM in the neck is normal.  Range of motion in his back is somewhat limited in all directions.  Straight leg raising is negative.  There is no focal tenderness to palpation in his back.  NEUROLOGICAL:  Strength is intact in the upper and lower extremities to direct testing.  Muscle tone is normal throughout.  Station and gait are fairly spastic which is his baseline.  Sensation is intact to light touch testing throughout.  Deep tendon reflexes are 3+ and symmetrical In the ankles were " reflexes are 1+..  Alcira's Sign is negative bilaterally.       Medical Decision Making    Data Review:   (All imaging studies were personally reviewed unless stated otherwise)  MRI of the lumbar spine dated 4/13/2024 demonstrates degenerative disc disease and facet arthropathy.  There is a low-grade listhesis of L4 and L5.  The radiologist commented on a low-grade listhesis of L3 on L4 but I am not as impressed with that.  There is a large disc herniation with facet and ligamentous overgrowth at L3-4 that produces pronounced stenosis.  There is generous stenosis at L4-5 as well.    Diagnosis:   1.  Lumbar stenosis with neurogenic claudication.  2.  Lumbar spondylolisthesis.    Treatment Options:   I have referred the patient for flexion-extension upright lateral lumbar spine x-rays to assess for instability.  If he is not unstable then I would pursue decompressive laminectomies at L3-4 and L4-5 with discectomy at the L3-4 level.  I explained the nature of the surgery as well as the potential risks, complications, and limitations.  Should there be evidence of alannah instability then we will need to consider fusion and stabilization in addition to the decompression.  If fusion is necessary then I will need to have that conversation with him.  I briefly touched on that.          I, Dr. Mckeon, personally performed the services described in the documentation, as scribed in my presence, and it is both accurate and complete.

## 2024-05-07 ENCOUNTER — TELEPHONE (OUTPATIENT)
Dept: NEUROSURGERY | Facility: CLINIC | Age: 74
End: 2024-05-07
Payer: MEDICARE

## 2024-05-07 NOTE — TELEPHONE ENCOUNTER
Xrays are stable. Plan is to proceed with laminectomies as Dr Mckeon had discussed with patient in the office.

## 2024-05-07 NOTE — TELEPHONE ENCOUNTER
Provider:  Mike  Surgery/Procedure:  Posterior cervical fusion vertex system, laminectomies and fusion C3-7  Surgery/Procedure Date:  8/12/21  Last visit:   5/3/24  Next visit: NA     Reason for call:  Patient completed lumbar xray and called for results: XR Spine Lumbar Flex & Ext (05/03/2024 15:30). Surgery planning may change depending on stability.

## 2024-05-10 ENCOUNTER — PREP FOR SURGERY (OUTPATIENT)
Dept: OTHER | Facility: HOSPITAL | Age: 74
End: 2024-05-10
Payer: MEDICARE

## 2024-05-10 DIAGNOSIS — M48.062 LUMBAR STENOSIS WITH NEUROGENIC CLAUDICATION: Primary | ICD-10-CM

## 2024-05-10 RX ORDER — FAMOTIDINE 20 MG/1
20 TABLET, FILM COATED ORAL
OUTPATIENT
Start: 2024-05-10

## 2024-05-10 RX ORDER — OXYCODONE HCL 10 MG/1
10 TABLET, FILM COATED, EXTENDED RELEASE ORAL ONCE
OUTPATIENT
Start: 2024-05-10 | End: 2024-05-10

## 2024-05-10 RX ORDER — ACETAMINOPHEN 500 MG
1000 TABLET ORAL ONCE
OUTPATIENT
Start: 2024-05-10 | End: 2024-05-10

## 2024-05-10 RX ORDER — CHLORHEXIDINE GLUCONATE 40 MG/ML
SOLUTION TOPICAL
Qty: 120 ML | Refills: 0 | Status: SHIPPED | OUTPATIENT
Start: 2024-05-10

## 2024-05-10 RX ORDER — IBUPROFEN 800 MG/1
800 TABLET ORAL ONCE
OUTPATIENT
Start: 2024-05-10 | End: 2024-05-10

## 2024-06-17 ENCOUNTER — HOSPITAL ENCOUNTER (EMERGENCY)
Facility: HOSPITAL | Age: 74
Discharge: HOME | End: 2024-06-17
Attending: EMERGENCY MEDICINE | Admitting: OPHTHALMOLOGY
Payer: MEDICARE

## 2024-06-17 ENCOUNTER — PRE-ADMISSION TESTING (OUTPATIENT)
Dept: PREADMISSION TESTING | Facility: HOSPITAL | Age: 74
End: 2024-06-17
Payer: MEDICARE

## 2024-06-17 ENCOUNTER — APPOINTMENT (OUTPATIENT)
Dept: GENERAL RADIOLOGY | Facility: HOSPITAL | Age: 74
End: 2024-06-17
Payer: MEDICARE

## 2024-06-17 VITALS
BODY MASS INDEX: 25.18 KG/M2 | RESPIRATION RATE: 18 BRPM | SYSTOLIC BLOOD PRESSURE: 134 MMHG | TEMPERATURE: 97.8 F | WEIGHT: 170 LBS | DIASTOLIC BLOOD PRESSURE: 85 MMHG | HEIGHT: 69 IN | OXYGEN SATURATION: 98 % | HEART RATE: 77 BPM

## 2024-06-17 VITALS — BODY MASS INDEX: 24.4 KG/M2 | WEIGHT: 170.42 LBS | HEIGHT: 70 IN

## 2024-06-17 DIAGNOSIS — S51.011A ELBOW LACERATION, RIGHT, INITIAL ENCOUNTER: Primary | ICD-10-CM

## 2024-06-17 DIAGNOSIS — M48.062 LUMBAR STENOSIS WITH NEUROGENIC CLAUDICATION: ICD-10-CM
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
## 2024-06-17 LAB
DEPRECATED RDW RBC AUTO: 45 FL (ref 37–54)
ERYTHROCYTE [DISTWIDTH] IN BLOOD BY AUTOMATED COUNT: 13.8 % (ref 12.3–15.4)
HCT VFR BLD AUTO: 41.8 % (ref 37.5–51)
HGB BLD-MCNC: 14 G/DL (ref 13–17.7)
MCH RBC QN AUTO: 29.5 PG (ref 26.6–33)
MCHC RBC AUTO-ENTMCNC: 33.5 G/DL (ref 31.5–35.7)
MCV RBC AUTO: 88.2 FL (ref 79–97)
PLATELET # BLD AUTO: 245 10*3/MM3 (ref 140–450)
PMV BLD AUTO: 9.7 FL (ref 6–12)
POTASSIUM SERPL-SCNC: 3.8 MMOL/L (ref 3.5–5.2)
RBC # BLD AUTO: 4.74 10*6/MM3 (ref 4.14–5.8)
WBC NRBC COR # BLD AUTO: 5.33 10*3/MM3 (ref 3.4–10.8)

## 2024-06-17 PROCEDURE — 36415 COLL VENOUS BLD VENIPUNCTURE: CPT

## 2024-06-17 PROCEDURE — 73070 X-RAY EXAM OF ELBOW: CPT

## 2024-06-17 PROCEDURE — 87081 CULTURE SCREEN ONLY: CPT

## 2024-06-17 PROCEDURE — 85027 COMPLETE CBC AUTOMATED: CPT

## 2024-06-17 PROCEDURE — 84132 ASSAY OF SERUM POTASSIUM: CPT

## 2024-06-17 PROCEDURE — 99283 EMERGENCY DEPT VISIT LOW MDM: CPT

## 2024-06-17 PROCEDURE — 93005 ELECTROCARDIOGRAM TRACING: CPT

## 2024-06-17 RX ORDER — ACETAMINOPHEN 500 MG
1000 TABLET ORAL EVERY 6 HOURS PRN
Status: ON HOLD | COMMUNITY

## 2024-06-17 RX ORDER — METRONIDAZOLE 10 MG/G
1 GEL TOPICAL DAILY
Status: ON HOLD | COMMUNITY

## 2024-06-17 RX ORDER — LIDOCAINE HYDROCHLORIDE AND EPINEPHRINE 10; 10 MG/ML; UG/ML
20 INJECTION, SOLUTION INFILTRATION; PERINEURAL ONCE
Status: COMPLETED | OUTPATIENT
Start: 2024-06-17 | End: 2024-06-17

## 2024-06-17 RX ORDER — SENNOSIDES 8.6 MG
650 CAPSULE ORAL EVERY 8 HOURS PRN
Status: ON HOLD | COMMUNITY

## 2024-06-17 RX ADMIN — LIDOCAINE HYDROCHLORIDE AND EPINEPHRINE 20 ML: 10; 10 INJECTION, SOLUTION INFILTRATION; PERINEURAL at 12:29

## 2024-06-17 NOTE — ED PROVIDER NOTES
Subjective   History of Present Illness  Pt is a 72 yo male presenting to ED with complaints of elbow injury after a fall. PMHx significant for HTN, HLD, Skin cancer, CBP. PMHx significant for HTN, HLD, Skin cancer, CBP and Balance issues. Pt tripped walking to car this morning and has chronic balance issues. He bumped his right elbow causing a laceration. He bandaged up at home PTA. He went to pre admission testing for back surgery next week and they sent him to ED for sutures. Tdap UTD. He denies hitting his head or LOC. He denies new neck or back pain. He denies elbow pain or difficulty with movement.     History provided by:  Patient, medical records and significant other      Review of Systems   Respiratory:  Negative for shortness of breath.    Cardiovascular:  Negative for chest pain.   Musculoskeletal:  Positive for arthralgias. Negative for back pain, joint swelling and neck pain.   Skin:  Positive for wound.   Neurological:  Negative for dizziness, weakness, numbness and headaches.       Past Medical History:   Diagnosis Date    Balance problem     since neck surgery in 2021    Cancer     Basal cell skin cancer    History of COVID-19 05/24/2024    took Paxlovid; no hospitalization required    History of recent fall 06/17/2024    lost balance and fell in garage when getting [facility]; large cut to left elbow    Hyperlipidemia     Hypertension     Pre-diabetes     Rosacea     Wears glasses        No Known Allergies    Past Surgical History:   Procedure Laterality Date    CERVICAL DISC SURGERY  2002    Dr. Robbins, [facility]     CERVICAL DISCECTOMY POSTERIOR FUSION WITH BRAIN LAB N/A 8/12/2021    Procedure: POSTERIOR CERVICAL FUSION VERTEX SYSTEM, LAMINECTOMIES & FUSION C3-7;  Surgeon: Osmani Mckeon MD;  Location: [facility];  Service: Neurosurgery;  Laterality: N/A;    COLONOSCOPY  2016    HIP ARTHROPLASTY Right 12/03/2018    Dr. Kimble     TONSILLECTOMY         Family History   Problem Relation Age of Onset    Cancer Mother     Stroke Father        Social History     Socioeconomic History    Marital status:    Tobacco Use    Smoking status: Never     Passive exposure: Never    Smokeless tobacco: Never   Vaping Use    Vaping status: Never Used   Substance and Sexual Activity    Alcohol use: Not Currently     Alcohol/week: 7.0 standard drinks of alcohol     Types: 7 Cans of beer per week     Comment: 7 drinks per day    Drug use: Never    Sexual activity: Defer           Objective   Physical Exam  Vitals and nursing note reviewed.   Constitutional:       General: He is not in acute distress.  HENT:      Head: Atraumatic.   Eyes:      Extraocular Movements: Extraocular movements intact.      Conjunctiva/sclera: Conjunctivae normal.   Cardiovascular:      Rate and Rhythm: Normal rate.      Pulses: Normal pulses.   Pulmonary:      Effort: Pulmonary effort is normal. No respiratory distress.   Musculoskeletal:         General: Normal range of motion.      Right elbow: Laceration present. No swelling or deformity. Normal range of motion. No tenderness.      Cervical back: Normal range of motion and neck supple. No tenderness. Normal range of motion.      Thoracic back: No tenderness. Normal range of motion.      Lumbar back: No tenderness. Normal range of motion.   Skin:     General: Skin is warm.   Neurological:      General: No focal deficit present.      Mental Status: He is alert.   Psychiatric:         Mood and Affect: Mood normal.         Behavior: Behavior normal.         Laceration Repair    Date/Time: 6/17/2024 9:03 PM    Performed by: Clari Davila PA  Authorized by: Andrew Henderson MD    Consent:     Consent obtained:  Verbal    Consent given by:  Patient    Risks discussed:  Infection, pain, retained foreign body, tendon damage, poor cosmetic result and poor wound healing    Alternatives discussed:  No treatment  Universal  protocol:     Imaging studies available: yes      Patient identity confirmed:  Verbally with patient  Anesthesia:     Anesthesia method:  Local infiltration    Local anesthetic:  Lidocaine 1% WITH epi  Laceration details:     Location:  Shoulder/arm    Shoulder/arm location:  R elbow    Length (cm):  5  Pre-procedure details:     Preparation:  Patient was prepped and draped in usual sterile fashion  Exploration:     Contaminated: no    Treatment:     Area cleansed with:  Saline and chlorhexidine    Amount of cleaning:  Standard    Irrigation solution:  Sterile saline  Skin repair:     Repair method:  Sutures    Suture material:  Chromic gut    Suture technique:  Simple interrupted    Number of sutures:  6  Approximation:     Approximation:  Close  Repair type:     Repair type:  Simple  Post-procedure details:     Dressing:  Non-adherent dressing    Procedure completion:  Tolerated well, no immediate complications             ED Course      Recent Results (from the past 24 hour(s))   Potassium    Collection Time: 06/17/24  9:49 AM    Specimen: Blood   Result Value Ref Range    Potassium 3.8 3.5 - 5.2 mmol/L   CBC (No Diff)    Collection Time: 06/17/24  9:49 AM    Specimen: Blood   Result Value Ref Range    WBC 5.33 3.40 - 10.80 10*3/mm3    RBC 4.74 4.14 - 5.80 10*6/mm3    Hemoglobin 14.0 13.0 - 17.7 g/dL    Hematocrit 41.8 37.5 - 51.0 %    MCV 88.2 79.0 - 97.0 fL    MCH 29.5 26.6 - 33.0 pg    MCHC 33.5 31.5 - 35.7 g/dL    RDW 13.8 12.3 - 15.4 %    RDW-SD 45.0 37.0 - 54.0 fl    MPV 9.7 6.0 - 12.0 fL    Platelets 245 140 - 450 10*3/mm3   ECG 12 Lead    Collection Time: 06/17/24 10:17 AM   Result Value Ref Range    QT Interval 390 ms    QTC Interval 429 ms     Note: In addition to lab results from this visit, the labs listed above may include labs taken at another facility or during a different encounter within the last 24 hours. Please correlate lab times with ED admission and discharge times for further clarification  "of the services performed during this visit.    XR Elbow 2 View Right   Final Result   Impression:   No evidence of fracture or radiopaque foreign body.         Electronically Signed: Cordell Brandon MD     6/17/2024 11:56 AM EDT     Workstation ID: [id number]        Vitals:    06/17/24 1053   BP: 134/85   BP Location: Left arm   Patient Position: Sitting   Pulse: 77   Resp: 18   Temp: 97.8 °F (36.6 °C)   TempSrc: Oral   SpO2: 98%   Weight: 77.1 kg (170 lb)   Height: 175.3 cm (69\")     Medications   lidocaine 1% - EPINEPHrine 1:551923 (XYLOCAINE W/EPI) 1 %-1:925756 injection 20 mL (20 mL Injection Given by Other 6/17/24 1229)     ECG/EMG Results (last 24 hours)       ** No results found for the last 24 hours. **          No orders to display       DISCHARGE    Patient discharged in stable condition.    Reviewed implications of results, diagnosis, meds, responsibility to follow up, warning signs and symptoms of possible worsening, potential complications and reasons to return to ER.    Patient/Family voiced understanding of above instructions.    Discussed plan for discharge, as there is no emergent indication for admission.  Pt/family is agreeable and understands need for follow up and possible repeat testing.  Pt/family is aware that discharge does not mean that nothing is wrong but that it indicates no emergency is currently present that requires admission and they must continue care with follow-up as given below or with a physician of their choice.     FOLLOW-UP  Jim Lopez MD  [address]  [facility] [address]  [phone]    In 1 week  or [facility], ED etc for suture removal    [facility] EMERGENCY DEPARTMENT  [address] [facility] [id number] [phone]    If symptoms worsen         Medication List      No changes were made to your prescriptions during this visit.                                              Medical Decision Making  Pt is a 74 yo male " presenting to ED with complaints of right elbow laceration after a fall. Xray without acute findings. Discussed results and tx plan. Tolerated laceration repair without difficulty. Discussed close f/u with PCP and wound care.     DDx  Laceration, Fracture, Foreign body, Skin tear, Spinal injury, Head injury    Problems Addressed:  Elbow laceration, right, initial encounter: complicated acute illness or injury  Fall, initial encounter: complicated acute illness or injury    Amount and/or Complexity of Data Reviewed  Independent Historian:      Details: Significant other  External Data Reviewed: notes.     Details: Reviewed previous non ED visits including prior labs, imaging, available notes, medications, allergies and surgical hx.     Radiology: ordered. Decision-making details documented in ED Course.    Risk  Prescription drug management.        Final diagnoses:   Elbow laceration, right, initial encounter   Fall, initial encounter       ED Disposition  ED Disposition       ED Disposition   Discharge    Condition   Stable    Comment   --               Jim Lopez MD  [address] [address]  [phone]    In 1 week  or [facility], ED etc for suture removal    [facility] EMERGENCY DEPARTMENT  [address] [facility] [address] [phone]    If symptoms worsen         Medication List      No changes were made to your prescriptions during this visit.            Clari Davila PA  06/17/24 8797

## 2024-06-17 NOTE — PAT
Patient to apply Chlorhexadine wipes  to surgical area (as instructed) the night before procedure and the AM of procedure. Wipes provided.    Per Anesthesia Request, patient instructed not to take their ACE/ARB medications on the AM of surgery.    Bactroban (if prescribed) and Chlorhexidine Prescription prescribed by physician before PAT visit.  Verified with patient that medication(s) were picked up from their pharmacy.  Written instructions given to patient during PAT visit.  Patient/family also instructed to complete skin prep checklist and return the checklist on the day of surgery to preoperative staff.  Patient/family verbalized understanding.    Patient instructed to drink 20 ounces of Gatorade or Gatorlyte (if diabetic) and it needs to be completed 1 hour (for Main OR patients) or 2 hours (scheduled  section & [facility] patients) before given arrival time for procedure (NO RED Gatorade and NO Gatorade Zero).    Patient verbalized understanding.    Patient did not review general PAT education video as instructed in their preoperative information received from their surgeon.  One-on-one Pre Admission Testing general education provided during PAT visit.  Copies of PAT general education handouts (Incentive Spirometry, Meds to Beds Program, Patient Belongings, Pre-op skin preparation instructions, Blood Glucose testing, Visitor policy, Surgery FAQ, Code H) distributed to patient. Encouraged patient/family to read PAT general education handouts thoroughly and notify PAT staff with any questions or concerns. Patient instructed to bring PAT pass and completed skin prep sheet (if applicable) on the day of procedure. Patient verbalized understanding of all information and priority content.     Patient fell in his garage when he was getting the car to come to his PAT appointment.  Patient struggles with balance issues since neck surgery in .  The only injury he suffered this morning was a laceration to right  elbow.  When I took patient back the exam room, the laceration was actively bleeding and had saturated the adhesive bandage he had applied.  There was blood on his hands from applying pressure as well as on his clothes.  When bandage was removed, a large open laceration about 2 inches in length was noted and actively bleeding.  Pressure dressing applied and patient encourage to go to the ER for evaluation to determine if stitches were necessary.  PAT visit completed.  New dressing seemed to have stopped the bleeding.  Patient transported via wheelchair accompanied by his significant other to the [facility] ER for evaluation.

## 2024-06-18 LAB — MRSA SPEC QL CULT: NORMAL

## 2024-06-19 LAB
QT INTERVAL: 390 MS
QTC INTERVAL: 429 MS

## 2024-06-23 ENCOUNTER — ANESTHESIA EVENT (OUTPATIENT)
Dept: PERIOP | Facility: HOSPITAL | Age: 74
End: 2024-06-23
Payer: MEDICARE

## 2024-06-23 RX ORDER — FAMOTIDINE 10 MG/ML
20 INJECTION, SOLUTION INTRAVENOUS ONCE
Status: CANCELLED | OUTPATIENT
Start: 2024-06-23 | End: 2024-06-23

## 2024-06-23 RX ORDER — SODIUM CHLORIDE 0.9 % (FLUSH) 0.9 %
10 SYRINGE (ML) INJECTION EVERY 12 HOURS SCHEDULED
Status: CANCELLED | OUTPATIENT
Start: 2024-06-23

## 2024-06-23 RX ORDER — SODIUM CHLORIDE 0.9 % (FLUSH) 0.9 %
10 SYRINGE (ML) INJECTION AS NEEDED
Status: CANCELLED | OUTPATIENT
Start: 2024-06-23

## 2024-06-24 ENCOUNTER — ANESTHESIA (OUTPATIENT)
Dept: PERIOP | Facility: HOSPITAL | Age: 74
End: 2024-06-24
Payer: MEDICARE

## 2024-06-24 ENCOUNTER — HOSPITAL ENCOUNTER (OUTPATIENT)
Facility: HOSPITAL | Age: 74
Discharge: HOME | End: 2024-06-27
Attending: NEUROLOGICAL SURGERY | Admitting: NEUROLOGICAL SURGERY
Payer: MEDICARE

## 2024-06-24 ENCOUNTER — APPOINTMENT (OUTPATIENT)
Dept: GENERAL RADIOLOGY | Facility: HOSPITAL | Age: 74
End: 2024-06-24
Payer: MEDICARE

## 2024-06-24 DIAGNOSIS — M48.062 LUMBAR STENOSIS WITH NEUROGENIC CLAUDICATION: ICD-10-CM

## 2024-06-24 LAB — POTASSIUM SERPL-SCNC: 3.7 MMOL/L (ref 3.5–5.2)

## 2024-06-24 PROCEDURE — 25810000003 LACTATED RINGERS PER 1000 ML: Performed by: NEUROLOGICAL SURGERY

## 2024-06-24 PROCEDURE — 25010000002 ONDANSETRON PER 1 MG: Performed by: ANESTHESIOLOGY

## 2024-06-24 PROCEDURE — 63047 LAM FACETEC & FORAMOT LUMBAR: CPT

## 2024-06-24 PROCEDURE — 63048 LAM FACETEC &FORAMOT EA ADDL: CPT | Performed by: NEUROLOGICAL SURGERY

## 2024-06-24 PROCEDURE — A9270 NON-COVERED ITEM OR SERVICE: HCPCS | Performed by: NEUROLOGICAL SURGERY

## 2024-06-24 PROCEDURE — 63710000001 TAMSULOSIN 0.4 MG CAPSULE: Performed by: NEUROLOGICAL SURGERY

## 2024-06-24 PROCEDURE — 63048 LAM FACETEC &FORAMOT EA ADDL: CPT

## 2024-06-24 PROCEDURE — 25810000003 LACTATED RINGERS PER 1000 ML: Performed by: ANESTHESIOLOGY

## 2024-06-24 PROCEDURE — 25010000002 DEXAMETHASONE PER 1 MG

## 2024-06-24 PROCEDURE — 25010000002 GLYCOPYRROLATE 1 MG/5ML SOLUTION: Performed by: ANESTHESIOLOGY

## 2024-06-24 PROCEDURE — 76000 FLUOROSCOPY <1 HR PHYS/QHP: CPT

## 2024-06-24 PROCEDURE — 25010000002 CEFAZOLIN PER 500 MG: Performed by: NEUROLOGICAL SURGERY

## 2024-06-24 PROCEDURE — 25010000002 PROPOFOL 10 MG/ML EMULSION

## 2024-06-24 PROCEDURE — 25010000002 FENTANYL CITRATE (PF) 100 MCG/2ML SOLUTION

## 2024-06-24 PROCEDURE — 63047 LAM FACETEC & FORAMOT LUMBAR: CPT | Performed by: NEUROLOGICAL SURGERY

## 2024-06-24 PROCEDURE — 63710000001 MINERAL OIL OIL 10 ML VIAL: Performed by: NEUROLOGICAL SURGERY

## 2024-06-24 PROCEDURE — 25010000002 SUGAMMADEX 500 MG/5ML SOLUTION: Performed by: ANESTHESIOLOGY

## 2024-06-24 PROCEDURE — 25810000003 SODIUM CHLORIDE PER 500 ML: Performed by: NEUROLOGICAL SURGERY

## 2024-06-24 PROCEDURE — S0260 H&P FOR SURGERY: HCPCS | Performed by: NURSE PRACTITIONER

## 2024-06-24 PROCEDURE — 84132 ASSAY OF SERUM POTASSIUM: CPT | Performed by: NEUROLOGICAL SURGERY

## 2024-06-24 PROCEDURE — 63710000001 SENNOSIDES-DOCUSATE 8.6-50 MG TABLET: Performed by: NEUROLOGICAL SURGERY

## 2024-06-24 PROCEDURE — P9612 CATHETERIZE FOR URINE SPEC: HCPCS

## 2024-06-24 DEVICE — FLOSEAL WITH RECOTHROM - 10ML.
Type: IMPLANTABLE DEVICE | Site: SPINE LUMBAR | Status: FUNCTIONAL
Brand: FLOSEAL HEMOSTATIC MATRIX

## 2024-06-24 DEVICE — HEMOST ABS SURGIFOAM SZ100 8X12 10MM: Type: IMPLANTABLE DEVICE | Site: SPINE LUMBAR | Status: FUNCTIONAL

## 2024-06-24 RX ORDER — EPHEDRINE SULFATE 50 MG/ML
INJECTION INTRAVENOUS AS NEEDED
Status: DISCONTINUED | OUTPATIENT
Start: 2024-06-24 | End: 2024-06-24 | Stop reason: SURG

## 2024-06-24 RX ORDER — PROMETHAZINE HYDROCHLORIDE 12.5 MG/1
12.5 SUPPOSITORY RECTAL EVERY 6 HOURS PRN
Status: DISCONTINUED | OUTPATIENT
Start: 2024-06-24 | End: 2024-06-27 | Stop reason: HOSPADM

## 2024-06-24 RX ORDER — FENTANYL CITRATE 50 UG/ML
INJECTION, SOLUTION INTRAMUSCULAR; INTRAVENOUS AS NEEDED
Status: DISCONTINUED | OUTPATIENT
Start: 2024-06-24 | End: 2024-06-24 | Stop reason: SURG

## 2024-06-24 RX ORDER — BISACODYL 10 MG
10 SUPPOSITORY, RECTAL RECTAL DAILY PRN
Status: DISCONTINUED | OUTPATIENT
Start: 2024-06-24 | End: 2024-06-27 | Stop reason: HOSPADM

## 2024-06-24 RX ORDER — OXYCODONE HCL 10 MG/1
10 TABLET, FILM COATED, EXTENDED RELEASE ORAL ONCE
Status: DISCONTINUED | OUTPATIENT
Start: 2024-06-24 | End: 2024-06-24 | Stop reason: HOSPADM

## 2024-06-24 RX ORDER — LOSARTAN POTASSIUM 50 MG/1
50 TABLET ORAL EVERY MORNING
Status: DISCONTINUED | OUTPATIENT
Start: 2024-06-25 | End: 2024-06-27 | Stop reason: HOSPADM

## 2024-06-24 RX ORDER — ACETAMINOPHEN 325 MG/1
650 TABLET ORAL EVERY 4 HOURS PRN
Status: DISCONTINUED | OUTPATIENT
Start: 2024-06-24 | End: 2024-06-27 | Stop reason: HOSPADM

## 2024-06-24 RX ORDER — MORPHINE SULFATE 1 MG/ML
2 INJECTION, SOLUTION EPIDURAL; INTRATHECAL; INTRAVENOUS
Status: DISCONTINUED | OUTPATIENT
Start: 2024-06-24 | End: 2024-06-27 | Stop reason: HOSPADM

## 2024-06-24 RX ORDER — ONDANSETRON 2 MG/ML
4 INJECTION INTRAMUSCULAR; INTRAVENOUS EVERY 6 HOURS PRN
Status: DISCONTINUED | OUTPATIENT
Start: 2024-06-24 | End: 2024-06-27 | Stop reason: HOSPADM

## 2024-06-24 RX ORDER — ONDANSETRON 4 MG/1
4 TABLET, ORALLY DISINTEGRATING ORAL EVERY 6 HOURS PRN
Status: DISCONTINUED | OUTPATIENT
Start: 2024-06-24 | End: 2024-06-27 | Stop reason: HOSPADM

## 2024-06-24 RX ORDER — SODIUM CHLORIDE 0.9 % (FLUSH) 0.9 %
10 SYRINGE (ML) INJECTION AS NEEDED
Status: DISCONTINUED | OUTPATIENT
Start: 2024-06-24 | End: 2024-06-27 | Stop reason: HOSPADM

## 2024-06-24 RX ORDER — PROMETHAZINE HYDROCHLORIDE 12.5 MG/1
12.5 TABLET ORAL EVERY 6 HOURS PRN
Status: DISCONTINUED | OUTPATIENT
Start: 2024-06-24 | End: 2024-06-27 | Stop reason: HOSPADM

## 2024-06-24 RX ORDER — PROPOFOL 10 MG/ML
VIAL (ML) INTRAVENOUS AS NEEDED
Status: DISCONTINUED | OUTPATIENT
Start: 2024-06-24 | End: 2024-06-24 | Stop reason: SURG

## 2024-06-24 RX ORDER — ONDANSETRON 2 MG/ML
INJECTION INTRAMUSCULAR; INTRAVENOUS AS NEEDED
Status: DISCONTINUED | OUTPATIENT
Start: 2024-06-24 | End: 2024-06-24 | Stop reason: SURG

## 2024-06-24 RX ORDER — MAGNESIUM HYDROXIDE 1200 MG/15ML
LIQUID ORAL AS NEEDED
Status: DISCONTINUED | OUTPATIENT
Start: 2024-06-24 | End: 2024-06-24 | Stop reason: HOSPADM

## 2024-06-24 RX ORDER — HYDROMORPHONE HYDROCHLORIDE 1 MG/ML
0.5 INJECTION, SOLUTION INTRAMUSCULAR; INTRAVENOUS; SUBCUTANEOUS
Status: DISCONTINUED | OUTPATIENT
Start: 2024-06-24 | End: 2024-06-24 | Stop reason: HOSPADM

## 2024-06-24 RX ORDER — FENTANYL CITRATE 50 UG/ML
50 INJECTION, SOLUTION INTRAMUSCULAR; INTRAVENOUS
Status: DISCONTINUED | OUTPATIENT
Start: 2024-06-24 | End: 2024-06-24 | Stop reason: HOSPADM

## 2024-06-24 RX ORDER — SODIUM CHLORIDE, SODIUM LACTATE, POTASSIUM CHLORIDE, CALCIUM CHLORIDE 600; 310; 30; 20 MG/100ML; MG/100ML; MG/100ML; MG/100ML
9 INJECTION, SOLUTION INTRAVENOUS CONTINUOUS
Status: DISCONTINUED | OUTPATIENT
Start: 2024-06-24 | End: 2024-06-27 | Stop reason: HOSPADM

## 2024-06-24 RX ORDER — DEXAMETHASONE SODIUM PHOSPHATE 4 MG/ML
INJECTION, SOLUTION INTRA-ARTICULAR; INTRALESIONAL; INTRAMUSCULAR; INTRAVENOUS; SOFT TISSUE AS NEEDED
Status: DISCONTINUED | OUTPATIENT
Start: 2024-06-24 | End: 2024-06-24 | Stop reason: SURG

## 2024-06-24 RX ORDER — ROCURONIUM BROMIDE 10 MG/ML
INJECTION, SOLUTION INTRAVENOUS AS NEEDED
Status: DISCONTINUED | OUTPATIENT
Start: 2024-06-24 | End: 2024-06-24 | Stop reason: SURG

## 2024-06-24 RX ORDER — GLYCOPYRROLATE 0.2 MG/ML
INJECTION INTRAMUSCULAR; INTRAVENOUS AS NEEDED
Status: DISCONTINUED | OUTPATIENT
Start: 2024-06-24 | End: 2024-06-24 | Stop reason: SURG

## 2024-06-24 RX ORDER — IBUPROFEN 800 MG/1
800 TABLET ORAL ONCE
Status: DISCONTINUED | OUTPATIENT
Start: 2024-06-24 | End: 2024-06-24 | Stop reason: HOSPADM

## 2024-06-24 RX ORDER — PHENYLEPHRINE HCL IN 0.9% NACL 1 MG/10 ML
SYRINGE (ML) INTRAVENOUS AS NEEDED
Status: DISCONTINUED | OUTPATIENT
Start: 2024-06-24 | End: 2024-06-24 | Stop reason: SURG

## 2024-06-24 RX ORDER — OXYCODONE AND ACETAMINOPHEN 7.5; 325 MG/1; MG/1
1 TABLET ORAL EVERY 4 HOURS PRN
Status: DISCONTINUED | OUTPATIENT
Start: 2024-06-24 | End: 2024-06-27 | Stop reason: HOSPADM

## 2024-06-24 RX ORDER — SODIUM CHLORIDE 9 MG/ML
40 INJECTION, SOLUTION INTRAVENOUS AS NEEDED
Status: DISCONTINUED | OUTPATIENT
Start: 2024-06-24 | End: 2024-06-27 | Stop reason: HOSPADM

## 2024-06-24 RX ORDER — MIDAZOLAM HYDROCHLORIDE 1 MG/ML
0.5 INJECTION INTRAMUSCULAR; INTRAVENOUS
Status: DISCONTINUED | OUTPATIENT
Start: 2024-06-24 | End: 2024-06-24 | Stop reason: HOSPADM

## 2024-06-24 RX ORDER — TAMSULOSIN HYDROCHLORIDE 0.4 MG/1
0.4 CAPSULE ORAL NIGHTLY
Status: DISCONTINUED | OUTPATIENT
Start: 2024-06-24 | End: 2024-06-27 | Stop reason: HOSPADM

## 2024-06-24 RX ORDER — AMOXICILLIN 250 MG
2 CAPSULE ORAL 2 TIMES DAILY
Status: DISCONTINUED | OUTPATIENT
Start: 2024-06-24 | End: 2024-06-27 | Stop reason: HOSPADM

## 2024-06-24 RX ORDER — DROPERIDOL 2.5 MG/ML
0.62 INJECTION, SOLUTION INTRAMUSCULAR; INTRAVENOUS ONCE AS NEEDED
Status: DISCONTINUED | OUTPATIENT
Start: 2024-06-24 | End: 2024-06-24 | Stop reason: HOSPADM

## 2024-06-24 RX ORDER — LIDOCAINE HYDROCHLORIDE 10 MG/ML
0.5 INJECTION, SOLUTION EPIDURAL; INFILTRATION; INTRACAUDAL; PERINEURAL ONCE AS NEEDED
Status: DISCONTINUED | OUTPATIENT
Start: 2024-06-24 | End: 2024-06-24 | Stop reason: HOSPADM

## 2024-06-24 RX ORDER — POLYETHYLENE GLYCOL 3350 17 G/17G
17 POWDER, FOR SOLUTION ORAL DAILY PRN
Status: DISCONTINUED | OUTPATIENT
Start: 2024-06-24 | End: 2024-06-27 | Stop reason: HOSPADM

## 2024-06-24 RX ORDER — LIDOCAINE HYDROCHLORIDE 10 MG/ML
INJECTION, SOLUTION EPIDURAL; INFILTRATION; INTRACAUDAL; PERINEURAL AS NEEDED
Status: DISCONTINUED | OUTPATIENT
Start: 2024-06-24 | End: 2024-06-24 | Stop reason: SURG

## 2024-06-24 RX ORDER — SODIUM CHLORIDE 9 MG/ML
40 INJECTION, SOLUTION INTRAVENOUS AS NEEDED
Status: DISCONTINUED | OUTPATIENT
Start: 2024-06-24 | End: 2024-06-24 | Stop reason: HOSPADM

## 2024-06-24 RX ORDER — SODIUM CHLORIDE 0.9 % (FLUSH) 0.9 %
3 SYRINGE (ML) INJECTION EVERY 12 HOURS SCHEDULED
Status: DISCONTINUED | OUTPATIENT
Start: 2024-06-24 | End: 2024-06-27 | Stop reason: HOSPADM

## 2024-06-24 RX ORDER — DIPHENHYDRAMINE HCL 25 MG
25 CAPSULE ORAL NIGHTLY PRN
Status: DISCONTINUED | OUTPATIENT
Start: 2024-06-24 | End: 2024-06-27 | Stop reason: HOSPADM

## 2024-06-24 RX ORDER — HYDROCHLOROTHIAZIDE 25 MG/1
12.5 TABLET ORAL DAILY
Status: DISCONTINUED | OUTPATIENT
Start: 2024-06-25 | End: 2024-06-27 | Stop reason: HOSPADM

## 2024-06-24 RX ORDER — ACETAMINOPHEN 650 MG/1
650 SUPPOSITORY RECTAL EVERY 4 HOURS PRN
Status: DISCONTINUED | OUTPATIENT
Start: 2024-06-24 | End: 2024-06-27 | Stop reason: HOSPADM

## 2024-06-24 RX ORDER — HYDROCODONE BITARTRATE AND ACETAMINOPHEN 5; 325 MG/1; MG/1
1 TABLET ORAL EVERY 4 HOURS PRN
Status: DISCONTINUED | OUTPATIENT
Start: 2024-06-24 | End: 2024-06-27 | Stop reason: HOSPADM

## 2024-06-24 RX ORDER — SODIUM CHLORIDE 9 MG/ML
INJECTION, SOLUTION INTRAVENOUS AS NEEDED
Status: DISCONTINUED | OUTPATIENT
Start: 2024-06-24 | End: 2024-06-24 | Stop reason: HOSPADM

## 2024-06-24 RX ORDER — BISACODYL 5 MG/1
5 TABLET, DELAYED RELEASE ORAL DAILY PRN
Status: DISCONTINUED | OUTPATIENT
Start: 2024-06-24 | End: 2024-06-27 | Stop reason: HOSPADM

## 2024-06-24 RX ORDER — ACETAMINOPHEN 160 MG/5ML
650 SOLUTION ORAL EVERY 4 HOURS PRN
Status: DISCONTINUED | OUTPATIENT
Start: 2024-06-24 | End: 2024-06-27 | Stop reason: HOSPADM

## 2024-06-24 RX ORDER — HYDROCHLOROTHIAZIDE 12.5 MG/1
12.5 CAPSULE, GELATIN COATED ORAL DAILY
Status: DISCONTINUED | OUTPATIENT
Start: 2024-06-24 | End: 2024-06-24

## 2024-06-24 RX ORDER — SODIUM CHLORIDE, SODIUM LACTATE, POTASSIUM CHLORIDE, CALCIUM CHLORIDE 600; 310; 30; 20 MG/100ML; MG/100ML; MG/100ML; MG/100ML
50 INJECTION, SOLUTION INTRAVENOUS CONTINUOUS
Status: DISCONTINUED | OUTPATIENT
Start: 2024-06-24 | End: 2024-06-27 | Stop reason: HOSPADM

## 2024-06-24 RX ORDER — ATORVASTATIN CALCIUM 10 MG/1
10 TABLET, FILM COATED ORAL DAILY
Status: DISCONTINUED | OUTPATIENT
Start: 2024-06-25 | End: 2024-06-25

## 2024-06-24 RX ORDER — MINERAL OIL
OIL (ML) MISCELLANEOUS AS NEEDED
Status: DISCONTINUED | OUTPATIENT
Start: 2024-06-24 | End: 2024-06-24 | Stop reason: HOSPADM

## 2024-06-24 RX ORDER — FAMOTIDINE 20 MG/1
20 TABLET, FILM COATED ORAL ONCE
Status: DISCONTINUED | OUTPATIENT
Start: 2024-06-24 | End: 2024-06-24 | Stop reason: HOSPADM

## 2024-06-24 RX ORDER — ACETAMINOPHEN 500 MG
1000 TABLET ORAL ONCE
Status: DISCONTINUED | OUTPATIENT
Start: 2024-06-24 | End: 2024-06-24 | Stop reason: HOSPADM

## 2024-06-24 RX ORDER — BUPIVACAINE HYDROCHLORIDE AND EPINEPHRINE 2.5; 5 MG/ML; UG/ML
INJECTION, SOLUTION EPIDURAL; INFILTRATION; INTRACAUDAL; PERINEURAL AS NEEDED
Status: DISCONTINUED | OUTPATIENT
Start: 2024-06-24 | End: 2024-06-24 | Stop reason: HOSPADM

## 2024-06-24 RX ORDER — NALOXONE HCL 0.4 MG/ML
0.4 VIAL (ML) INJECTION
Status: DISCONTINUED | OUTPATIENT
Start: 2024-06-24 | End: 2024-06-27 | Stop reason: HOSPADM

## 2024-06-24 RX ADMIN — GLYCOPYRROLATE 0.4 MCG: 0.2 INJECTION INTRAMUSCULAR; INTRAVENOUS at 16:45

## 2024-06-24 RX ADMIN — PROPOFOL 150 MG: 10 INJECTION, EMULSION INTRAVENOUS at 15:59

## 2024-06-24 RX ADMIN — ROCURONIUM BROMIDE 50 MG: 10 INJECTION INTRAVENOUS at 15:59

## 2024-06-24 RX ADMIN — FENTANYL CITRATE 50 MCG: 50 INJECTION, SOLUTION INTRAMUSCULAR; INTRAVENOUS at 15:59

## 2024-06-24 RX ADMIN — SODIUM CHLORIDE 2000 MG: 900 INJECTION INTRAVENOUS at 23:45

## 2024-06-24 RX ADMIN — Medication 3 ML: at 19:55

## 2024-06-24 RX ADMIN — ONDANSETRON 4 MG: 2 INJECTION INTRAMUSCULAR; INTRAVENOUS at 17:35

## 2024-06-24 RX ADMIN — FENTANYL CITRATE 50 MCG: 50 INJECTION, SOLUTION INTRAMUSCULAR; INTRAVENOUS at 16:50

## 2024-06-24 RX ADMIN — PROPOFOL 25 MCG/KG/MIN: 10 INJECTION, EMULSION INTRAVENOUS at 16:13

## 2024-06-24 RX ADMIN — SENNOSIDES AND DOCUSATE SODIUM 2 TABLET: 50; 8.6 TABLET ORAL at 19:52

## 2024-06-24 RX ADMIN — SUGAMMADEX 200 MG: 100 INJECTION, SOLUTION INTRAVENOUS at 17:38

## 2024-06-24 RX ADMIN — SODIUM CHLORIDE, POTASSIUM CHLORIDE, SODIUM LACTATE AND CALCIUM CHLORIDE: 600; 310; 30; 20 INJECTION, SOLUTION INTRAVENOUS at 15:56

## 2024-06-24 RX ADMIN — DEXAMETHASONE SODIUM PHOSPHATE 4 MG: 4 INJECTION, SOLUTION INTRAMUSCULAR; INTRAVENOUS at 16:13

## 2024-06-24 RX ADMIN — TAMSULOSIN HYDROCHLORIDE 0.4 MG: 0.4 CAPSULE ORAL at 19:53

## 2024-06-24 RX ADMIN — Medication 100 MCG: at 16:41

## 2024-06-24 RX ADMIN — SODIUM CHLORIDE 2 G: 900 INJECTION INTRAVENOUS at 16:05

## 2024-06-24 RX ADMIN — EPHEDRINE SULFATE 10 MG: 50 INJECTION INTRAVENOUS at 16:34

## 2024-06-24 RX ADMIN — LIDOCAINE HYDROCHLORIDE 50 MG: 10 INJECTION, SOLUTION EPIDURAL; INFILTRATION; INTRACAUDAL; PERINEURAL at 15:59

## 2024-06-24 RX ADMIN — SODIUM CHLORIDE, POTASSIUM CHLORIDE, SODIUM LACTATE AND CALCIUM CHLORIDE 90 ML/HR: 600; 310; 30; 20 INJECTION, SOLUTION INTRAVENOUS at 19:55

## 2024-06-24 RX ADMIN — EPHEDRINE SULFATE 10 MG: 50 INJECTION INTRAVENOUS at 16:38

## 2024-06-24 NOTE — ANESTHESIA PROCEDURE NOTES
Airway  Urgency: elective    Date/Time: 6/24/2024 4:02 PM  Airway not difficult    General Information and Staff    Patient location during procedure: OR  CRNA/CAA: Corry Trevino CRNA    Indications and Patient Condition  Indications for airway management: airway protection    Preoxygenated: yes  MILS not maintained throughout  Mask difficulty assessment: 1 - vent by mask    Final Airway Details  Final airway type: endotracheal airway      Successful airway: ETT  Cuffed: yes   Successful intubation technique: video laryngoscopy  Facilitating devices/methods: intubating stylet  Endotracheal tube insertion site: oral  Blade: Mata  Blade size: 4  ETT size (mm): 8.0  Cormack-Lehane Classification: grade I - full view of glottis  Placement verified by: chest auscultation and capnometry   Cuff volume (mL): 7  Measured from: lips  ETT/EBT  to lips (cm): 23  Number of attempts at approach: 1  Assessment: lips, teeth, and gum same as pre-op and atraumatic intubation    Additional Comments  Mata used electively. Negative epigastric sounds, Breath sound equal bilaterally with symmetric chest rise and fall

## 2024-06-24 NOTE — H&P
Pre-Op H&P  Vladimir Haq  [id number]  1950      Chief complaint: Back pain      Subjective:  Patient is a 73 y.o.male presents for scheduled surgery by Dr. Mckeon.  He anticipates a Lumbar decompressive laminectomies at L3-4 and L4-5 with discectomy at the L3-4 level  today. He has previous ACDF. On 8/12/21 he had C3-7 decompression with fusion stabilization for pronounced myelopathy.  He reports severe pain down both his legs for the past couple months with continued walking.  Symptoms relieved with sitting or lying down.  He tried PT without benefit.  No saddle anesthesia.      Review of Systems:  Constitutional-- No fever, chills or sweats. No fatigue.  CV-- No chest pain, palpitation or syncope. +HTN, HLD  Resp-- No SOB, cough, hemoptysis  Skin--No rashes or lesions      Allergies: No Known Allergies      Home Meds:  Medications Prior to Admission   Medication Sig Dispense Refill Last Dose    acetaminophen (TYLENOL) 500 MG tablet Take 2 tablets by mouth Every 6 (Six) Hours As Needed for Mild Pain.   Past Month    acetaminophen (TYLENOL) 650 MG 8 hr tablet Take 1 tablet by mouth Every 8 (Eight) Hours As Needed for Mild Pain.   Past Month    ascorbic acid (VITAMIN C) 500 MG tablet Take 1 tablet by mouth Daily.   Past Month    Chlorhexidine Gluconate 4 % solution Shower each day with solution for 5 days beginning 5 days before surgery. 120 mL 0 Past Week    diclofenac-miSOPROStol (ARTHROTEC 75) 75-0.2 MG EC tablet Take 1 tablet by mouth 2 (Two) Times a Day.   6/24/2024    hydroCHLOROthiazide (MICROZIDE) 12.5 MG capsule Take 1 capsule by mouth Daily.   6/23/2024    losartan (COZAAR) 50 MG tablet Take 1 tablet by mouth Every Morning.   6/23/2024    lovastatin (MEVACOR) 40 MG tablet Take 1 tablet by mouth Every Night. for cholesterol   6/23/2024    metroNIDAZOLE (METROGEL) 1 % gel Apply 1 Application topically to the appropriate area as directed Daily.   Past Week    mupirocin (BACTROBAN) 2 % nasal  "ointment Apply to the inside of each nostril with a cotton swab two times daily, morning and evening, for 5 days before surgery. 10 each 0 6/23/2024    psyllium (KONSYL) 28.3 % pack pack Take 1 packet by mouth Daily.   6/23/2024    tamsulosin (FLOMAX) 0.4 MG capsule 24 hr capsule Take 1 capsule by mouth Every Night.   6/23/2024    Undecylenic Acid 25 % liquid Apply 1 dose topically Every Night.   Past Week         PMH:   Past Medical History:   Diagnosis Date    Balance problem     since neck surgery in 2021    Cancer     Basal cell skin cancer    History of COVID-19 05/24/2024    took Paxlovid; no hospitalization required    History of recent fall 06/17/2024    lost balance and fell in garage when getting into car to come to [facility] in preparation for back surgery; large cut to left elbow    Hyperlipidemia     Hypertension     Pre-diabetes     Rosacea     Wears glasses      PSH:    Past Surgical History:   Procedure Laterality Date    CERVICAL DISC SURGERY  2002    Dr. Robbins, [facility]     CERVICAL DISCECTOMY POSTERIOR FUSION WITH BRAIN LAB N/A 8/12/2021    Procedure: POSTERIOR CERVICAL FUSION VERTEX SYSTEM, LAMINECTOMIES & FUSION C3-7;  Surgeon: Osmani Mckeon MD;  Location: [facility];  Service: Neurosurgery;  Laterality: N/A;    COLONOSCOPY  2016    HIP ARTHROPLASTY Right 12/03/2018    Dr. Kimble    TONSILLECTOMY       Social History:   Tobacco:   Social History     Tobacco Use   Smoking Status Never    Passive exposure: Never   Smokeless Tobacco Never      Alcohol:     Social History     Substance and Sexual Activity   Alcohol Use Not Currently    Alcohol/week: 7.0 standard drinks of alcohol    Types: 7 Cans of beer per week    Comment: 7 drinks per day         Physical Exam:/74 (BP Location: Right arm, Patient Position: Lying)   Pulse 72   Temp 98.4 °F (36.9 °C) (Temporal)   Resp 18   Ht 175.3 cm (69\")   Wt 77.1 kg (170 lb)   SpO2 99%   BMI 25.10 kg/m²       General Appearance:    " Alert, cooperative, no distress, appears stated age   Head:    Normocephalic, without obvious abnormality, atraumatic   Lungs:     Clear to auscultation bilaterally, respirations unlabored    Heart:   Regular rate and rhythm, S1 and S2 normal    Abdomen:    Soft without tenderness   Extremities:   Extremities normal, atraumatic, no cyanosis or edema   Skin:   Skin color, texture, turgor normal, no rashes or lesions   Neurologic:   Grossly intact     Results Review:     LABS:  Lab Results   Component Value Date    WBC 5.33 06/17/2024    HGB 14.0 06/17/2024    HCT 41.8 06/17/2024    MCV 88.2 06/17/2024     06/17/2024    CREATININE 1.10 01/11/2023    K 3.8 06/17/2024       RADIOLOGY:  Study Result    Narrative & Impression   XR SPINE LUMBAR FLEX AND EXT     Date of Exam: 5/3/2024 3:15 PM EDT     Indication: r/o instability     Comparison: CT lumbar spine December 12, 2022     Technique: 2 radiographs of the lumbar spine were obtained.        Findings:  There is a slight retrolisthesis of L1 on L2 and slight anterolisthesis of L4 on L5 that seems similar between flexion and extension. There is facet arthropathy within the lumbar spine. There is some anterior lipping off the vertebra.     Impression:  1.Slight retrolisthesis of L1 on L2 and anterolisthesis of L4 on L5 without obvious dynamic listhesis  2.Facet arthropathy     I reviewed the patient's new clinical results.    Cancer Staging (if applicable)  Cancer Patient: __ yes __no __unknown; If yes, clinical stage T:__ N:__M:__, stage group or __N/A      Impression: Lumbar stenosis with neurogenic claudication      Plan: Lumbar decompressive laminectomies at L3-4 and L4-5 with discectomy at the L3-4 level       CASSIA Muro   6/24/2024   13:56 EDT

## 2024-06-24 NOTE — ANESTHESIA PREPROCEDURE EVALUATION
Anesthesia Evaluation     Patient summary reviewed and Nursing notes reviewed   no history of anesthetic complications:   NPO Solid Status: > 8 hours  NPO Liquid Status: > 2 hours           Airway   Mallampati: II  TM distance: >3 FB  Neck ROM: limited  No difficulty expected  Dental - normal exam     Pulmonary - negative pulmonary ROS and normal exam    breath sounds clear to auscultation  Cardiovascular - normal exam    ECG reviewed  Rhythm: regular  Rate: normal    (+) hypertension, hyperlipidemia      Neuro/Psych- negative ROS  GI/Hepatic/Renal/Endo - negative ROS     Musculoskeletal     (+) back pain, neck pain (s/p ACDF)  Abdominal    Substance History      OB/GYN          Other   arthritis,                 Anesthesia Plan    ASA 2     general     intravenous induction     Anesthetic plan, risks, benefits, and alternatives have been provided, discussed and informed consent has been obtained with: patient.    Plan discussed with CRNA.    CODE STATUS:

## 2024-06-24 NOTE — ANESTHESIA POSTPROCEDURE EVALUATION
Patient: Vladimir Haq    Procedure Summary       Date: 06/24/24 Room / Location:  [facility]  [facility]    Anesthesia Start: 1555 Anesthesia Stop: 1803    Procedure: Lumbar decompressive laminectomies at L3-4 and L4-5 with discectomy at the L3-4 level (Spine Lumbar) Diagnosis:       Lumbar stenosis with neurogenic claudication      (Lumbar stenosis with neurogenic claudication [M48.062])    Surgeons: Osmani Mckeon MD Provider: Lex Morris MD    Anesthesia Type: general ASA Status: 2            Anesthesia Type: general    Vitals  Vitals Value Taken Time   /80 06/24/24 1759   Temp     Pulse 108 06/24/24 1801   Resp     SpO2 99 % 06/24/24 1801   Vitals shown include unfiled device data.        Post Anesthesia Care and Evaluation    Patient location during evaluation: PACU  Patient participation: complete - patient participated  Level of consciousness: responsive to verbal stimuli  Pain management: adequate    Airway patency: patent  Anesthetic complications: No anesthetic complications  PONV Status: none  Cardiovascular status: hemodynamically stable and acceptable  Respiratory status: nonlabored ventilation, acceptable, nasal cannula and oral airway  Hydration status: acceptable

## 2024-06-24 NOTE — OP NOTE
NEUROSURGICAL OPERATIVE NOTE        PREOPERATIVE DIAGNOSIS:    Lumbar stenosis with neurogenic claudication  L3-4 disc herniation      POSTOPERATIVE DIAGNOSIS:  Same      PROCEDURE:  1.  L3-4 and L4-5 laminectomies with medial facetectomies and foraminotomies  2.  Right L3-4 discectomy      SURGEON:  Osmani Mckeon M.D.      ASSISTANT: Andria Weinberg PA-C    PAC assisted with:   Suctioning   Retraction   Tying   Suturing   Closing   Application of dressing   Skilled neurosurgery PA assistance was necessary to perform this procedure.        ANESTHESIA:  General      ESTIMATED BLOOD LOSS: 60 mL      SPECIMEN: None      DRAINS: 7 flat ZITA      COMPLICATIONS:  None      CLINICAL NOTE:  Mr. Haq is a 70-year-old gentleman with progressive back and lower extremity pain with walking and standing intolerance.  Studies demonstrate generous lumbar spinal stenosis due to facet ligamentous arthropathy as well as a large central disc protrusion at L3-4.  As such, the patient presents at this time for lumbar decompression.  The nature of the procedure as well as the potential risks, complications, limitations, and alternatives to the procedure were discussed at length with the patient and the patient has agreed to proceed with surgery.      TECHNICAL NOTE:  The patient was brought to the operating room and while on his cart general endotracheal anesthesia was achieved. He was then turned prone onto the Cloward saddle frame. Special care was ensured to protect pressure points. His low back was prepared and draped in the usual fashion. A localizing radiograph was obtained with a spinal needle in the lumbosacral midline utilizing the C-arm. Based on this a several centimeter vertical incision was fashioned. The underlying tissues were divided with cautery to provide exposure to the posterior spinal elements. Another radiograph confirmed the operative level. Leksell rongeur was then utilized to remove the spinous processes  at L3, L4 and the upper aspect of L5. Central trough was fashioned using drill and Kerrison punches. This trough was widened. The facet complexes were undercut with these instruments. The neural foramina were widely decompressed on each side at each level. Good decompression was achieved. I then worked in the gutter on the right side at L3-4. The dura was mobilized medially over a large subligamentous disc herniation that had extruded slightly above the disc space. I incised into this and removed a large amount of friable disc material. This allowed for good decompression of the thecal sac at this level. Bleeding points were filled with bone wax and Floseal. With a Valsalva maneuver at the end of the procedure there was no significant bleeding or evidence of CSF leak. The wound was washed out with a saline solution. Some thin strips of Gelfoam were left in the dorsal gutters. A #7 flat ZITA drain was brought in through a separate stab incision and left in the epidural space. The paraspinous muscle and fascia were reapproximated in interrupted fashion with #0 Vicryl suture. Then 0.25% Marcaine was instilled in the paraspinous musculature and subcutaneous tissues. The subcutaneous tissues were closed in layers with 2-0 followed by 3-0 Vicryl suture. The skin was closed in a running subcuticular fashion with 3-0 Vicryl suture. Steri-Strips and sterile dressings were applied. The patient was rolled onto his cart, extubated, and taken to recovery room in satisfactory condition.             Osmani Mckeon M.D.

## 2024-06-25 PROCEDURE — 97110 THERAPEUTIC EXERCISES: CPT

## 2024-06-25 PROCEDURE — 97161 PT EVAL LOW COMPLEX 20 MIN: CPT

## 2024-06-25 PROCEDURE — A9270 NON-COVERED ITEM OR SERVICE: HCPCS | Performed by: NEUROLOGICAL SURGERY

## 2024-06-25 PROCEDURE — 63710000001 LOSARTAN 50 MG TABLET: Performed by: NEUROLOGICAL SURGERY

## 2024-06-25 PROCEDURE — 63710000001 SENNOSIDES-DOCUSATE 8.6-50 MG TABLET: Performed by: NEUROLOGICAL SURGERY

## 2024-06-25 PROCEDURE — P9612 CATHETERIZE FOR URINE SPEC: HCPCS

## 2024-06-25 PROCEDURE — 25810000003 LACTATED RINGERS PER 1000 ML: Performed by: NEUROLOGICAL SURGERY

## 2024-06-25 PROCEDURE — 99024 POSTOP FOLLOW-UP VISIT: CPT | Performed by: NEUROLOGICAL SURGERY

## 2024-06-25 PROCEDURE — 63710000001 TAMSULOSIN 0.4 MG CAPSULE: Performed by: NEUROLOGICAL SURGERY

## 2024-06-25 PROCEDURE — 25010000002 CEFAZOLIN PER 500 MG: Performed by: NEUROLOGICAL SURGERY

## 2024-06-25 PROCEDURE — 63710000001 HYDROCHLOROTHIAZIDE 25 MG TABLET: Performed by: NEUROLOGICAL SURGERY

## 2024-06-25 PROCEDURE — 63710000001 PSYLLIUM 51.7 % PACK: Performed by: NEUROLOGICAL SURGERY

## 2024-06-25 RX ORDER — ATORVASTATIN CALCIUM 10 MG/1
10 TABLET, FILM COATED ORAL NIGHTLY
Status: DISCONTINUED | OUTPATIENT
Start: 2024-06-25 | End: 2024-06-25

## 2024-06-25 RX ORDER — LOVASTATIN 40 MG/1
40 TABLET ORAL NIGHTLY
Status: DISCONTINUED | OUTPATIENT
Start: 2024-06-25 | End: 2024-06-27 | Stop reason: HOSPADM

## 2024-06-25 RX ADMIN — LOVASTATIN 40 MG: 40 TABLET ORAL at 20:35

## 2024-06-25 RX ADMIN — SODIUM CHLORIDE 2000 MG: 900 INJECTION INTRAVENOUS at 09:01

## 2024-06-25 RX ADMIN — LOSARTAN POTASSIUM 50 MG: 50 TABLET, FILM COATED ORAL at 05:15

## 2024-06-25 RX ADMIN — SODIUM CHLORIDE, POTASSIUM CHLORIDE, SODIUM LACTATE AND CALCIUM CHLORIDE 90 ML/HR: 600; 310; 30; 20 INJECTION, SOLUTION INTRAVENOUS at 05:17

## 2024-06-25 RX ADMIN — SENNOSIDES AND DOCUSATE SODIUM 2 TABLET: 50; 8.6 TABLET ORAL at 20:35

## 2024-06-25 RX ADMIN — Medication 3 ML: at 09:02

## 2024-06-25 RX ADMIN — TAMSULOSIN HYDROCHLORIDE 0.4 MG: 0.4 CAPSULE ORAL at 20:35

## 2024-06-25 RX ADMIN — HYDROCHLOROTHIAZIDE 12.5 MG: 25 TABLET ORAL at 09:01

## 2024-06-25 RX ADMIN — SENNOSIDES AND DOCUSATE SODIUM 2 TABLET: 50; 8.6 TABLET ORAL at 09:02

## 2024-06-25 NOTE — PROGRESS NOTES
"NEUROSURGERY PROGRESS NOTE     LOS: 0 days   Patient Care Team:  Jim Lopez MD as PCP - General  Jim Lopez MD as PCP - Family Medicine  Yonny Blair MD as Referring Physician (Neurology)  Osmani Mckeon MD as Consulting Physician (Neurosurgery)    Chief Complaint: Bilateral lower extremity pain with walking and standing intolerance.    POD#: 1 Day Post-Op  Procedures:  L3-4 and L4-5 laminectomies  Right L3-4 discectomy    Interval History:   By his report he has required straight catheterization during the night.  Patient Complaints: Incisional pain and urinary retention.  Patient Denies: Lower extremity weakness or numbness.    Vital Signs: Blood pressure 137/64, pulse 64, temperature 98.4 °F (36.9 °C), temperature source Oral, resp. rate 16, height 175.3 cm (69\"), weight 76.5 kg (168 lb 10.4 oz), SpO2 96%.  Intake/Output:   Intake/Output Summary (Last 24 hours) at 6/25/2024 0555  Last data filed at 6/25/2024 0517  Gross per 24 hour   Intake 100 ml   Output 2305 ml   Net -2205 ml     Drain output: 30/175 mL.    Physical Exam:  The patient awakens easily.  He follows all commands.  Motor function is intact in his lower extremities.  Dry dressing is in place on his incision.     Assessment/Plan:  1.  Lumbar stenosis with neurogenic claudication status post decompressive laminectomies and lumbar discectomy.  2.  Urinary retention: The patient denies a prior history but I will note that he is on Flomax.  3.  History of hypertension.  4.  Disposition: Mobilize patient.  PT/OT.  Hopefully home tomorrow.    Osmani Mckeon MD  06/25/24  05:55 EDT    "

## 2024-06-25 NOTE — THERAPY EVALUATION
Patient Name: Vladimir Haq  : 1950    MRN: [id number]                              Today's Date: 2024       Admit Date: 2024    Visit Dx:     ICD-10-CM ICD-9-CM   1. Lumbar stenosis with neurogenic claudication  M48.062 724.03     Patient Active Problem List   Diagnosis    Multilevel degenerative disc disease    Cervical spondylosis with myelopathy    Neurologic gait dysfunction    Cervical spinal stenosis    Lumbar stenosis with neurogenic claudication     Past Medical History:   Diagnosis Date    Balance problem     since neck surgery in     Cancer     Basal cell skin cancer    History of COVID-19 2024    took Paxlovid; no hospitalization required    History of recent fall 2024    lost balance and fell in garage when getting into car to come to [facility] in preparation for back surgery; large cut to left elbow    Hyperlipidemia     Hypertension     Pre-diabetes     Rosacea     Wears glasses      Past Surgical History:   Procedure Laterality Date    CERVICAL DISC SURGERY      Dr. Robbins, [facility]     CERVICAL DISCECTOMY POSTERIOR FUSION WITH BRAIN LAB N/A 2021    Procedure: POSTERIOR CERVICAL FUSION VERTEX SYSTEM, LAMINECTOMIES & FUSION C3-7;  Surgeon: Osmani Mckeon MD;  Location:  [facility];  Service: Neurosurgery;  Laterality: N/A;    COLONOSCOPY  2016    HIP ARTHROPLASTY Right 2018    Dr. Kimble    LUMBAR LAMINECTOMY DISCECTOMY DECOMPRESSION N/A 2024    Procedure: Lumbar decompressive laminectomies at L3-4 and L4-5 with discectomy at the L3-4;  Surgeon: Osmani Mckeon MD;  Location:  [facility];  Service: Neurosurgery;  Laterality: N/A;    TONSILLECTOMY        General Information       Row Name 24 0938          Physical Therapy Time and Intention    Document Type evaluation  -AB     Mode of Treatment physical therapy  -AB       Row Name 24 0938          General Information    Patient Profile Reviewed yes  -AB     Prior  Level of Function independent:;gait;transfer;all household mobility;bed mobility;min assist:;ADL's  Pt endorses multiple falls in last 6 months. Uses rollator for household mobility and cane for community ambulation. Does not ambulate for prolonged distances.  -AB     Existing Precautions/Restrictions fall;spinal   no sitting except to void per Dr. Mckeon, ZITA drain  -AB     Barriers to Rehab previous functional deficit  -AB       Row Name 06/25/24 0938          Living Environment    People in Home alone;other (see comments)  SO plans to provide 24/7 assist at d/c  -AB       Row Name 06/25/24 0938          Home Main Entrance    Number of Stairs, Main Entrance none;other (see comments)  ramp being placed prior to d/c  -AB       Row Name 06/25/24 0938          Stairs Within Home, Primary    Number of Stairs, Within Home, Primary none  -AB       Row Name 06/25/24 0938          Cognition    Orientation Status (Cognition) oriented x 4  -AB       Row Name 06/25/24 0938          Safety Issues, Functional Mobility    Safety Issues Affecting Function (Mobility) insight into deficits/self-awareness;safety precaution awareness;safety precautions follow-through/compliance;sequencing abilities;awareness of need for assistance  -AB     Impairments Affecting Function (Mobility) balance;endurance/activity tolerance;pain;strength;range of motion (ROM)  -AB               User Key  (r) = Recorded By, (t) = Taken By, (c) = Cosigned By      Initials Name Provider Type    AB Aura Self, PT Physical Therapist                   Mobility       Row Name 06/25/24 0942          Bed Mobility    Bed Mobility sidelying-sit;sit-sidelying;rolling right;rolling left  -AB     Rolling Left Webb (Bed Mobility) standby assist;verbal cues  -AB     Rolling Right Webb (Bed Mobility) standby assist;verbal cues  -AB     Sidelying-Sit Webb (Bed Mobility) standby assist;verbal cues  -AB     Sit-Sidelying Webb (Bed Mobility)  standby assist;verbal cues  -AB     Assistive Device (Bed Mobility) bed rails  -AB     Comment, (Bed Mobility) Education provided on logroll technique. Pt able to demonstrate with minimal cues.  -AB       Row Name 06/25/24 0942          Transfers    Comment, (Transfers) Spinal precautions reviewed, pt verbalized understanding. At end of session he recited 3/3 precautions with no cues.  -AB       Row Name 06/25/24 0942          Sit-Stand Transfer    Sit-Stand [place] (Transfers) contact guard;1 person assist  -AB     Assistive Device (Sit-Stand Transfers) walker, front-wheeled  -AB     Comment, (Sit-Stand Transfer) Safe hand placement performed w/o cues.  -AB       Row Name 06/25/24 0942          Gait/Stairs (Locomotion)    [place] Level (Gait) contact guard;1 person assist;verbal cues  -AB     Assistive Device (Gait) walker, front-wheeled  -AB     Patient was able to Ambulate yes  -AB     Distance in Feet (Gait) 216  -AB     Deviations/Abnormal Patterns (Gait) bilateral deviations;maggie decreased;gait speed decreased;stride length decreased;base of support, narrow;festinating/shuffling  -AB     Bilateral Gait Deviations heel strike decreased  -AB     Right Sided Gait Deviations Trendelenburg sign;hip circumduction  -AB     Austin Level (Stairs) not tested  -AB     Comment, (Gait/Stairs) Pt ambulated with step through gait pattern, slowed maggie, and decreased foot clearance during swing phase bilaterally. Trendelenburg sign noted at R hip with pt reporting it is improved from baseline. Pt with somewhat staggering gait however no LOB or significant instability noted. Cues provided for improved knee extension during stance phase, upright posture, forward gaze, and improved heel strike. Gait mechanics improved with cues. Further activity limited by fatigue.  -AB               User Key  (r) = Recorded By, (t) = Taken By, (c) = Cosigned By      Initials Name Provider Type    AB Aura Self, PT  Physical Therapist                   Obj/Interventions       Row Name 06/25/24 0948          Range of Motion Comprehensive    General Range of Motion bilateral lower extremity ROM WFL  -AB       Row Name 06/25/24 0948          Strength Comprehensive (MMT)    General Manual Muscle Testing (MMT) Assessment lower extremity strength deficits identified  -AB     Comment, General Manual Muscle Testing (MMT) Assessment LLE grossly 4/5; RLE grossly 4-/5  -AB       Row Name 06/25/24 0948          Motor Skills    Therapeutic Exercise knee;hip;ankle  abdominal sets x10  -AB       Row Name 06/25/24 0948          Hip (Therapeutic Exercise)    Hip (Therapeutic Exercise) isometric exercises  -AB     Hip Isometrics (Therapeutic Exercise) bilateral;gluteal sets;10 repetitions  -AB       Row Name 06/25/24 0948          Knee (Therapeutic Exercise)    Knee (Therapeutic Exercise) isometric exercises  -AB     Knee Isometrics (Therapeutic Exercise) bilateral;quad sets;10 repetitions  -AB       Row Name 06/25/24 0948          Ankle (Therapeutic Exercise)    Ankle (Therapeutic Exercise) AROM (active range of motion)  -AB     Ankle AROM (Therapeutic Exercise) bilateral;dorsiflexion;plantarflexion;10 repetitions  -AB       Row Name 06/25/24 0948          Balance    Balance Assessment sitting static balance;sitting dynamic balance;standing static balance;standing dynamic balance  -AB     Static Sitting Balance standby assist  -AB     Dynamic Sitting Balance standby assist  -AB     Position, Sitting Balance unsupported;sitting edge of bed  -AB     Static Standing Balance contact guard  -AB     Dynamic Standing Balance contact guard;1-person assist;verbal cues  -AB     Position/Device Used, Standing Balance supported;walker, front-wheeled  -AB     Balance Interventions sitting;standing;sit to stand;supported;static;dynamic;occupation based/functional task  -AB     Comment, Balance No overt LOB or knee buckling.  -AB       Row Name 06/25/24 0948           Sensory Assessment (Somatosensory)    Sensory Assessment (Somatosensory) LE sensation intact  -AB               User Key  (r) = Recorded By, (t) = Taken By, (c) = Cosigned By      Initials Name Provider Type    AB Aura Self, PT Physical Therapist                   Goals/Plan       Row Name 06/25/24 0954          Bed Mobility Goal 1 (PT)    Activity/Assistive Device (Bed Mobility Goal 1, PT) sidelying to sit;sit to sidelying;rolling to left;rolling to right  -AB     [place] Level/Cues Needed (Bed Mobility Goal 1, PT) independent  -AB     Time Frame (Bed Mobility Goal 1, PT) short term goal (STG);5 days  -AB       Row Name 06/25/24 0954          Transfer Goal 1 (PT)    Activity/Assistive Device (Transfer Goal 1, PT) sit-to-stand/stand-to-sit;bed-to-chair/chair-to-bed;walker, rolling  -AB     [place] Level/Cues Needed (Transfer Goal 1, PT) modified independence  -AB     Time Frame (Transfer Goal 1, PT) long term goal (LTG);10 days  -AB       Row Name 06/25/24 0954          Gait Training Goal 1 (PT)    Activity/Assistive Device (Gait Training Goal 1, PT) gait (walking locomotion);assistive device use;walker, rolling  -AB     [place] Level (Gait Training Goal 1, PT) modified independence  -AB     Distance (Gait Training Goal 1, PT) 350  -AB     Time Frame (Gait Training Goal 1, PT) long term goal (LTG);10 days  -AB       Row Name 06/25/24 0954          Patient Education Goal (PT)    Activity (Patient Education Goal, PT) spinal precautions  -AB     [place]/Cues/Accuracy (Memory Goal 2, PT) independent  -AB     Time Frame (Patient Education Goal, PT) long term goal (LTG);10 days  -AB       Row Name 06/25/24 0954          Therapy Assessment/Plan (PT)    Planned Therapy Interventions (PT) bed mobility training;gait training;balance training;home exercise program;postural re-education;patient/family education;transfer training;stretching;strengthening;ROM (range of motion)  -AB                User Key  (r) = Recorded By, (t) = Taken By, (c) = Cosigned By      Initials Name Provider Type    AB Aura Self, PT Physical Therapist                   Clinical Impression       Row Name 06/25/24 0950          Pain    Pretreatment Pain Rating 3/10  -AB     Posttreatment Pain Rating 4/10  -AB     Pain Location incisional  -AB     Pain Location - back  -AB     Pre/Posttreatment Pain Comment tolerated.  -AB     Pain Intervention(s) Ambulation/increased activity;Repositioned  -AB     Additional Documentation Pain Scale: Numbers Pre/Post-Treatment (Group)  -AB       Row Name 06/25/24 0950          Plan of Care Review    Plan of Care Reviewed With patient;significant other  -AB     Progress no change  -AB     Outcome Evaluation PT initial eval completed. Pt presents below his functional baseline with impaired endurance, decreased strength, and limitations d/t precautions. Spinal precautions reviewed, pt verbalized understanding. Pt ambulated 216' with CGA and RW. No LOB or knee buckling. Gait mechanics improved with cues. HEP performed. Further IPPT is warrented. PT rec d/c home with 24/7 assist and OPPT for addressing gait  deficits.  -AB       Row Name 06/25/24 0950          Therapy Assessment/Plan (PT)    Patient/Family Therapy Goals Statement (PT) Go home  -AB     Rehab Potential (PT) good, to achieve stated therapy goals  -AB     Criteria for Skilled Interventions Met (PT) yes;meets criteria;skilled treatment is necessary  -AB     Therapy Frequency (PT) daily  -AB       Row Name 06/25/24 0950          Vital Signs    Pre Systolic BP Rehab 103  -AB     Pre Treatment Diastolic BP 60  -AB     Post Systolic BP Rehab 112  -AB     Post Treatment Diastolic BP 63  -AB     Pre SpO2 (%) 99  -AB     O2 Delivery Pre Treatment room air  -AB     O2 Delivery Intra Treatment room air  -AB     Post SpO2 (%) 100  -AB     O2 Delivery Post Treatment room air  -AB     Pre Patient Position Supine  -AB     Intra Patient  Position Standing  -AB     Post Patient Position Supine  -AB       Row Name 06/25/24 0950          Positioning and Restraints    Pre-Treatment Position in bed  -AB     Post Treatment Position bed  -AB     In Bed notified nsg;supine;call light within reach;encouraged to call for assist;exit alarm on;SCD pump applied;with family/caregiver;with nsg  -AB               User Key  (r) = Recorded By, (t) = Taken By, (c) = Cosigned By      Initials Name Provider Type    Aura Ruth PT Physical Therapist                   Outcome Measures       Row Name 06/25/24 0955          How much help from another person do you currently need...    Turning from your back to your side while in flat bed without using bedrails? 4  -AB     Moving from lying on back to sitting on the side of a flat bed without bedrails? 3  -AB     Moving to and from a bed to a chair (including a wheelchair)? 3  -AB     Standing up from a chair using your arms (e.g., wheelchair, bedside chair)? 3  -AB     Climbing 3-5 steps with a railing? 2  -AB     To walk in hospital room? 3  -AB     AM-PAC 6 Clicks Score (PT) 18  -AB     Highest Level of Mobility Goal 6 --> Walk 10 steps or more  -AB       Row Name 06/25/24 0955          Functional Assessment    Outcome Measure Options AM-PAC 6 Clicks Basic Mobility (PT)  -AB               User Key  (r) = Recorded By, (t) = Taken By, (c) = Cosigned By      Initials Name Provider Type    Aura Ruth PT Physical Therapist                                 Physical Therapy Education       Title: PT OT SLP Therapies (Done)       Topic: Physical Therapy (Done)       Point: Mobility training (Done)       Learning Progress Summary             Patient Acceptance, E,D, VU,DU by AB at 6/25/2024 0956   Significant Other Acceptance, E,D, VU,DU by AB at 6/25/2024 0956                         Point: Home exercise program (Done)       Learning Progress Summary             Patient Acceptance, E,D, VU,DU by AB at 6/25/2024  0956   Significant Other Acceptance, E,D, VU,DU by AB at 6/25/2024 0956                         Point: Body mechanics (Done)       Learning Progress Summary             Patient Acceptance, E,D, VU,DU by AB at 6/25/2024 0956   Significant Other Acceptance, E,D, VU,DU by AB at 6/25/2024 0956                         Point: Precautions (Done)       Learning Progress Summary             Patient Acceptance, E,D, VU,DU by AB at 6/25/2024 0956   Significant Other Acceptance, E,D, VU,DU by AB at 6/25/2024 0956                                         User Key       Initials Effective Dates Name Provider Type Discipline    AB 09/22/22 -  Aura Self, PT Physical Therapist PT                  PT Recommendation and Plan  Planned Therapy Interventions (PT): bed mobility training, gait training, balance training, home exercise program, postural re-education, patient/family education, transfer training, stretching, strengthening, ROM (range of motion)  Plan of Care Reviewed With: patient, significant other  Progress: no change  Outcome Evaluation: PT initial eval completed. Pt presents below his functional baseline with impaired endurance, decreased strength, and limitations d/t precautions. Spinal precautions reviewed, pt verbalized understanding. Pt ambulated 216' with CGA and RW. No LOB or knee buckling. Gait mechanics improved with cues. HEP performed. Further IPPT is warrented. PT rec d/c home with 24/7 assist and OPPT for addressing gait  deficits.     Time Calculation:   PT Evaluation Complexity  History, PT Evaluation Complexity: 1-2 personal factors and/or comorbidities  Examination of Body Systems (PT Eval Complexity): total of 3 or more elements  Clinical Presentation (PT Evaluation Complexity): stable  Clinical Decision Making (PT Evaluation Complexity): low complexity  Overall Complexity (PT Evaluation Complexity): low complexity     PT Charges       Row Name 06/25/24 0956             Time Calculation     Start Time 0820  -AB      PT Received On 06/25/24  -AB      PT Goal Re-Cert Due Date 07/05/24  -AB         Timed Charges    20767 - PT Therapeutic Exercise Minutes 10  -AB         Untimed Charges    PT Eval/Re-eval Minutes 52  -AB         Total Minutes    Timed Charges Total Minutes 10  -AB      Untimed Charges Total Minutes 52  -AB       Total Minutes 62  -AB                User Key  (r) = Recorded By, (t) = Taken By, (c) = Cosigned By      Initials Name Provider Type    AB Aura Self, PT Physical Therapist                  Therapy Charges for Today       Code Description Service Date Service Provider Modifiers Qty    47969948737 HC PT THER PROC EA 15 MIN 6/25/2024 Aura Self, PT GP 1    92937232165 HC PT EVAL LOW COMPLEXITY 4 6/25/2024 Aura Self, PT GP 1            PT G-Codes  Outcome Measure Options: AM-PAC 6 Clicks Basic Mobility (PT)  AM-PAC 6 Clicks Score (PT): 18  PT Discharge Summary  Anticipated Discharge Disposition (PT): home with 24/7 care, home with outpatient therapy services    Aura Self PT  6/25/2024

## 2024-06-25 NOTE — PLAN OF CARE
Goal Outcome Evaluation:  Plan of Care Reviewed With: patient, significant other        Progress: no change  Outcome Evaluation: PT initial eval completed. Pt presents below his functional baseline with impaired endurance, decreased strength, and limitations d/t precautions. Spinal precautions reviewed, pt verbalized understanding. Pt ambulated 216' with CGA and RW. No LOB or knee buckling. Gait mechanics improved with cues. HEP performed. Further IPPT is warrented. PT rec d/c home with 24/7 assist and OPPT for addressing gait  deficits.      Anticipated Discharge Disposition (PT): home with 24/7 care, home with outpatient therapy services

## 2024-06-25 NOTE — PLAN OF CARE
Goal Outcome Evaluation:  Plan of Care Reviewed With: patient        Progress: improving     The patient is pleasant, cooperative, alert and oriented x4. VSS on room air. Minimal complaints of pain. Pain well controlled with repositioning. Urinary hesitancy and inability to void postoperatively led to two occurrences of intermittent straight catheterization. Ability to void has improved. Now voiding spontaneously with some hesitancy via urinal. The patient was able to ambulate 116 feet in the hallway. Lumbar spine wound incision is CDI. ZITA drain had a total output of 160 ml of bloody drainage.

## 2024-06-25 NOTE — CASE MANAGEMENT/SOCIAL WORK
Continued Stay Note  [facility]     Patient Name: Vladimir Haq  MRN: [id number]  Today's Date: 6/25/2024    Admit Date: 6/24/2024    Plan: Home   Discharge Plan       Row Name 06/25/24 1230       Plan    Plan Home    Plan Comments I spoke with patient in regards to discharge planning. He included his significant other in the discussion. He lives alone in [place], reports being independent with ADL's with access to a rolling walker, high commode, cane. His insurance is [facility] [employer]. He has advanced directives.  He denies the need for any case management needs such as DME.    Final Discharge Disposition Code 01 - home or self-care                   Discharge Codes    No documentation.                       Zoila Dejesus RN

## 2024-06-26 LAB
ANION GAP SERPL CALCULATED.3IONS-SCNC: 8 MMOL/L (ref 5–15)
BUN SERPL-MCNC: 21 MG/DL (ref 8–23)
BUN/CREAT SERPL: 25 (ref 7–25)
CALCIUM SPEC-SCNC: 8.8 MG/DL (ref 8.6–10.5)
CHLORIDE SERPL-SCNC: 104 MMOL/L (ref 98–107)
CO2 SERPL-SCNC: 29 MMOL/L (ref 22–29)
CREAT SERPL-MCNC: 0.84 MG/DL (ref 0.76–1.27)
DEPRECATED RDW RBC AUTO: 45 FL (ref 37–54)
EGFRCR SERPLBLD CKD-EPI 2021: 92.1 ML/MIN/1.73
ERYTHROCYTE [DISTWIDTH] IN BLOOD BY AUTOMATED COUNT: 14.1 % (ref 12.3–15.4)
GLUCOSE SERPL-MCNC: 90 MG/DL (ref 65–99)
HCT VFR BLD AUTO: 36.2 % (ref 37.5–51)
HGB BLD-MCNC: 12.3 G/DL (ref 13–17.7)
MCH RBC QN AUTO: 29.5 PG (ref 26.6–33)
MCHC RBC AUTO-ENTMCNC: 34 G/DL (ref 31.5–35.7)
MCV RBC AUTO: 86.8 FL (ref 79–97)
PLATELET # BLD AUTO: 205 10*3/MM3 (ref 140–450)
PMV BLD AUTO: 9.7 FL (ref 6–12)
POTASSIUM SERPL-SCNC: 3.9 MMOL/L (ref 3.5–5.2)
RBC # BLD AUTO: 4.17 10*6/MM3 (ref 4.14–5.8)
SODIUM SERPL-SCNC: 141 MMOL/L (ref 136–145)
WBC NRBC COR # BLD AUTO: 7.69 10*3/MM3 (ref 3.4–10.8)

## 2024-06-26 PROCEDURE — 80048 BASIC METABOLIC PNL TOTAL CA: CPT | Performed by: NEUROLOGICAL SURGERY

## 2024-06-26 PROCEDURE — A9270 NON-COVERED ITEM OR SERVICE: HCPCS | Performed by: NEUROLOGICAL SURGERY

## 2024-06-26 PROCEDURE — 97110 THERAPEUTIC EXERCISES: CPT

## 2024-06-26 PROCEDURE — 63710000001 LOSARTAN 50 MG TABLET: Performed by: NEUROLOGICAL SURGERY

## 2024-06-26 PROCEDURE — 63710000001 HYDROCHLOROTHIAZIDE 25 MG TABLET: Performed by: NEUROLOGICAL SURGERY

## 2024-06-26 PROCEDURE — 63710000001 TAMSULOSIN 0.4 MG CAPSULE: Performed by: NEUROLOGICAL SURGERY

## 2024-06-26 PROCEDURE — 85027 COMPLETE CBC AUTOMATED: CPT | Performed by: NEUROLOGICAL SURGERY

## 2024-06-26 PROCEDURE — 99024 POSTOP FOLLOW-UP VISIT: CPT | Performed by: NEUROLOGICAL SURGERY

## 2024-06-26 PROCEDURE — 97116 GAIT TRAINING THERAPY: CPT

## 2024-06-26 PROCEDURE — 63710000001 SENNOSIDES-DOCUSATE 8.6-50 MG TABLET: Performed by: NEUROLOGICAL SURGERY

## 2024-06-26 PROCEDURE — 63710000001 PSYLLIUM 51.7 % PACK: Performed by: NEUROLOGICAL SURGERY

## 2024-06-26 RX ADMIN — LOSARTAN POTASSIUM 50 MG: 50 TABLET, FILM COATED ORAL at 05:22

## 2024-06-26 RX ADMIN — TAMSULOSIN HYDROCHLORIDE 0.4 MG: 0.4 CAPSULE ORAL at 20:16

## 2024-06-26 RX ADMIN — LOVASTATIN 40 MG: 40 TABLET ORAL at 20:20

## 2024-06-26 RX ADMIN — SENNOSIDES AND DOCUSATE SODIUM 2 TABLET: 50; 8.6 TABLET ORAL at 07:43

## 2024-06-26 RX ADMIN — AVOBENZONE, HOMOSALATE, OCTISALATE, OCTOCRYLENE, AND OXYBENZONE 1 PACKET: 29.4; 147; 49; 25.4; 58.8 LOTION TOPICAL at 18:00

## 2024-06-26 RX ADMIN — SENNOSIDES AND DOCUSATE SODIUM 2 TABLET: 50; 8.6 TABLET ORAL at 20:15

## 2024-06-26 RX ADMIN — HYDROCHLOROTHIAZIDE 12.5 MG: 25 TABLET ORAL at 07:44

## 2024-06-26 NOTE — PLAN OF CARE
Goal Outcome Evaluation:  Plan of Care Reviewed With: patient        Progress: improving       Pt is alert and oriented x4. VSS on room air. Pt has slept off and on throughout the shift. No c/o of pain. Covaderm dressing to back is dry and intact with small dried drainage noted. ZITA drain had 60mL of bloody drainage out this shift. Up with an assist x1, gait belt, and walker; ambulated 150ft in the hallway. Urinal provided and voiding frequently without difficulty. SCDs in place.

## 2024-06-26 NOTE — THERAPY TREATMENT NOTE
Patient Name: Vladimir Haq  : 1950    MRN: [id number]                              Today's Date: 2024       Admit Date: 2024    Visit Dx:     ICD-10-CM ICD-9-CM   1. Lumbar stenosis with neurogenic claudication  M48.062 724.03     Patient Active Problem List   Diagnosis    Multilevel degenerative disc disease    Cervical spondylosis with myelopathy    Neurologic gait dysfunction    Cervical spinal stenosis    Lumbar stenosis with neurogenic claudication     Past Medical History:   Diagnosis Date    Balance problem     since neck surgery in     Cancer     Basal cell skin cancer    History of COVID-19 2024    took Paxlovid; no hospitalization required    History of recent fall 2024    lost balance and fell in garage when getting into car to come to [facility] in preparation for back surgery; large cut to left elbow    Hyperlipidemia     Hypertension     Pre-diabetes     Rosacea     Wears glasses      Past Surgical History:   Procedure Laterality Date    CERVICAL DISC SURGERY      Dr. Robbins, [facility]     CERVICAL DISCECTOMY POSTERIOR FUSION WITH BRAIN LAB N/A 2021    Procedure: POSTERIOR CERVICAL FUSION VERTEX SYSTEM, LAMINECTOMIES & FUSION C3-7;  Surgeon: Osmani Mckeon MD;  Location:  [facility];  Service: Neurosurgery;  Laterality: N/A;    COLONOSCOPY  2016    HIP ARTHROPLASTY Right 2018    Dr. Kimble    LUMBAR LAMINECTOMY DISCECTOMY DECOMPRESSION N/A 2024    Procedure: Lumbar decompressive laminectomies at L3-4 and L4-5 with discectomy at the L3-4;  Surgeon: Osmani Mckeon MD;  Location:  [facility];  Service: Neurosurgery;  Laterality: N/A;    TONSILLECTOMY        General Information       Row Name 24 1145          Physical Therapy Time and Intention    Document Type therapy note (daily note)  -SS     Mode of Treatment physical therapy  -SS       Row Name 24 1145          General Information    Patient Profile Reviewed yes   -     Existing Precautions/Restrictions fall;spinal  no sitting except to void per Dr. Mckeon, ZITA drain  -     Barriers to Rehab previous functional deficit  -       Row Name 06/26/24 1145          Cognition    Orientation Status (Cognition) oriented x 4  -       Row Name 06/26/24 1145          Safety Issues, Functional Mobility    Safety Issues Affecting Function (Mobility) awareness of need for assistance;insight into deficits/self-awareness;positioning of assistive device;safety precaution awareness;safety precautions follow-through/compliance;sequencing abilities  -     Impairments Affecting Function (Mobility) balance;endurance/activity tolerance;strength;range of motion (ROM);postural/trunk control  -               User Key  (r) = Recorded By, (t) = Taken By, (c) = Cosigned By      Initials Name Provider Type     Zoila Mckeon PT Physical Therapist                   Mobility       Row Name 06/26/24 1146          Bed Mobility    Bed Mobility rolling left;rolling right;sidelying-sit;sit-sidelying  -     Rolling Left [place] (Bed Mobility) standby assist;verbal cues  -     Rolling Right [place] (Bed Mobility) standby assist;verbal cues  -     Sidelying-Sit [place] (Bed Mobility) standby assist;verbal cues  -     Sit-Sidelying [place] (Bed Mobility) standby assist;verbal cues  -     Assistive Device (Bed Mobility) bed rails  -     Comment, (Bed Mobility) VC for log roll technique  -       Row Name 06/26/24 1146          Sit-Stand Transfer    Sit-Stand [place] (Transfers) contact guard;verbal cues  -     Assistive Device (Sit-Stand Transfers) walker, front-wheeled  -     Comment, (Sit-Stand Transfer) VC for hand placement, appropriate alignment, lowering with eccentric control, breathing through activity  -       Row Name 06/26/24 1146          Gait/Stairs (Locomotion)    [place] Level (Gait) contact guard;verbal cues  -     Assistive Device (Gait)  walker, front-wheeled  -     Distance in Feet (Gait) 350  -     Deviations/Abnormal Patterns (Gait) bilateral deviations;maggie decreased;gait speed decreased;stride length decreased;base of support, narrow;festinating/shuffling  -     Bilateral Gait Deviations heel strike decreased  -     Left Sided Gait Deviations decreased knee extension  -     Right Sided Gait Deviations Trendelenburg sign;hip circumduction;decreased knee extension  -     Comment, (Gait/Stairs) Pt. ambulated with a step through gait pattern w/B knee flexion. VC for upright posture, avoiding pivoting w/turning, heel toe gait pattern w/B knee extension. Activity llimited by fatigue.  -               User Key  (r) = Recorded By, (t) = Taken By, (c) = Cosigned By      Initials Name Provider Type     Zoila Mckeon PT Physical Therapist                   Obj/Interventions       Row Name 06/26/24 1148          Motor Skills    Therapeutic Exercise shoulder;hip;knee;ankle;other (see comments)  abd bracing, bent knee fall outs x 10 each  -       Row Name 06/26/24 1148          Shoulder (Therapeutic Exercise)    Shoulder (Therapeutic Exercise) AROM (active range of motion)  -     Shoulder AROM (Therapeutic Exercise) bilateral;flexion;10 repetitions  -       Row Name 06/26/24 1148          Hip (Therapeutic Exercise)    Hip (Therapeutic Exercise) isometric exercises;AROM (active range of motion)  -     Hip AROM (Therapeutic Exercise) bilateral;external rotation;internal rotation;10 repetitions  -     Hip Isometrics (Therapeutic Exercise) bilateral;gluteal sets;10 repetitions  -       Row Name 06/26/24 1148          Knee (Therapeutic Exercise)    Knee (Therapeutic Exercise) AROM (active range of motion);isometric exercises  -     Knee AROM (Therapeutic Exercise) bilateral;heel slides;10 repetitions  -     Knee Isometrics (Therapeutic Exercise) bilateral;quad sets;10 repetitions  -       Row Name 06/26/24 1148           Ankle (Therapeutic Exercise)    Ankle (Therapeutic Exercise) AROM (active range of motion)  -     Ankle AROM (Therapeutic Exercise) bilateral;dorsiflexion;plantarflexion;10 repetitions  -       Row Name 06/26/24 1148          Balance    Balance Assessment sitting static balance;sitting dynamic balance;sit to stand dynamic balance;standing static balance;standing dynamic balance  -     Static Sitting Balance standby assist  -     Dynamic Sitting Balance standby assist  -SS     Position, Sitting Balance unsupported;sitting edge of bed  -     Sit to Stand Dynamic Balance contact guard  -     Static Standing Balance contact guard  -SS     Dynamic Standing Balance contact guard  -SS     Position/Device Used, Standing Balance supported;walker, front-wheeled  -     Balance Interventions sitting;standing;sit to stand;supported;static;dynamic  -               User Key  (r) = Recorded By, (t) = Taken By, (c) = Cosigned By      Initials Name Provider Type     Zoila Mckeon, PT Physical Therapist                   Goals/Plan    No documentation.                  Clinical Impression       Row Name 06/26/24 1149          Pain    Pretreatment Pain Rating 0/10 - no pain  -     Posttreatment Pain Rating 1/10  -     Pain Location - Side/Orientation Bilateral  -     Pain Location incisional  -     Pain Location - back  -     Pain Intervention(s) Repositioned;Ambulation/increased activity;Elevated  -     Additional Documentation Pain Scale: Numbers Pre/Post-Treatment (Group)  -       Row Name 06/26/24 1144          Plan of Care Review    Plan of Care Reviewed With patient;significant other  -     Progress improving  -     Outcome Evaluation Pt. continues to present below baseline function w/generalized weakness, balance deficits and acute spinal precautions affecting his ability to safely participate in functional mobility. He performed bed mobility (log roll), transfers and ambulated 350' w/front  wheeled walker, contact guard assist. Activity limited by fatigue. He tolerated progression in ther-ex well. Continue IPPT POC to progress as tolerated.  -       Row Name 06/26/24 1149          Therapy Assessment/Plan (PT)    Rehab Potential (PT) good, to achieve stated therapy goals  -     Criteria for Skilled Interventions Met (PT) yes;meets criteria;skilled treatment is necessary  -     Therapy Frequency (PT) daily  -       Row Name 06/26/24 1149          Vital Signs    Pre Systolic BP Rehab 135  -SS     Pre Treatment Diastolic BP 75  -SS     Post Systolic BP Rehab 124  -SS     Post Treatment Diastolic BP 71  -SS     Pretreatment Heart Rate (beats/min) 73  -SS     Posttreatment Heart Rate (beats/min) 76  -SS     Pre SpO2 (%) 95  -SS     O2 Delivery Pre Treatment room air  -SS     Post SpO2 (%) 97  -SS     O2 Delivery Post Treatment room air  -SS     Pre Patient Position Supine  -SS     Post Patient Position Supine  -SS       Row Name 06/26/24 1149          Positioning and Restraints    Pre-Treatment Position in bed  -SS     Post Treatment Position bed  -SS     In Bed notified nsg;fowlers;call light within reach;encouraged to call for assist;exit alarm on;with family/caregiver;with nsg;SCD pump applied;legs elevated  -SS               User Key  (r) = Recorded By, (t) = Taken By, (c) = Cosigned By      Initials Name Provider Type     Zoila Mckeon, PT Physical Therapist                   Outcome Measures       Row Name 06/26/24 1151 06/26/24 0744       How much help from another person do you currently need...    Turning from your back to your side while in flat bed without using bedrails? 3  -SS 4  -GS    Moving from lying on back to sitting on the side of a flat bed without bedrails? 3  -SS 4  -GS    Moving to and from a bed to a chair (including a wheelchair)? 3  -SS 3  -GS    Standing up from a chair using your arms (e.g., wheelchair, bedside chair)? 3  -SS 3  -GS    Climbing 3-5 steps with a  railing? 3  -SS 3  -GS    To walk in hospital room? 3  -SS 3  -GS    AM-PAC 6 Clicks Score (PT) 18  -SS 20  -GS    Highest Level of Mobility Goal 6 --> Walk 10 steps or more  - 6 --> Walk 10 steps or more  -      Row Name 06/26/24 1151          Functional Assessment    Outcome Measure Options AM-PAC 6 Clicks Basic Mobility (PT)  -               User Key  (r) = Recorded By, (t) = Taken By, (c) = Cosigned By      Initials Name Provider Type     Marjorie oCurtney, RN Registered Nurse    Zoila Lagunas, PT Physical Therapist                                 Physical Therapy Education       Title: PT OT SLP Therapies (Done)       Topic: Physical Therapy (Done)       Point: Mobility training (Done)       Learning Progress Summary             Patient Eager, E,H, VU,DU,NR by  at 6/26/2024 1152    Comment: Reviewed safety/technique w/bed mobility (log roll), transfers, ambulation, HEP, PT POC, spinal precautions    Acceptance, E,D, VU,DU by AB at 6/25/2024 0956   Significant Other Eager, E,H, VU,DU,NR by  at 6/26/2024 1152    Comment: Reviewed safety/technique w/bed mobility (log roll), transfers, ambulation, HEP, PT POC, spinal precautions    Acceptance, E,D, VU,DU by AB at 6/25/2024 0956                         Point: Home exercise program (Done)       Learning Progress Summary             Patient Eager, E,H, VU,DU,NR by  at 6/26/2024 1152    Comment: Reviewed safety/technique w/bed mobility (log roll), transfers, ambulation, HEP, PT POC, spinal precautions    Acceptance, E,D, VU,DU by AB at 6/25/2024 0956   Significant Other Eager, E,H, VU,DU,NR by  at 6/26/2024 1152    Comment: Reviewed safety/technique w/bed mobility (log roll), transfers, ambulation, HEP, PT POC, spinal precautions    Acceptance, E,D, VU,DU by AB at 6/25/2024 0956                         Point: Body mechanics (Done)       Learning Progress Summary             Patient Eager, E,H, VU,DU,NR by  at 6/26/2024 1152    Comment: Reviewed  safety/technique w/bed mobility (log roll), transfers, ambulation, HEP, PT POC, spinal precautions    Acceptance, E,D, VU,DU by AB at 6/25/2024 0956   Significant Other Eager, E,H, VU,DU,NR by SS at 6/26/2024 1152    Comment: Reviewed safety/technique w/bed mobility (log roll), transfers, ambulation, HEP, PT POC, spinal precautions    Acceptance, E,D, VU,DU by AB at 6/25/2024 0956                         Point: Precautions (Done)       Learning Progress Summary             Patient Eager, E,H, VU,DU,NR by SS at 6/26/2024 1152    Comment: Reviewed safety/technique w/bed mobility (log roll), transfers, ambulation, HEP, PT POC, spinal precautions    Acceptance, E,D, VU,DU by AB at 6/25/2024 0956   Significant Other Eager, E,H, VU,DU,NR by SS at 6/26/2024 1152    Comment: Reviewed safety/technique w/bed mobility (log roll), transfers, ambulation, HEP, PT POC, spinal precautions    Acceptance, E,D, VU,DU by AB at 6/25/2024 0956                                         User Key       Initials Effective Dates Name Provider Type Discipline     06/01/21 -  Zoila Mckeon, PT Physical Therapist PT    AB 09/22/22 -  Aura Self, PT Physical Therapist PT                  PT Recommendation and Plan     Plan of Care Reviewed With: patient, significant other  Progress: improving  Outcome Evaluation: Pt. continues to present below baseline function w/generalized weakness, balance deficits and acute spinal precautions affecting his ability to safely participate in functional mobility. He performed bed mobility (log roll), transfers and ambulated 350' w/front wheeled walker, contact guard assist. Activity limited by fatigue. He tolerated progression in ther-ex well. Continue IPPT POC to progress as tolerated.     Time Calculation:         PT Charges       Row Name 06/26/24 1152             Time Calculation    Start Time 1125  -SS      PT Received On 06/26/24  -         Timed Charges    13999 - PT Therapeutic Exercise Minutes 10   -      93878 - Gait Training Minutes  10  -SS      01591 - PT Therapeutic Activity Minutes 3  -SS         Total Minutes    Timed Charges Total Minutes 23  -SS       Total Minutes 23  -SS                User Key  (r) = Recorded By, (t) = Taken By, (c) = Cosigned By      Initials Name Provider Type    Zoila Lagunas, BOUBACAR Physical Therapist                  Therapy Charges for Today       Code Description Service Date Service Provider Modifiers Qty    95192530942 HC PT THER PROC EA 15 MIN 6/26/2024 Zoila Mckeon, PT GP 1    [id number] HC GAIT TRAINING EA 15 MIN 6/26/2024 Zoila Mckeon, PT GP 1            PT G-Codes  Outcome Measure Options: AM-PAC 6 Clicks Basic Mobility (PT)  AM-PAC 6 Clicks Score (PT): 18  PT Discharge Summary  Anticipated Discharge Disposition (PT): home with 24/7 care, home with outpatient therapy services    Zoila Mckeon, BOUBACAR  6/26/2024

## 2024-06-26 NOTE — CASE MANAGEMENT/SOCIAL WORK
Case Management Discharge Note      Final Note: For Thursday: To [facility], respite care, self pay with home health for rehab.   Patient tells me no one in the home to assist him. I assisted him in arrangements for self pay, respite at [facility] for tomorrow. Facility will receive him tomorrow. Facility will need a transfer summary, meds to bed and I have made referral for home health. I spoke with Ms. Lenard JIMENEZ and updated her on plans.         Selected Continued Care - Admitted Since 6/24/2024       Destination    No services have been selected for the patient.                Durable Medical Equipment    No services have been selected for the patient.                Dialysis/Infusion    No services have been selected for the patient.                Home Medical Care    No services have been selected for the patient.                Therapy    No services have been selected for the patient.                Community Resources    No services have been selected for the patient.                Community & DME    No services have been selected for the patient.                         Final Discharge Disposition Code: 06 - home with home health care

## 2024-06-26 NOTE — PLAN OF CARE
Goal Outcome Evaluation:  Plan of Care Reviewed With: patient, significant other        Progress: improving  Outcome Evaluation: Pt. continues to present below baseline function w/generalized weakness, balance deficits and acute spinal precautions affecting his ability to safely participate in functional mobility. He performed bed mobility (log roll), transfers and ambulated 350' w/front wheeled walker, contact guard assist. Activity limited by fatigue. He tolerated progression in ther-ex well. Continue IPPT POC to progress as tolerated.      Anticipated Discharge Disposition (PT): home with 24/7 care, home with outpatient therapy services

## 2024-06-26 NOTE — PLAN OF CARE
Goal Outcome Evaluation:      VSS on RA. A&Ox4. Frequently voiding spontaneously without difficulty. Surgical dressing intact with small amount of dried drainage noted. Ambulated with PT. ZITA drain had 90mL of bloody output. BLE SCDs on. Call light in reach.

## 2024-06-26 NOTE — PROGRESS NOTES
"NEUROSURGERY PROGRESS NOTE     LOS: 0 days   Patient Care Team:  Jim Lopez MD as PCP - General  Jim Lopez MD as PCP - Family Medicine  Yonny Blair MD as Referring Physician (Neurology)  Osmani Mckeon MD as Consulting Physician (Neurosurgery)    Chief Complaint: Bilateral lower extremity pain with walking and standing intolerance.    POD#: 2 Days Post-Op  Procedures:  L3-4 and L4-5 laminectomies  Right L3-4 discectomy    Interval History:   Patient Complaints: Confusion.  Patient Denies: Voiding difficulty.    Vital Signs: Blood pressure 111/67, pulse 64, temperature 98.1 °F (36.7 °C), temperature source Oral, resp. rate 16, height 175.3 cm (69\"), weight 76.5 kg (168 lb 10.4 oz), SpO2 98%.  Intake/Output:   Intake/Output Summary (Last 24 hours) at 6/26/2024 0608  Last data filed at 6/26/2024 0443  Gross per 24 hour   Intake 1200 ml   Output 4055 ml   Net -2855 ml     Drain output: 90/60 mL.    Physical Exam:  The patient awakens easily.  He is oriented to place, circumstances, month.  He follows simple commands.  He seems to have some difficulty following through on his thought processes.  His responses are a little bit slow and pondered.  He moves all extremities equally.  Dry dressing is in place on his incision.     Assessment/Plan:  1.  Lumbar stenosis with neurogenic claudication status post decompressive laminectomies and lumbar discectomy.  2.  Urinary retention: The patient denies a prior history but I will note that he is on Flomax.  3.  History of hypertension.  4.  Disposition: Otherwise patient.  I went to check some laboratory studies.  Observe.      Osmani Mckeon MD  06/26/24  06:08 EDT    "

## 2024-06-27 ENCOUNTER — DOCUMENTATION (OUTPATIENT)
Dept: HOME HEALTH SERVICES | Facility: HOME HEALTHCARE | Age: 74
End: 2024-06-27
Payer: MEDICARE

## 2024-06-27 ENCOUNTER — HOME HEALTH ADMISSION (OUTPATIENT)
Dept: HOME HEALTH SERVICES | Facility: HOME HEALTHCARE | Age: 74
End: 2024-06-27
Payer: MEDICARE

## 2024-06-27 VITALS
OXYGEN SATURATION: 97 % | DIASTOLIC BLOOD PRESSURE: 68 MMHG | RESPIRATION RATE: 16 BRPM | WEIGHT: 168.65 LBS | TEMPERATURE: 98.1 F | HEIGHT: 69 IN | HEART RATE: 68 BPM | BODY MASS INDEX: 24.98 KG/M2 | SYSTOLIC BLOOD PRESSURE: 103 MMHG

## 2024-06-27 PROCEDURE — 63710000001 SENNOSIDES-DOCUSATE 8.6-50 MG TABLET: Performed by: NEUROLOGICAL SURGERY

## 2024-06-27 PROCEDURE — 63710000001 HYDROCHLOROTHIAZIDE 25 MG TABLET: Performed by: NEUROLOGICAL SURGERY

## 2024-06-27 PROCEDURE — A9270 NON-COVERED ITEM OR SERVICE: HCPCS | Performed by: NEUROLOGICAL SURGERY

## 2024-06-27 PROCEDURE — 63710000001 POLYETHYLENE GLYCOL 17 G PACK: Performed by: NEUROLOGICAL SURGERY

## 2024-06-27 PROCEDURE — 63710000001 LOSARTAN 50 MG TABLET: Performed by: NEUROLOGICAL SURGERY

## 2024-06-27 PROCEDURE — 97116 GAIT TRAINING THERAPY: CPT

## 2024-06-27 PROCEDURE — 99024 POSTOP FOLLOW-UP VISIT: CPT

## 2024-06-27 PROCEDURE — 97110 THERAPEUTIC EXERCISES: CPT

## 2024-06-27 RX ADMIN — POLYETHYLENE GLYCOL 3350 17 G: 17 POWDER, FOR SOLUTION ORAL at 00:30

## 2024-06-27 RX ADMIN — SENNOSIDES AND DOCUSATE SODIUM 2 TABLET: 50; 8.6 TABLET ORAL at 08:38

## 2024-06-27 RX ADMIN — LOSARTAN POTASSIUM 50 MG: 50 TABLET, FILM COATED ORAL at 06:32

## 2024-06-27 RX ADMIN — HYDROCHLOROTHIAZIDE 12.5 MG: 25 TABLET ORAL at 08:38

## 2024-06-27 NOTE — PROGRESS NOTES
"NEUROSURGERY PROGRESS NOTE     LOS: 0 days   Patient Care Team:  Jim Lopez MD as PCP - General  Jim Lopez MD as PCP - Family Medicine  Yonny Blair MD as Referring Physician (Neurology)  Osmani Mckeon MD as Consulting Physician (Neurosurgery)    Chief Complaint: Bilateral lower extremity pain with walking and standing intolerance.    POD #3  Procedures: L3-4 and L4-5 laminectomies  Right L3-4 discectomy.    Interval History:   Patient Complaints: None  Patient Denies: Pain in his legs, voiding difficulty.      Vital Signs: Blood pressure 107/69, pulse 68, temperature 97.6 °F (36.4 °C), temperature source Oral, resp. rate 16, height 175.3 cm (69\"), weight 76.5 kg (168 lb 10.4 oz), SpO2 97%.  Intake/Output:   Intake/Output Summary (Last 24 hours) at 6/27/2024 0953  Last data filed at 6/27/2024 0830  Gross per 24 hour   Intake 120 ml   Output 600 ml   Net -480 ml       Physical Exam:  The patient is up and transitioning to the bathroom with a walker and with nursing assistance.    He is alert and oriented.  Confusion that was noted yesterday has largely improved; the patient's significant other at bedside states that the patient is not really himself until his first cup of coffee around 10:30 AM.    He moves all extremities equally.    Mild heme staining is noted on his dressing.    Assessment/Plan:  1.  Lumbar stenosis with neurogenic claudication status post decompressive laminectomies and lumbar discectomy.  2.  Urinary retention: Patient is on Flomax, this appears to have improved  3.  History of hypertension.  4.  Disposition: From a neurosurgical perspective the patient is ready to discharge at this time.      Andria Weinberg PA-C  06/27/24  09:53 EDT    "

## 2024-06-27 NOTE — THERAPY TREATMENT NOTE
Patient Name: Vladimir Haq  : 1950    MRN: [id number]                              Today's Date: 2024       Admit Date: 2024    Visit Dx:     ICD-10-CM ICD-9-CM   1. Lumbar stenosis with neurogenic claudication  M48.062 724.03     Patient Active Problem List   Diagnosis    Multilevel degenerative disc disease    Cervical spondylosis with myelopathy    Neurologic gait dysfunction    Cervical spinal stenosis    Lumbar stenosis with neurogenic claudication     Past Medical History:   Diagnosis Date    Balance problem     since neck surgery in     Cancer     Basal cell skin cancer    History of COVID-19 2024    took Paxlovid; no hospitalization required    History of recent fall 2024    lost balance and fell in garage when getting into car to come to [facility] in preparation for back surgery; large cut to left elbow    Hyperlipidemia     Hypertension     Pre-diabetes     Rosacea     Wears glasses      Past Surgical History:   Procedure Laterality Date    CERVICAL DISC SURGERY      Dr. Robbins, [facility]     CERVICAL DISCECTOMY POSTERIOR FUSION WITH BRAIN LAB N/A 2021    Procedure: POSTERIOR CERVICAL FUSION VERTEX SYSTEM, LAMINECTOMIES & FUSION C3-7;  Surgeon: Osmani Mckeon MD;  Location:  [facility];  Service: Neurosurgery;  Laterality: N/A;    COLONOSCOPY  2016    HIP ARTHROPLASTY Right 2018    Dr. Kimble    LUMBAR LAMINECTOMY DISCECTOMY DECOMPRESSION N/A 2024    Procedure: Lumbar decompressive laminectomies at L3-4 and L4-5 with discectomy at the L3-4;  Surgeon: Osmani Mckeon MD;  Location:  [facility];  Service: Neurosurgery;  Laterality: N/A;    TONSILLECTOMY        General Information       Row Name 24 1012          Physical Therapy Time and Intention    Document Type therapy note (daily note)  -     Mode of Treatment physical therapy  -       Row Name 24 1012          General Information    Patient Profile Reviewed yes   -     Existing Precautions/Restrictions fall;spinal  no sitting except to void per Dr. Mckeon,  -       Row Name 06/27/24 1012          Cognition    Orientation Status (Cognition) oriented x 4  -       Row Name 06/27/24 1012          Safety Issues, Functional Mobility    Impairments Affecting Function (Mobility) balance;endurance/activity tolerance;strength;range of motion (ROM);postural/trunk control  -               User Key  (r) = Recorded By, (t) = Taken By, (c) = Cosigned By      Initials Name Provider Type     Nelida Walton PT Physical Therapist                   Mobility       Row Name 06/27/24 1015          Bed Mobility    Bed Mobility sit-supine  -     Sit-Supine Berg[place] (Bed Mobility) standby assist  -     Comment, (Bed Mobility) VC for sequencing with log rolling. Difficulty repositioning in bed  -[facility] Name 06/27/24 1015          Transfers    Comment, (Transfers) VC hand placement. Posterior lean noted with initial stand; B posterior lower leg leaning against bed when coming to stand for stability.  -       Row Name 06/27/24 1015          Sit-Stand Transfer    Sit-Stand [place] (Transfers) contact guard;verbal cues  -     Assistive Device (Sit-Stand Transfers) walker, front-wheeled  -       Row Name 06/27/24 1015          Gait/Stairs (Locomotion)    [place] Level (Gait) minimum assist (75% patient effort);verbal cues;nonverbal cues (demo/gesture)  -     Assistive Device (Gait) walker, front-wheeled  -     Distance in Feet (Gait) 300  -     Deviations/Abnormal Patterns (Gait) bilateral deviations;maggie decreased;gait speed decreased;base of support, narrow;festinating/shuffling  -     Bilateral Gait Deviations heel strike decreased  -     Left Sided Gait Deviations decreased knee extension  -     Right Sided Gait Deviations decreased knee extension;foot drop/toe drag;heel strike decreased;Trendelenburg sign  -     Comment, (Gait/Stairs) Pt amb with  step-through gait pattern. B knee flexion noted. Excessive stride length noted with decreased motor control with foot placement. Quick gait speed with decreased safety awareness. Moderate unsteadiness noted though no obvert LOB. VC for decreased stride length, increased heel strike, and increased B knee extension. Improvement noted. Activity limited by balance deficits.  -               User Key  (r) = Recorded By, (t) = Taken By, (c) = Cosigned By      Initials Name Provider Type     Nelida Walton PT Physical Therapist                   Obj/Interventions       [facility] Name 06/27/24 1035          Motor Skills    Therapeutic Exercise hip;knee;ankle;shoulder  -[facility] Name 06/27/24 1035          Shoulder (Therapeutic Exercise)    Shoulder (Therapeutic Exercise) AROM (active range of motion)  -     Shoulder AROM (Therapeutic Exercise) bilateral;flexion;10 repetitions  -[facility] Name 06/27/24 1035          Hip (Therapeutic Exercise)    Hip (Therapeutic Exercise) strengthening exercise  -     Hip Strengthening (Therapeutic Exercise) bilateral;aBduction;aDduction;external rotation;internal rotation;heel slides;10 repetitions  -[facility] Name 06/27/24 1035          Knee (Therapeutic Exercise)    Knee (Therapeutic Exercise) isometric exercises  -     Knee Isometrics (Therapeutic Exercise) bilateral;gluteal sets;quad sets;other (see comments);10 repetitions  abdominal set  -[facility] Name 06/27/24 1035          Ankle (Therapeutic Exercise)    Ankle (Therapeutic Exercise) AROM (active range of motion)  -     Ankle AROM (Therapeutic Exercise) bilateral;dorsiflexion;plantarflexion;10 repetitions  -[facility] Name 06/27/24 1035          Balance    Balance Assessment sitting static balance;sitting dynamic balance;sit to stand dynamic balance;standing static balance;standing dynamic balance  -     Static Sitting Balance standby assist  -     Dynamic Sitting Balance standby assist  -     Position,  Sitting Balance sitting in chair  -     Static Standing Balance standby assist  -     Dynamic Standing Balance minimal assist  -     Position/Device Used, Standing Balance supported;walker, rolling  -     Balance Interventions sitting;standing;sit to stand;occupation based/functional task  -               User Key  (r) = Recorded By, (t) = Taken By, (c) = Cosigned By      Initials Name Provider Type     Nelida Walton PT Physical Therapist                   Goals/Plan    No documentation.                  Clinical Impression       [facility] Name 06/27/24 1036          Pain    Pretreatment Pain Rating 0/10 - no pain  -     Posttreatment Pain Rating 0/10 - no pain  -[facility] Name 06/27/24 1036          Plan of Care Review    Plan of Care Reviewed With patient;significant other  -     Progress no change  -     Outcome Evaluation Pt amb in [place] with moderate unsteadiness noted though no significant LOB observed. Deficits in balance, B LE motor control, strength, and gait quaility puts pt at high risk for recurrent falls. Plan is to d/c with respite care and HHPT. Skilled care would be beneficial for fall prevention.  -[facility] Name 06/27/24 1036          Vital Signs    Pre Patient Position Sitting  -     Intra Patient Position Standing  -     Post Patient Position Supine  -HW       [facility] Name 06/27/24 1036          Positioning and Restraints    Pre-Treatment Position bathroom  -     Post Treatment Position bed  -     In Bed notified nsg;supine;fowlers;call light within reach;encouraged to call for assist;exit alarm on;with family/caregiver;side rails up x2  -               User Key  (r) = Recorded By, (t) = Taken By, (c) = Cosigned By      Initials Name Provider Type     Nelida Walton PT Physical Therapist                   Outcome Measures       [facility] Name 06/27/24 1042 06/27/24 0838       How much help from another person do you currently need...    Turning from your back to your side while  in flat bed without using bedrails? 3  - 2  -SC    Moving from lying on back to sitting on the side of a flat bed without bedrails? 3  - 3  -SC    Moving to and from a bed to a chair (including a wheelchair)? 3  - 3  -SC    Standing up from a chair using your arms (e.g., wheelchair, bedside chair)? 3  - 3  -SC    Climbing 3-5 steps with a railing? 3  - 3  -SC    To walk in hospital room? 3  -HW 3  -SC    AM-PAC 6 Clicks Score (PT) 18  - 17  -SC    Highest Level of Mobility Goal 6 --> Walk 10 steps or more  - 5 --> Static standing  -SC      Row Name 06/27/24 1042          Functional Assessment    Outcome Measure Options AM-PAC 6 Clicks Basic Mobility (PT)  -               User Key  (r) = Recorded By, (t) = Taken By, (c) = Cosigned By      Initials Name Provider Type    SC Krissy Buenrostro, RN Registered Nurse     Nelida Walton, BOUBACAR Physical Therapist                                 Physical Therapy Education       Title: PT OT SLP Therapies (Done)       Topic: Physical Therapy (Done)       Point: Mobility training (Done)       Learning Progress Summary             Patient Acceptance, E,D, VU,NR by  at 6/27/2024 1043    Eager, E,H, VU,DU,NR by  at 6/26/2024 1152    Comment: Reviewed safety/technique w/bed mobility (log roll), transfers, ambulation, HEP, PT POC, spinal precautions    Acceptance, E,D, VU,DU by AB at 6/25/2024 0956   Significant Other Eager, E,H, VU,DU,NR by  at 6/26/2024 1152    Comment: Reviewed safety/technique w/bed mobility (log roll), transfers, ambulation, HEP, PT POC, spinal precautions    Acceptance, E,D, VU,DU by AB at 6/25/2024 0956                         Point: Home exercise program (Done)       Learning Progress Summary             Patient Acceptance, E,D, VU,NR by  at 6/27/2024 1043    Eager, E,H, VU,DU,NR by  at 6/26/2024 1152    Comment: Reviewed safety/technique w/bed mobility (log roll), transfers, ambulation, HEP, PT POC, spinal precautions    Acceptance,  E,D, VU,DU by AB at 6/25/2024 0956   Significant Other Eager, E,H, VU,DU,NR by  at 6/26/2024 1152    Comment: Reviewed safety/technique w/bed mobility (log roll), transfers, ambulation, HEP, PT POC, spinal precautions    Acceptance, E,D, VU,DU by AB at 6/25/2024 0956                         Point: Body mechanics (Done)       Learning Progress Summary             Patient Acceptance, E,D, VU,NR by  at 6/27/2024 1043    Eager, E,H, VU,DU,NR by  at 6/26/2024 1152    Comment: Reviewed safety/technique w/bed mobility (log roll), transfers, ambulation, HEP, PT POC, spinal precautions    Acceptance, E,D, VU,DU by AB at 6/25/2024 0956   Significant Other Eager, E,H, VU,DU,NR by  at 6/26/2024 1152    Comment: Reviewed safety/technique w/bed mobility (log roll), transfers, ambulation, HEP, PT POC, spinal precautions    Acceptance, E,D, VU,DU by AB at 6/25/2024 0956                         Point: Precautions (Done)       Learning Progress Summary             Patient Acceptance, E,D, VU,NR by  at 6/27/2024 1043    Eager, E,H, VU,DU,NR by  at 6/26/2024 1152    Comment: Reviewed safety/technique w/bed mobility (log roll), transfers, ambulation, HEP, PT POC, spinal precautions    Acceptance, E,D, VU,DU by AB at 6/25/2024 0956   Significant Other Eager, E,H, VU,DU,NR by  at 6/26/2024 1152    Comment: Reviewed safety/technique w/bed mobility (log roll), transfers, ambulation, HEP, PT POC, spinal precautions    Acceptance, E,D, VU,DU by AB at 6/25/2024 0956                                         User Key       Initials Effective Dates Name Provider Type Discipline     12/15/23 -  Nelida Walton, PT Physical Therapist PT    SS 06/01/21 -  Zoila Mckeon, PT Physical Therapist PT    AB 09/22/22 -  Aura Self, PT Physical Therapist PT                  PT Recommendation and Plan     Plan of Care Reviewed With: patient, significant other  Progress: no change  Outcome Evaluation: Pt amb in [place] with moderate unsteadiness  noted though no significant LOB observed. Deficits in balance, B LE motor control, strength, and gait quaility puts pt at high risk for recurrent falls. Plan is to d/c with respite care and HHPT. Skilled care would be beneficial for fall prevention.     Time Calculation:         PT Charges       Row Name 06/27/24 1043             Time Calculation    Start Time 0946  -HW      PT Received On 06/27/24  -HW         Timed Charges    55722 - PT Therapeutic Exercise Minutes 11  -HW      92706 - Gait Training Minutes  15  -HW         Total Minutes    Timed Charges Total Minutes 26  -HW       Total Minutes 26  -HW                User Key  (r) = Recorded By, (t) = Taken By, (c) = Cosigned By      Initials Name Provider Type     Nelida Walton PT Physical Therapist                  Therapy Charges for Today       Code Description Service Date Service Provider Modifiers Qty    07852723474 HC PT THER PROC EA 15 MIN 6/27/2024 Nelida Walton, PT GP 1    44821413605 HC GAIT TRAINING EA 15 MIN 6/27/2024 Nelida Walton, PT GP 1            PT G-Codes  Outcome Measure Options: AM-PAC 6 Clicks Basic Mobility (PT)  AM-PAC 6 Clicks Score (PT): 18  PT Discharge Summary  Anticipated Discharge Disposition (PT): other (see comments) (respite at the [facility] with HHPT)    Nelida Walton PT  6/27/2024

## 2024-06-27 NOTE — DISCHARGE SUMMARY
[facility]    Date of Admission: 6/24/2024  Date of Discharge:  6/27/2024    Discharge Diagnosis: Lumbar stenosis with neurogenic claudication     Procedures Performed  Procedure(s):  Lumbar decompressive laminectomies at L3-4 and L4-5 with discectomy at the L3-4      History of Present Illness:  Mr. Haq is a 73 y.o. male that presented with bilateral leg pain and with walking and standing intolerance. Studies demonstrated lumbar stenosis due to facet ligamentous arthopathy and a large central disc protrustion at L3-4. Thus, he underwent decompressive laminectomies from L3-5 and a discectomy at L3-4 by Dr. Mckeon on 6/24/2024. A ZITA drain was placed and the surgical incision was closed subcuticularly.     Hospital Course:   Mr. Haq was admitted to the floor for observation. The patient found that repositioning alleviated his postoperative pain, and he did not need to take pain medications. On POD #0 he experienced some urinary hesitancy; two occurrences of intermittent straight catheterization took place. He ultimately was able to void independently on POD #1. He participated with physical therapy. It was recommended that he discharge home with 24/7 care and outpatient therapy, but the patient lives alone. His ZITA drain was discontinued on POD #2. The patient ultimately discharged to a self-pay respite at the [facility] on POD #3.    Condition on Discharge:  Stable  Discharge to: Respite care    PATIENT SPECIFIC EDUCATION/PLAN:  1. Follow-up with neurosurgery [facility] in 3 weeks.   2. No driving until follow-up.  3. The patient may get his Aquacel dressing wet in the shower. He may remove the Aquacel dressing and get the surgical incision itself wet beginning on 6/29/2024.    4. NO tub bathing or swimming until follow-up  5. Ice pack to incision(s) as needed for associated pain or swelling   6. The patient is to ambulate frequently at home. No  sitting except to void until follow-up. No lifting greater than 5 pounds.       Discharge Medications     Discharge Medications        Continue These Medications        Instructions Start Date   acetaminophen 500 MG tablet  Commonly known as: TYLENOL   1,000 mg, Oral, Every 6 Hours PRN      acetaminophen 650 MG 8 hr tablet  Commonly known as: TYLENOL   650 mg, Oral, Every 8 Hours PRN      ascorbic acid 500 MG tablet  Commonly known as: VITAMIN C   500 mg, Oral, Daily      diclofenac-miSOPROStol 75-0.2 MG EC tablet  Commonly known as: ARTHROTEC 75   1 tablet, Oral, 2 Times Daily      hydroCHLOROthiazide 12.5 MG capsule  Commonly known as: MICROZIDE   12.5 mg, Oral, Daily      losartan 50 MG tablet  Commonly known as: COZAAR   50 mg, Oral, Every Morning      lovastatin 40 MG tablet  Commonly known as: MEVACOR   40 mg, Oral, Nightly, for cholesterol      metroNIDAZOLE 1 % gel  Commonly known as: METROGEL   1 Application, Topical, Daily      psyllium 28.3 % pack pack  Commonly known as: KONSYL   1 packet, Oral, Daily      tamsulosin 0.4 MG capsule 24 hr capsule  Commonly known as: FLOMAX   1 capsule, Oral, Nightly      Undecylenic Acid 25 % liquid   1 dose, Apply externally, Nightly               Follow-up Appointments  Future Appointments   Date Time Provider Department Center   7/17/2024  1:00 PM Andria Weinberg PA-C [facility]     Additional Instructions for the Follow-ups that You Need to Schedule       Ambulatory Referral to Home Health (Hospital)   As directed      Face to Face Visit Date: 6/26/2024   Follow-up provider for Plan of Care?: I treated the patient in an acute care facility and will not continue treatment after discharge.   Follow-up provider: KENYA RUSSELL [[id number]]   Reason/Clinical Findings: Altered mobility   Describe mobility limitations that make leaving home difficult: As above   Nursing/Therapeutic Services Requested: Physical Therapy Occupational Therapy   PT orders: Therapeutic  exercise Gait Training Strengthening   Weight Bearing Status: As Tolerated   Occupational orders: Activities of daily living Energy conservation Home safety assessment   Frequency: 1 Week 1        Discharge Follow-up with Specified Provider: [facility]; 3 Weeks   As directed      To: [facility]   Follow Up: 3 Weeks   Follow Up Details: Follow-up with physician's assistant in 3 weeks                Referring Provider  MD ANTHONY Nagy Joseph E, MD Laryssa Helene Cybriwsky, PA-C  06/27/24  10:02 EDT

## 2024-06-27 NOTE — PROGRESS NOTES
Spoke with patient he is agreeable to [facility] services. Verified PCP. Jennifer MONTAÑO, [facility]

## 2024-06-27 NOTE — PLAN OF CARE
Problem: Adult Inpatient Plan of Care  Goal: Plan of Care Review  Outcome: Ongoing, Progressing  Goal: Patient-Specific Goal (Individualized)  Outcome: Ongoing, Progressing  Goal: Absence of Hospital-Acquired Illness or Injury  Outcome: Ongoing, Progressing  Intervention: Identify and Manage Fall Risk  Description: Perform standard risk assessment on admission using a validated tool or comprehensive approach appropriate to the patient; reassess fall risk frequently, with change in status or transfer to another level of care.  Communicate fall injury risk to interprofessional healthcare team.  Determine need for increased observation, equipment and environmental modification, such as low bed, signage and supportive, nonskid footwear.  Adjust safety measures to individual developmental age, stage and identified risk factors.  Reinforce the importance of safety and physical activity with patient and family.  Perform regular intentional rounding to assess need for position change, pain assessment and personal needs, including assistance with toileting.  Recent Flowsheet Documentation  Taken 6/27/2024 0200 by Roya Trivedi RN  Safety Promotion/Fall Prevention: activity supervised  Taken 6/27/2024 0030 by Roya Trivedi RN  Safety Promotion/Fall Prevention:   activity supervised   mobility aid in reach   fall prevention program maintained   nonskid shoes/slippers when out of bed   room organization consistent   safety round/check completed   toileting scheduled  Taken 6/26/2024 2250 by Roya Trivedi RN  Safety Promotion/Fall Prevention: activity supervised  Taken 6/26/2024 2020 by Roya Trivedi RN  Safety Promotion/Fall Prevention: safety round/check completed  Intervention: Prevent Skin Injury  Description: Perform a screening for skin injury risk, such as pressure or moisture associated skin damage on admission and at regular intervals throughout hospital stay.  Keep all areas of skin  (especially folds) clean and dry.  Maintain adequate skin hydration.  Relieve and redistribute pressure and protect bony prominences; implement measures based on patient-specific risk factors.  Match turning and repositioning schedule to clinical condition.  Encourage weight shift frequently; assist with reposition if unable to complete independently.  Float heels off bed; avoid pressure on the Achilles tendon.  Keep skin free from extended contact with medical devices.  Encourage functional activity and mobility, as early as tolerated.  Use aids (e.g., slide boards, mechanical lift) during transfer.  Recent Flowsheet Documentation  Taken 6/27/2024 0200 by Roya Trivedi RN  Body Position: position changed independently  Skin Protection: adhesive use limited  Taken 6/27/2024 0030 by Roya Trivedi RN  Body Position: position changed independently  Skin Protection:   adhesive use limited   tubing/devices free from skin contact   transparent dressing maintained  Taken 6/26/2024 2250 by Roya Trivedi RN  Body Position: position changed independently  Skin Protection: adhesive use limited  Taken 6/26/2024 2020 by Roya Trivedi RN  Body Position: position changed independently  Intervention: Prevent and Manage VTE (Venous Thromboembolism) Risk  Description: Assess for VTE (venous thromboembolism) risk.  Encourage and assist with early ambulation.  Initiate and maintain compression or other therapy, as indicated, based on identified risk in accordance with organizational protocol and provider order.  Encourage both active and passive leg exercises while in bed, if unable to ambulate.  Recent Flowsheet Documentation  Taken 6/27/2024 0200 by Roya Trivedi RN  Activity Management: activity encouraged  VTE Prevention/Management:   sequential compression devices on   bilateral  Taken 6/27/2024 0030 by Roya Trivedi RN  Activity Management: activity encouraged  VTE Prevention/Management:    sequential compression devices on   bilateral  Taken 6/26/2024 2250 by Roya Trivedi RN  Activity Management: activity encouraged  VTE Prevention/Management:   sequential compression devices on   bilateral  Taken 6/26/2024 2020 by Roya Trivedi RN  Activity Management: activity encouraged  VTE Prevention/Management:   sequential compression devices on   bilateral  Range of Motion: active ROM (range of motion) encouraged  Intervention: Prevent Infection  Description: Maintain skin and mucous membrane integrity; promote hand, oral and pulmonary hygiene.  Optimize fluid balance, nutrition, sleep and glycemic control to maximize infection resistance.  Identify potential sources of infection early to prevent or mitigate progression of infection (e.g., wound, lines, devices).  Evaluate ongoing need for invasive devices; remove promptly when no longer indicated.  Recent Flowsheet Documentation  Taken 6/27/2024 0200 by Roya Trivedi RN  Infection Prevention:   environmental surveillance performed   personal protective equipment utilized   rest/sleep promoted   single patient room provided   visitors restricted/screened  Taken 6/27/2024 0030 by Roya Trivedi RN  Infection Prevention:   environmental surveillance performed   single patient room provided  Taken 6/26/2024 2250 by Roya Trivedi RN  Infection Prevention:   environmental surveillance performed   personal protective equipment utilized   rest/sleep promoted   single patient room provided   visitors restricted/screened  Taken 6/26/2024 2020 by Roya Trivedi RN  Infection Prevention: visitors restricted/screened  Goal: Optimal Comfort and Wellbeing  Outcome: Ongoing, Progressing  Intervention: Monitor Pain and Promote Comfort  Description: Assess pain level, treatment efficacy and patient response at regular intervals using a consistent pain scale.  Consider the presence and impact of preexisting chronic pain.  Encourage patient and  caregiver involvement in pain assessment, interventions and safety measures.  Recent Flowsheet Documentation  Taken 6/26/2024 2020 by Roya Trivedi RN  Pain Management Interventions:   position adjusted   medication offered but refused  Intervention: Provide Person-Centered Care  Description: Use a family-focused approach to care.  Develop trust and rapport by proactively providing information, encouraging questions, addressing concerns and offering reassurance.  Acknowledge emotional response to hospitalization.  Recognize and utilize personal coping strategies.  Honor spiritual and cultural preferences.  Recent Flowsheet Documentation  Taken 6/26/2024 2020 by Roya Trivedi RN  Trust Relationship/Rapport:   care explained   reassurance provided   thoughts/feelings acknowledged  Goal: Readiness for Transition of Care  Outcome: Ongoing, Progressing     Problem: Hypertension Comorbidity  Goal: Blood Pressure in Desired Range  Outcome: Ongoing, Progressing  Intervention: Maintain Blood Pressure Management  Description: Evaluate adherence to home antihypertensive regimen (e.g., exercise and activity, diet modification, medication).  Provide scheduled antihypertensive medication; consider administration time and effects (e.g., avoid giving diuretic prior to bedtime).  Monitor response to antihypertensive medication therapy (e.g., blood pressure, electrolyte levels, medication effects).  Minimize risk of orthostatic hypotension; encourage caution with position changes, particularly if elderly.  Recent Flowsheet Documentation  Taken 6/27/2024 0200 by Roya Trivedi RN  Medication Review/Management: medications reviewed  Taken 6/27/2024 0030 by Roya Trivedi RN  Medication Review/Management: medications reviewed  Taken 6/26/2024 2250 by Roya Trivedi RN  Medication Review/Management: medications reviewed  Taken 6/26/2024 2020 by Roya Trivedi RN  Medication Review/Management: medications  reviewed     Problem: Fall Injury Risk  Goal: Absence of Fall and Fall-Related Injury  Outcome: Ongoing, Progressing  Intervention: Identify and Manage Contributors  Description: Develop a fall prevention plan with the patient and caregiver/family.  Provide reorientation, appropriate sensory stimulation and routines with changes in mental status to decrease risk of fall.  Promote use of personal vision and auditory aids.  Assess assistance level required for safe and effective self-care; provide support as needed, such as toileting, mobilization. For age 65 and older, implement timed toileting with assistance.  Encourage physical activity, such as performance of mobility and self-care at highest level of patient ability, multicomponent exercise program and provision of appropriate assistive devices.  If fall occurs, assess the severity of injury; implement fall injury protocol. Determine the cause and revise fall injury prevention plan.  Regularly review medication contribution to fall risk; adjust medication administration times to minimize risk of falling.  Consider risk related to polypharmacy and age.  Balance adequate pain management with potential for oversedation.  Recent Flowsheet Documentation  Taken 6/27/2024 0200 by Roya Trivedi RN  Medication Review/Management: medications reviewed  Taken 6/27/2024 0030 by Roya Trivedi RN  Medication Review/Management: medications reviewed  Taken 6/26/2024 2250 by Roya Trivedi RN  Medication Review/Management: medications reviewed  Taken 6/26/2024 2020 by Roya Trivedi RN  Medication Review/Management: medications reviewed  Intervention: Promote Injury-Free Environment  Description: Provide a safe, barrier-free environment that encourages independent activity.  Keep care area uncluttered and well-lighted.  Determine need for increased observation or monitoring.  Avoid use of devices that minimize mobility, such as restraints or indwelling  urinary catheter.  Recent Flowsheet Documentation  Taken 6/27/2024 0200 by Roya Trivedi RN  Safety Promotion/Fall Prevention: activity supervised  Taken 6/27/2024 0030 by Roya Trivedi RN  Safety Promotion/Fall Prevention:   activity supervised   mobility aid in reach   fall prevention program maintained   nonskid shoes/slippers when out of bed   room organization consistent   safety round/check completed   toileting scheduled  Taken 6/26/2024 2250 by Roya Trivedi RN  Safety Promotion/Fall Prevention: activity supervised  Taken 6/26/2024 2020 by Roya Trivedi RN  Safety Promotion/Fall Prevention: safety round/check completed     Problem: Bleeding (Spinal Surgery)  Goal: Absence of Bleeding  Outcome: Ongoing, Progressing     Problem: Bowel Motility Impaired (Spinal Surgery)  Goal: Effective Bowel Elimination  Outcome: Ongoing, Progressing     Problem: Fluid and Electrolyte Imbalance (Spinal Surgery)  Goal: Fluid and Electrolyte Balance  Outcome: Ongoing, Progressing     Problem: Functional Ability Impaired (Spinal Surgery)  Goal: Optimal Functional Ability  Outcome: Ongoing, Progressing  Intervention: Optimize Functional Status  Description: Maintain individualized post-operative restrictions such as neutral spine positioning and avoiding bending, twisting, lifting or prolonged sitting.  Encourage gradual increase in functional activity and mobility; coordinate pain control to optimize comfort with activity.  Maintain an accessible environment to facilitate safe activity.  Implement wearing of brace/orthosis as indicated; train in donning/doffing along with wear and care.  Train in proper body mechanics for daily activities and mobility.  Assess and retrain in functional mobility, such as bed mobility using log rolling techniques, transfers and gait with neutral spine. Encourage ambulation to minimize risks associated with immobility.  Assess activity of daily living performance; train in  compensatory strategies, energy conservation and adaptive equipment; consider preferred routines or habits and respect patient privacy.  Position for optimal comfort and activity tolerance.  Offer personal aids, such as glasses, hearing aids and dentures.  Recent Flowsheet Documentation  Taken 6/27/2024 0200 by Roya Trivedi RN  Activity Management: activity encouraged  Positioning/Transfer Devices:   pillows   in use  Taken 6/27/2024 0030 by Roya Trivedi RN  Activity Management: activity encouraged  Positioning/Transfer Devices:   pillows   in use  Taken 6/26/2024 2250 by Roya Trivedi RN  Activity Management: activity encouraged  Positioning/Transfer Devices:   pillows   in use  Taken 6/26/2024 2020 by Roya Trivedi RN  Activity Management: activity encouraged  Positioning/Transfer Devices:   pillows   in use     Problem: Infection (Spinal Surgery)  Goal: Absence of Infection Signs and Symptoms  Outcome: Ongoing, Progressing  Intervention: Prevent or Manage Infection  Description: Optimize activity and mobility to maximize infection resistance (e.g., reposition, sit in chair, ambulate).  Maintain normothermia and glycemic control to maximize infection resistance.  Maintain dressing and closed drainage system integrity to reduce the risk for infection; inspect incision as visible.  Implement transmission-based precautions and isolation, as indicated, to prevent spread of infection.  Discontinue prophylactic antimicrobial agent within 24 hours after procedure, as directed.  Identify early signs of sepsis, such as increased heart rate and decreased blood pressure, as well as changes in mental state, respiratory pattern or peripheral perfusion.  Prepare for rapid sepsis management, including lactate level, intravenous access, fluid administration and oxygen therapy.  Provide fever-reduction and comfort measures.  Recent Flowsheet Documentation  Taken 6/27/2024 0200 by Roya Trivedi  RN  Infection Prevention:   environmental surveillance performed   personal protective equipment utilized   rest/sleep promoted   single patient room provided   visitors restricted/screened  Taken 6/27/2024 0030 by Roya Trivedi RN  Infection Prevention:   environmental surveillance performed   single patient room provided  Taken 6/26/2024 2250 by Ryoa Trivedi RN  Infection Prevention:   environmental surveillance performed   personal protective equipment utilized   rest/sleep promoted   single patient room provided   visitors restricted/screened  Taken 6/26/2024 2020 by Roya Trivedi RN  Infection Prevention: visitors restricted/screened     Problem: Neurologic Impairment (Spinal Surgery)  Goal: Optimal Neurologic Function  Outcome: Ongoing, Progressing  Intervention: Optimize Neurologic Function  Description: Encourage ambulation to minimize risks associated with immobilization.  Assess neurovascular status frequently including color, circulation, movement and sensation.  Consider therapeutic interventions, such as lumbar stabilization, neural mobilization, postural training.  Evaluate and address body systems and performance deficits, such as range of motion and activity tolerance, while considering any postoperative restrictions.  Evaluate somatosensory (e.g., pressure, touch, sharp/dull) status to determine type and extent of sensory impairment.  Incorporate brace/orthosis, as indicated, such as when out of bed, in chair and with activity.  Recent Flowsheet Documentation  Taken 6/27/2024 0200 by Roya Trivedi RN  Body Position: position changed independently  Pressure Reduction Devices: pressure-redistributing mattress utilized  Taken 6/27/2024 0030 by Roya Trivedi RN  Body Position: position changed independently  Pressure Reduction Devices: pressure-redistributing mattress utilized  Taken 6/26/2024 2250 by Roya Trivedi RN  Body Position: position changed  independently  Pressure Reduction Devices: pressure-redistributing mattress utilized  Taken 6/26/2024 2020 by Roya Trivedi RN  Body Position: position changed independently  Pressure Reduction Devices: pressure-redistributing mattress utilized  Range of Motion: active ROM (range of motion) encouraged     Problem: Ongoing Anesthesia Effects (Spinal Surgery)  Goal: Anesthesia/Sedation Recovery  Outcome: Ongoing, Progressing  Intervention: Optimize Anesthesia Recovery  Description: Assess and monitor airway, breathing and circulation; maintain close surveillance for deterioration.  Implement continuous monitoring, such as cardiorespiratory, blood pressure, temperature, pulse oximetry and capnography.  Elevate head of bed, if able; facilitate regular position changes.  Assess neurocognitive function and for risks that may lead to postoperative delirium, such as decreased level of consciousness, pain and agitation; offer reassurance; answer questions.  Assess and monitor neurovascular and neuromuscular function, such as motor strength, tone, posture, peripheral pulses and extremity sensation; protect areas of decreased sensation from heat, cold, medical devices or objects.  Individualize frequency and intensity of monitoring based on sedation or anesthesia administered, identified risk factors, ongoing assessment and organizational protocol.  Prepare for administration of pharmacologic therapy, such as reversal agent, antiemetic or antipruritic medication, to manage sedation or anesthesia effects.  Adjust environment to maintain safety (e.g., fall precautions, safety equipment).  Recent Flowsheet Documentation  Taken 6/27/2024 0200 by Roya Trivedi, RN  Safety Promotion/Fall Prevention: activity supervised  Taken 6/27/2024 0030 by Roya Trivedi RN  Safety Promotion/Fall Prevention:   activity supervised   mobility aid in reach   fall prevention program maintained   nonskid shoes/slippers when out of  bed   room organization consistent   safety round/check completed   toileting scheduled  Taken 6/26/2024 2250 by Roya Trivedi RN  Safety Promotion/Fall Prevention: activity supervised  Taken 6/26/2024 2020 by Roya Trivedi RN  Safety Promotion/Fall Prevention: safety round/check completed  Taken 6/26/2024 2000 by Roya Trivedi RN  Patient Tolerance (IS):   good   no adverse signs/symptoms present  Administration (IS):   instruction provided, follow-up   self-administered  Level Incentive Spirometer (mL): 3000  Incentive Spirometer Predicted Level (mL): 1500  Number of Repetitions (IS): 10     Problem: Pain (Spinal Surgery)  Goal: Acceptable Pain Control  Outcome: Ongoing, Progressing  Intervention: Prevent or Manage Pain  Description: Develop mutual pain management plan with patient and caregiver/family; review regularly.  Evaluate risk for opioid use; individualize pharmacologic pain management plan and titrate medication to patient response.  Combine multimodal analgesia and nonpharmacologic strategies to help potentiate synergistic effects and enhance comfort (e.g., complementary therapy, diversional activity).  Provide around-the-clock dosing of pain medication to keep pain levels in control.  Manage medication-induced effects, such as respiratory depression, constipation, nausea, vomiting.  Minimize pain stimuli; coordinate care and adjust environment (e.g., light, noise, unnecessary movement); promote sleep/rest for optimal healing.  Recent Flowsheet Documentation  Taken 6/26/2024 2020 by Roya Trivedi RN  Pain Management Interventions:   position adjusted   medication offered but refused  Diversional Activities:   television   smartphone     Problem: Postoperative Nausea and Vomiting (Spinal Surgery)  Goal: Nausea and Vomiting Relief  Outcome: Ongoing, Progressing     Problem: Postoperative Urinary Retention (Spinal Surgery)  Goal: Effective Urinary Elimination  Outcome: Ongoing,  Progressing     Problem: Respiratory Compromise (Spinal Surgery)  Goal: Effective Oxygenation and Ventilation  Outcome: Ongoing, Progressing  Intervention: Optimize Oxygenation and Ventilation  Description: Recognize risk for obstructive sleep apnea; anticipate the need for continuous pulse oximetry and noninvasive positive pressure ventilation.  Maintain patent airway with positioning, airway adjuncts, secretion clearance.  Encourage pulmonary hygiene, such as cough-enhancement and airway-clearance techniques, that may include use of incentive spirometry, deep breathing and cough.  Anticipate the need for splinting with cough to minimize discomfort; assist if needed.  Provide oxygen therapy judiciously, if hypoxemia present.  Consider pharmacologic therapy that may improve mucus clearance and reduce bronchospasm, laryngospasm, swelling or stridor.  Consider the need for intubation and invasive positive pressure ventilation for longer recovery needs; use lung protective measures.  Recent Flowsheet Documentation  Taken 6/27/2024 0200 by Roya Trivedi RN  Head of Bed (HOB) Positioning: HOB at 30-45 degrees  Taken 6/27/2024 0030 by Roya Trivedi RN  Head of Bed (HOB) Positioning: HOB elevated  Taken 6/26/2024 2250 by Roya Trivedi RN  Head of Bed (HOB) Positioning: HOB at 30-45 degrees  Taken 6/26/2024 2020 by Roya Trivedi RN  Head of Bed (HOB) Positioning:   HOB elevated   HOB lowered   Goal Outcome Evaluation:      Patient is alert and oriented X4, pleasant, compliant with treatment regimen. VSS. No acute events overnight. Miralax given overnight per patient request, feels urge to have BM. Very small BM this morning (06/27). Plan is to discharge to the [facility] today. Roya Trivedi RN

## 2024-06-27 NOTE — PLAN OF CARE
Goal Outcome Evaluation:  Plan of Care Reviewed With: patient, significant other        Progress: no change  Outcome Evaluation: Pt amb in [place] with moderate unsteadiness noted though no significant LOB observed. Deficits in balance, B LE motor control, strength, and gait quaility puts pt at high risk for recurrent falls. Plan is to d/c with respite care and HHPT. Skilled care would be beneficial for fall prevention.      Anticipated Discharge Disposition (PT): other (see comments) (respite at the [facility] with HHPT)

## 2024-06-28 ENCOUNTER — HOME CARE VISIT (OUTPATIENT)
Dept: HOME HEALTH SERVICES | Facility: HOME HEALTHCARE | Age: 74
End: 2024-06-28
Payer: MEDICARE

## 2024-06-28 VITALS
DIASTOLIC BLOOD PRESSURE: 78 MMHG | TEMPERATURE: 97.8 F | HEART RATE: 72 BPM | OXYGEN SATURATION: 99 % | SYSTOLIC BLOOD PRESSURE: 136 MMHG | RESPIRATION RATE: 17 BRPM

## 2024-06-28 PROCEDURE — G0151 HHCP-SERV OF PT,EA 15 MIN: HCPCS

## 2024-06-28 NOTE — HOME HEALTH
"SOC Note:    Patient states the week before his back surgery, he fell in his garage, causing injury to his R elbow.  He is R hand dominant.  Prior to staying at [facility], he was indep with performance of all functional mobility tasks, amb with st cane and RW prn, was driving, hx of frequent falls.  He states he has a history of falls following a cervical laminectomy in 2021. He is staying for 2 weeks at [facility].     Home Health ordered for: disciplines PT, OT    Reason for Hosp/Primary Dx/Co-morbidities: Patient demo deficits in standing balance, ambulation distance/technique, bilateral LE strength, elevated falls risk secondary to recent back surgery and dx of encounter for orthopedic aftercare.     Focus of Care: PT to focus on functional deficits related to encouter for orthopedic aftercare.    Patient's goal(s): \"I want to get stronger, go home and not fall.\"    Current Functional status/mobility/DME: RW, st cane, BSC, grab bars    HB status/Living Arrangements: Patient lives in a 1 story home alone, but is currently staying at [facility] x 2 weeks post op.     Skin Integrity/wound status: WFL     Code Status: Full Code    Fall Risk/Safety concerns: Ongoing falls risk/history    Educated on Emergency Plan, steps to take prior to going to the ER and when to Call [facility]:  patient aware.      Medication issues/Concerns: n/a    Additional Problems/Concerns: n/a    SDOH Barriers (i.e. caregiver concerns, social isolation, transportation, food insecurity, environment, income etc.)/Need for MSW: n/a"

## 2024-06-29 ENCOUNTER — HOSPITAL ENCOUNTER (EMERGENCY)
Facility: HOSPITAL | Age: 74
Discharge: HOME | End: 2024-06-29
Attending: FAMILY MEDICINE
Payer: MEDICARE

## 2024-06-29 ENCOUNTER — APPOINTMENT (OUTPATIENT)
Facility: HOSPITAL | Age: 74
End: 2024-06-29
Payer: MEDICARE

## 2024-06-29 VITALS
BODY MASS INDEX: 24.34 KG/M2 | TEMPERATURE: 98.6 F | WEIGHT: 170 LBS | RESPIRATION RATE: 20 BRPM | OXYGEN SATURATION: 100 % | DIASTOLIC BLOOD PRESSURE: 73 MMHG | HEART RATE: 76 BPM | SYSTOLIC BLOOD PRESSURE: 122 MMHG | HEIGHT: 70 IN

## 2024-06-29 DIAGNOSIS — T81.30XA WOUND DEHISCENCE: ICD-10-CM

## 2024-06-29 DIAGNOSIS — M70.31 BURSITIS OF RIGHT ELBOW, UNSPECIFIED BURSA: Primary | ICD-10-CM

## 2024-06-29 LAB
ALBUMIN SERPL-MCNC: 3.9 G/DL (ref 3.5–5.2)
ALBUMIN/GLOB SERPL: 1.3 G/DL
ALP SERPL-CCNC: 73 U/L (ref 39–117)
ALT SERPL W P-5'-P-CCNC: 38 U/L (ref 1–41)
ANION GAP SERPL CALCULATED.3IONS-SCNC: 11.5 MMOL/L (ref 5–15)
AST SERPL-CCNC: 35 U/L (ref 1–40)
BASOPHILS # BLD AUTO: 0.03 10*3/MM3 (ref 0–0.2)
BASOPHILS NFR BLD AUTO: 0.4 % (ref 0–1.5)
BILIRUB SERPL-MCNC: 0.6 MG/DL (ref 0–1.2)
BUN SERPL-MCNC: 23 MG/DL (ref 8–23)
BUN/CREAT SERPL: 25.3 (ref 7–25)
CALCIUM SPEC-SCNC: 8.8 MG/DL (ref 8.6–10.5)
CHLORIDE SERPL-SCNC: 100 MMOL/L (ref 98–107)
CO2 SERPL-SCNC: 27.5 MMOL/L (ref 22–29)
CREAT SERPL-MCNC: 0.91 MG/DL (ref 0.76–1.27)
DEPRECATED RDW RBC AUTO: 44.3 FL (ref 37–54)
EGFRCR SERPLBLD CKD-EPI 2021: 89 ML/MIN/1.73
EOSINOPHIL # BLD AUTO: 0.12 10*3/MM3 (ref 0–0.4)
EOSINOPHIL NFR BLD AUTO: 1.5 % (ref 0.3–6.2)
ERYTHROCYTE [DISTWIDTH] IN BLOOD BY AUTOMATED COUNT: 13.6 % (ref 12.3–15.4)
GLOBULIN UR ELPH-MCNC: 3 GM/DL
GLUCOSE SERPL-MCNC: 205 MG/DL (ref 65–99)
HCT VFR BLD AUTO: 38.9 % (ref 37.5–51)
HGB BLD-MCNC: 13.4 G/DL (ref 13–17.7)
IMM GRANULOCYTES # BLD AUTO: 0.01 10*3/MM3 (ref 0–0.05)
IMM GRANULOCYTES NFR BLD AUTO: 0.1 % (ref 0–0.5)
LYMPHOCYTES # BLD AUTO: 1.59 10*3/MM3 (ref 0.7–3.1)
LYMPHOCYTES NFR BLD AUTO: 20 % (ref 19.6–45.3)
MCH RBC QN AUTO: 30.1 PG (ref 26.6–33)
MCHC RBC AUTO-ENTMCNC: 34.4 G/DL (ref 31.5–35.7)
MCV RBC AUTO: 87.4 FL (ref 79–97)
MONOCYTES # BLD AUTO: 0.52 10*3/MM3 (ref 0.1–0.9)
MONOCYTES NFR BLD AUTO: 6.5 % (ref 5–12)
NEUTROPHILS NFR BLD AUTO: 5.67 10*3/MM3 (ref 1.7–7)
NEUTROPHILS NFR BLD AUTO: 71.5 % (ref 42.7–76)
PLATELET # BLD AUTO: 285 10*3/MM3 (ref 140–450)
PMV BLD AUTO: 9.7 FL (ref 6–12)
POTASSIUM SERPL-SCNC: 3.4 MMOL/L (ref 3.5–5.2)
PROT SERPL-MCNC: 6.9 G/DL (ref 6–8.5)
RBC # BLD AUTO: 4.45 10*6/MM3 (ref 4.14–5.8)
SODIUM SERPL-SCNC: 139 MMOL/L (ref 136–145)
WBC NRBC COR # BLD AUTO: 7.94 10*3/MM3 (ref 3.4–10.8)

## 2024-06-29 PROCEDURE — 73080 X-RAY EXAM OF ELBOW: CPT

## 2024-06-29 PROCEDURE — 99283 EMERGENCY DEPT VISIT LOW MDM: CPT

## 2024-06-29 PROCEDURE — 87205 SMEAR GRAM STAIN: CPT

## 2024-06-29 PROCEDURE — 85025 COMPLETE CBC W/AUTO DIFF WBC: CPT

## 2024-06-29 PROCEDURE — 80053 COMPREHEN METABOLIC PANEL: CPT

## 2024-06-29 PROCEDURE — 87070 CULTURE OTHR SPECIMN AEROBIC: CPT

## 2024-06-29 RX ORDER — SODIUM CHLORIDE 0.9 % (FLUSH) 0.9 %
10 SYRINGE (ML) INJECTION AS NEEDED
Status: DISCONTINUED | OUTPATIENT
Start: 2024-06-29 | End: 2024-06-29 | Stop reason: HOSPADM

## 2024-06-29 RX ORDER — MUPIROCIN CALCIUM 20 MG/G
1 CREAM TOPICAL 3 TIMES DAILY
Qty: 30 G | Refills: 0 | Status: SHIPPED | OUTPATIENT
Start: 2024-06-29

## 2024-06-29 RX ORDER — DIAPER,BRIEF,INFANT-TODD,DISP
1 EACH MISCELLANEOUS EVERY 12 HOURS SCHEDULED
Status: DISCONTINUED | OUTPATIENT
Start: 2024-06-29 | End: 2024-06-29 | Stop reason: HOSPADM

## 2024-06-29 RX ORDER — POTASSIUM CHLORIDE 750 MG/1
20 CAPSULE, EXTENDED RELEASE ORAL ONCE
Status: COMPLETED | OUTPATIENT
Start: 2024-06-29 | End: 2024-06-29

## 2024-06-29 RX ADMIN — POTASSIUM CHLORIDE 20 MEQ: 750 CAPSULE, EXTENDED RELEASE ORAL at 20:36

## 2024-06-29 RX ADMIN — BACITRACIN 0.9 G: 500 OINTMENT TOPICAL at 21:06

## 2024-06-29 NOTE — FSED PROVIDER NOTE
Subjective  History of Present Illness:    Patient is a 73-year-old male who presents emergency department today with concerns of an opening to his right elbow.  Patient fell in his garage on 6/17/2024 and had a laceration noted to the dorsal aspect of his right elbow that required sutures.  There is no fracture noted at that time.  Patient had the sutures placed and subsequently removed.  Patient is currently staying at the [facility] for rehab after having an L4 laminectomy that went smoothly.  Staff members at the rehab facility noticed his right elbow wound draining yellow-colored fluid today and he was sent to the emergency department here for evaluation.  Patient denies any pain.  Patient denies any fevers or chills.  Patient denies any decreased range of motion.      Nurses Notes reviewed and agree, including vitals, allergies, social history and prior medical history.     REVIEW OF SYSTEMS: All systems reviewed and not pertinent unless noted.  Review of Systems   Constitutional:  Negative for activity change, appetite change, chills, diaphoresis and fatigue.   Respiratory:  Negative for cough, shortness of breath and wheezing.    Cardiovascular:  Negative for chest pain.   Gastrointestinal:  Negative for abdominal pain, constipation, diarrhea, nausea and vomiting.   Musculoskeletal:  Negative for back pain, joint swelling, myalgias and neck pain.   Skin:  Positive for wound.        Patient has an open laceration noted to the posterior aspect of his right elbow with surrounding erythema and pale yellow drainage.   All other systems reviewed and are negative.      Past Medical History:   Diagnosis Date    Balance problem     since neck surgery in 2021    Cancer     Basal cell skin cancer    History of COVID-19 05/24/2024    took Paxlovid; no hospitalization required    History of recent fall 06/17/2024    lost balance and fell in garage when getting into car to come to [facility] in preparation for back surgery;  "large cut to left elbow    Hyperlipidemia     Hypertension     Pre-diabetes     Rosacea     Wears glasses        Allergies:    Patient has no known allergies.      Past Surgical History:   Procedure Laterality Date    [facility]     CERVICAL DISCECTOMY POSTERIOR FUSION WITH BRAIN LAB N/A 8/12/2021    Procedure: POSTERIOR CERVICAL FUSION VERTEX SYSTEM, LAMINECTOMIES & FUSION C3-7;  Surgeon: Osmani Mckeon MD;  Location:  [facility];  Service: Neurosurgery;  Laterality: N/A;    COLONOSCOPY  2016    HIP ARTHROPLASTY Right 12/03/2018    Dr. Kimble    LUMBAR LAMINECTOMY DISCECTOMY DECOMPRESSION N/A 6/24/2024    Procedure: Lumbar decompressive laminectomies at L3-4 and L4-5 with discectomy at the L3-4;  Surgeon: Osmani Mckeon MD;  Location:  [facility];  Service: Neurosurgery;  Laterality: N/A;    TONSILLECTOMY           Social History     Socioeconomic History    Marital status:    Tobacco Use    Smoking status: Never     Passive exposure: Never    Smokeless tobacco: Never   Vaping Use    Vaping status: Never Used   Substance and Sexual Activity    Alcohol use: Not Currently     Alcohol/week: 7.0 standard drinks of alcohol     Types: 7 Cans of beer per week     Comment: 7 drinks per day    Drug use: Never    Sexual activity: Defer         Family History   Problem Relation Age of Onset    Cancer Mother     Stroke Father        Objective  Physical Exam:  /73   Pulse 76   Temp 98.6 °F (37 °C) (Oral)   Resp 20   Ht 177.8 cm (70\")   Wt 77.1 kg (170 lb)   SpO2 100%   BMI 24.39 kg/m²      Physical Exam  Vitals and nursing note reviewed.   Constitutional:       Appearance: Normal appearance. He is normal weight.   HENT:      Head: Normocephalic and atraumatic.      Mouth/Throat:      Mouth: Mucous membranes are moist.      Pharynx: Oropharynx is clear.   Eyes:      Extraocular Movements: Extraocular movements intact.      Conjunctiva/sclera: " Conjunctivae normal.      Pupils: Pupils are equal, round, and reactive to light.   Cardiovascular:      Rate and Rhythm: Normal rate and regular rhythm.      Pulses: Normal pulses.      Heart sounds: Normal heart sounds.   Pulmonary:      Effort: Pulmonary effort is normal.      Breath sounds: Normal breath sounds.   Musculoskeletal:         General: Deformity and signs of injury present. Normal range of motion.      Right elbow: Swelling, deformity and laceration present. No effusion. Normal range of motion. No tenderness.        Arms:       Cervical back: Normal range of motion and neck supple.      Comments: Patient had an open laceration and evidence of bursitis.  With light yellow drainage coming from the wound.  The laceration was approximately 4 cm in length.   Skin:     General: Skin is warm and dry.      Capillary Refill: Capillary refill takes less than 2 seconds.   Neurological:      General: No focal deficit present.      Mental Status: He is alert and oriented to person, place, and time. Mental status is at baseline.   Psychiatric:         Mood and Affect: Mood normal.         Behavior: Behavior normal.         Thought Content: Thought content normal.         Judgment: Judgment normal.         Wound Care    Date/Time: 6/29/2024 9:35 PM    Performed by: Padmini Law PA-C  Authorized by: Gee Desai DO    Consent:     Consent obtained:  Verbal    Consent given by:  Patient    Risks, benefits, and alternatives were discussed: yes      Risks discussed:  Incomplete drainage, bleeding, infection, poor cosmetic result and pain    Alternatives discussed:  No treatment, delayed treatment and alternative treatment  Universal protocol:     Procedure explained and questions answered to patient or proxy's satisfaction: yes      Relevant documents present and verified: yes      Imaging studies available: yes      Patient identity confirmed:  Verbally with patient  Sedation:     Sedation type:   None  Anesthesia:     Anesthesia method:  None  Procedure details:     Indications: open wounds      Wound location: Right elbow.    Wound age (days):  12    Wound surface area (sq cm):  4    Debridement performed: No    Skin layer closed with:     Wound care performed: Wound irrigated with sterile saline and Hibiclens.  Dressing:     Dressing: Bacitracin and then a silicone foam Tegaderm dressing.  Post-procedure details:     Procedure completion:  Tolerated well, no immediate complications      ED Course:         Lab Results (last 24 hours)       Procedure Component Value Units Date/Time    CBC & Differential [[id number]]  (Normal) Collected: 06/29/24 1918    Specimen: Blood Updated: 06/29/24 1927    Narrative:      The following orders were created for panel order CBC & Differential.  Procedure                               Abnormality         Status                     ---------                               -----------         ------                     CBC Auto Differential[[id number]]        Normal              Final result                 Please view results for these tests on the individual orders.    Comprehensive Metabolic Panel [[id number]]  (Abnormal) Collected: 06/29/24 1918    Specimen: Blood Updated: 06/29/24 1943     Glucose 205 mg/dL      BUN 23 mg/dL      Creatinine 0.91 mg/dL      Sodium 139 mmol/L      Potassium 3.4 mmol/L      Chloride 100 mmol/L      CO2 27.5 mmol/L      Calcium 8.8 mg/dL      Total Protein 6.9 g/dL      Albumin 3.9 g/dL      ALT (SGPT) 38 U/L      AST (SGOT) 35 U/L      Alkaline Phosphatase 73 U/L      Total Bilirubin 0.6 mg/dL      Globulin 3.0 gm/dL      A/G Ratio 1.3 g/dL      BUN/Creatinine Ratio 25.3     Anion Gap 11.5 mmol/L      eGFR 89.0 mL/min/1.73     Narrative:      GFR Normal >60  Chronic Kidney Disease <60  Kidney Failure <15    The GFR formula is only valid for adults with stable renal function between ages 18 and 70.    CBC Auto Differential [[id number]]  (Normal)  Collected: 06/29/24 1918    Specimen: Blood Updated: 06/29/24 1927     WBC 7.94 10*3/mm3      RBC 4.45 10*6/mm3      Hemoglobin 13.4 g/dL      Hematocrit 38.9 %      MCV 87.4 fL      MCH 30.1 pg      MCHC 34.4 g/dL      RDW 13.6 %      RDW-SD 44.3 fl      MPV 9.7 fL      Platelets 285 10*3/mm3      Neutrophil % 71.5 %      Lymphocyte % 20.0 %      Monocyte % 6.5 %      Eosinophil % 1.5 %      Basophil % 0.4 %      Immature Grans % 0.1 %      Neutrophils, Absolute 5.67 10*3/mm3      Lymphocytes, Absolute 1.59 10*3/mm3      Monocytes, Absolute 0.52 10*3/mm3      Eosinophils, Absolute 0.12 10*3/mm3      Basophils, Absolute 0.03 10*3/mm3      Immature Grans, Absolute 0.01 10*3/mm3     Wound Culture - Wound, Elbow, Right [[id number]] Collected: 06/29/24 1955    Specimen: Wound from Elbow, Right Updated: 06/29/24 1959             XR Elbow 3+ View Right    Result Date: 6/29/2024  XR ELBOW 3+ VW RIGHT Date of Exam: 6/29/2024 7:16 PM EDT Indication: Wound to elbow Comparison: Right elbow radiographs dated 6/17/2024 Findings: There is a soft tissue wound overlying the olecranon of the elbow. There is no evidence of osseous erosion to suggest radiographic evidence of osteomyelitis. There is no joint effusion. The radiocapitellar and ulnohumeral joints appear normal.     Impression: Impression: 1. Soft tissue injury overlying the olecranon process of the proximal ulna. No underlying osseous erosion to suggest radiographic evidence of osteomyelitis. 2. No evidence of acute fracture or joint effusion. Electronically Signed: Austin Glass  6/29/2024 7:32 PM EDT  Workstation ID: [id number]        [facility]     Amount and/or Complexity of Data Reviewed  Clinical lab tests: reviewed  Tests in the radiology section of CPT®: reviewed    Initial impression of presenting illness: Right elbow wound dehiscence    DDX: includes but is not limited to: Bursitis versus osteomyelitis versus wound infection versus wound dehiscence versus  cellulitis    Patient arrives via EMS with nonactionable vitals interpreted by myself.     Pertinent features from physical exam: Light yellow drainage from open wound to right elbow concerning for bursitis.  It is not warm to the touch.    Initial diagnostic plan: CBC, CMP, x-ray wound care    Results from initial plan were reviewed and interpreted by me revealing soft tissue injury on x-ray.    Diagnostic information from other sources: Medical records.    Interventions / Re-evaluation: I washed out the patient's wound with Hibiclens and sterile saline.  There patient was placed topically over the wound after wound culture was obtained.  Then a silicone foam dressing was placed topically.    Medications   sodium chloride 0.9 % flush 10 mL (has no administration in time range)   bacitracin ointment 0.9 g (0.9 g Topical Given 6/29/24 2106)   potassium chloride (MICRO-K/KLOR-CON) CR capsule (20 mEq Oral Given 6/29/24 2036)       Results/clinical rationale were discussed with Dr. Desai    Consultations/Discussion of results with other physicians: Dr. Desai    Data interpreted: Nursing notes reviewed, vital signs reviewed.  Labs independently interpreted by me (CBC, CMP).  Imaging independently interpreted by me (x-ray).      Patient is a 73-year-old male presents the emergency department today with concerns of wound dehiscence to his right elbow.  Patient fell on his right elbow on June 17 and had sutures placed.  Patient had the sutures removed after 7 days when he had a procedure performed on his back.  Patient is currently staying at the [facility] and receiving excellent care.  Patient had wound care today and they were concerned about his elbow wound.  Patient's elbow wound has dehisced and has active light yellow drainage.  There is no evidence of cellulitis at this time.  There is no evidence of osteomyelitis on x-ray.  Patient's white blood cell count was normal on CBC.  Patient's CMP showed no  actionable abnormalities with the exception of his potassium at 3.4.  Patient was given 20 mill equivalents of p.o. potassium in the emergency department.  Patient's wound was cleansed and washed out with Hibiclens and sterile saline.  Wound culture was obtained.  Patient had mupirocin placed topically and a dressing over top.  Patient was informed to have the wound to change daily and evaluated by the staff at the [facility].  Patient will be discharged back to the [facility] in stable condition at this time.  Patient informed to avoid any type of contaminated water sources until the wound heals.  Patient informed to avoid bathtubs, hot tubs.  Patient was informed concerning symptoms that require immediate return to emergency department.    Counseling: Discussed the results above with the patient regarding need for discharge.  Patient understands and agrees plan of care.      -----  ED Disposition       ED Disposition   Discharge    Condition   Stable    Comment   --             Final diagnoses:   Bursitis of right elbow, unspecified bursa   Wound dehiscence      Your Follow-Up Providers       Jim Lopez MD. Schedule an appointment as soon as possible for a visit in 1 week.    Specialty: Family Medicine  [address] [place] [address]  [phone]                       Contact information for after-discharge care    Follow-up information has not been specified.                    Your medication list        START taking these medications        Instructions Last Dose Given Next Dose Due   mupirocin 2 % cream  Commonly known as: BACTROBAN      Apply 1 Application topically to the appropriate area as directed 3 (Three) Times a Day.              CONTINUE taking these medications        Instructions Last Dose Given Next Dose Due   acetaminophen 500 MG tablet  Commonly known as: TYLENOL      Take 2 tablets by mouth Every 6 (Six) Hours As Needed for Mild Pain.       acetaminophen 650 MG 8 hr  tablet  Commonly known as: TYLENOL      Take 1 tablet by mouth Every 8 (Eight) Hours As Needed for Mild Pain.       ascorbic acid 500 MG tablet  Commonly known as: VITAMIN C      Take 1 tablet by mouth Daily.       diclofenac-miSOPROStol 75-0.2 MG EC tablet  Commonly known as: ARTHROTEC 75      Take 1 tablet by mouth 2 (Two) Times a Day.       hydroCHLOROthiazide 12.5 MG capsule  Commonly known as: MICROZIDE      Take 1 capsule by mouth Daily.       losartan 50 MG tablet  Commonly known as: COZAAR      Take 1 tablet by mouth Every Morning.       lovastatin 40 MG tablet  Commonly known as: MEVACOR      Take 1 tablet by mouth Every Night. for cholesterol       metroNIDAZOLE 1 % gel  Commonly known as: METROGEL      Apply 1 Application topically to the appropriate area as directed Daily.       psyllium 28.3 % pack pack  Commonly known as: KONSYL      Take 1 packet by mouth Daily.       tamsulosin 0.4 MG capsule 24 hr capsule  Commonly known as: FLOMAX      Take 1 capsule by mouth Every Night.       Undecylenic Acid 25 % liquid      Apply 1 dose topically Every Night.                 Where to Get Your Medications        These medications were sent to [address] [facility] - [phone]  - [phone]   [address] [facility] [address]      Phone: [phone]   mupirocin 2 % cream

## 2024-06-30 LAB — GRAM STN SPEC: NORMAL

## 2024-06-30 NOTE — DISCHARGE INSTRUCTIONS
You are to have the dressings changed on your elbow wound daily.  I recommend a hydrocolloid dressing daily and have mupirocin placed on the elbow wound daily.  Instructions for wound care: Flush the elbow wound with sterile saline.  Then apply mupirocin ointment topically.  Then apply a hydrocolloid dressing.  Do this daily.

## 2024-07-02 ENCOUNTER — HOME CARE VISIT (OUTPATIENT)
Dept: HOME HEALTH SERVICES | Facility: HOME HEALTHCARE | Age: 74
End: 2024-07-02
Payer: MEDICARE

## 2024-07-02 VITALS
TEMPERATURE: 97.7 F | OXYGEN SATURATION: 98 % | HEART RATE: 86 BPM | DIASTOLIC BLOOD PRESSURE: 73 MMHG | SYSTOLIC BLOOD PRESSURE: 115 MMHG

## 2024-07-02 PROCEDURE — G0152 HHCP-SERV OF OT,EA 15 MIN: HCPCS

## 2024-07-02 NOTE — Clinical Note
Eval Note:  Patient states the week before his back surgery, he fell in his garage, causing injury to his R elbow. He is R hand dominant. Prior to staying at the [facility], he was indep with performance of all functional mobility tasks, amb with st cane and RW prn, was driving, hx of frequent falls. He states he has a history of falls following a cervical laminectomy in 2021. He is staying for 2 weeks at [facility].      Reason for Hosp/Primary Dx/Co-morbidities: Patient demo deficits in standing balance, ambulation distance/technique, bilateral LE strength, elevated falls risk secondary to recent back surgery and dx of encounter for orthopedic aftercare.     Patient's goal(s):  Pt states desire to complete dressing, toileting, bathing independently.    Services required to achieve goals: Skilled OT services are needed to improve energy conservation, safety and participation with functional mobility and transfers, ADL retraining, therapeutic exercises, and therapeutic activities to improve dynamic standing balance.    Potential Issues for goal attainment: Pain and generalized weakness.    Problems identified: Decreased functional mobility, decreased transfer independence, decreased ADL participation, decreased energy.    Describe the Functional status and safety: Pt requiring assist for LB ADL activity and has bilateral LE weakness.    Describe any environmental issues:  None noted    Any equipment needs: OT provided dressing stick and reacher, OT to provide frame for toilet and sock-aid    POC confirmed with Pt on 7/2    Pt is currently limited to home activity due to limited mobility/weakness/poor balance leading to taxing effort to leave home.    OT educated pt on fall prevention strategies and safety within the home.

## 2024-07-02 NOTE — HOME HEALTH
Eval Note:  Patient states the week before his back surgery, he fell in his garage, causing injury to his R elbow. He is R hand dominant. Prior to staying at the [facility], he was indep with performance of all functional mobility tasks, amb with st cane and RW prn, was driving, hx of frequent falls. He states he has a history of falls following a cervical laminectomy in 2021. He is staying for 2 weeks at [facility].      Reason for Hosp/Primary Dx/Co-morbidities: Patient demo deficits in standing balance, ambulation distance/technique, bilateral LE strength, elevated falls risk secondary to recent back surgery and dx of encounter for orthopedic aftercare.     Patient's goal(s):  Pt states desire to complete dressing, toileting, bathing independently.    Services required to achieve goals: Skilled OT services are needed to improve energy conservation, safety and participation with functional mobility and transfers, ADL retraining, therapeutic exercises, and therapeutic activities to improve dynamic standing balance.    Potential Issues for goal attainment: Pain and generalized weakness.    Problems identified: Decreased functional mobility, decreased transfer independence, decreased ADL participation, decreased energy.    Describe the Functional status and safety: Pt requiring assist for LB ADL activity and has bilateral LE weakness.    Describe any environmental issues:  None noted    Any equipment needs: OT provided dressing stick and reacher, OT to provide frame for toilet and sock-aid    POC confirmed with Pt on 7/2    Pt is currently limited to home activity due to limited mobility/weakness/poor balance leading to taxing effort to leave home.    OT educated pt on fall prevention strategies and safety within the home.

## 2024-07-03 ENCOUNTER — HOME CARE VISIT (OUTPATIENT)
Dept: HOME HEALTH SERVICES | Facility: HOME HEALTHCARE | Age: 74
End: 2024-07-03
Payer: MEDICARE

## 2024-07-03 VITALS
TEMPERATURE: 97.6 F | HEART RATE: 92 BPM | RESPIRATION RATE: 17 BRPM | OXYGEN SATURATION: 98 % | DIASTOLIC BLOOD PRESSURE: 72 MMHG | SYSTOLIC BLOOD PRESSURE: 116 MMHG

## 2024-07-03 PROCEDURE — G0157 HHC PT ASSISTANT EA 15: HCPCS

## 2024-07-04 LAB
BACTERIA SPEC AEROBE CULT: NORMAL
GRAM STN SPEC: NORMAL

## 2024-07-05 ENCOUNTER — HOME CARE VISIT (OUTPATIENT)
Dept: HOME HEALTH SERVICES | Facility: HOME HEALTHCARE | Age: 74
End: 2024-07-05
Payer: MEDICARE

## 2024-07-05 VITALS — HEART RATE: 79 BPM | DIASTOLIC BLOOD PRESSURE: 77 MMHG | TEMPERATURE: 97.6 F | SYSTOLIC BLOOD PRESSURE: 127 MMHG

## 2024-07-05 PROCEDURE — G0157 HHC PT ASSISTANT EA 15: HCPCS

## 2024-07-08 ENCOUNTER — HOME CARE VISIT (OUTPATIENT)
Dept: HOME HEALTH SERVICES | Facility: HOME HEALTHCARE | Age: 74
End: 2024-07-08
Payer: MEDICARE

## 2024-07-08 VITALS
HEART RATE: 86 BPM | OXYGEN SATURATION: 98 % | TEMPERATURE: 97.4 F | SYSTOLIC BLOOD PRESSURE: 137 MMHG | DIASTOLIC BLOOD PRESSURE: 87 MMHG

## 2024-07-08 PROCEDURE — G0152 HHCP-SERV OF OT,EA 15 MIN: HCPCS

## 2024-07-08 NOTE — HOME HEALTH
Pt participated in sit to stand activity with CGA and RW, ambulated household distance X4 with Leandra and RW. Pt completed toilet transfer Leandra and max verbal cueing. Pt completed UE therapeutic exercises including elbow flexion/extension, shoulder flexion/extension, shoulder abduction/adduction X15 reps. Pt without resting flexed posture of R elbow. OT educated on completing full ROM exercises to prevent scar tissue formation and ROM deficits. Pt completed bean bag toss activity from standing with CGA for standing balance X15 reps X2 sets.  Pt then completed box tap activity X2 minute X2 reps with Leandra and RW for balance and 1 minute X2 reps without RW and maxA for balance. Pt with better gait this date and observed knee buckling at times.

## 2024-07-09 ENCOUNTER — HOME CARE VISIT (OUTPATIENT)
Dept: HOME HEALTH SERVICES | Facility: HOME HEALTHCARE | Age: 74
End: 2024-07-09
Payer: MEDICARE

## 2024-07-09 VITALS
OXYGEN SATURATION: 98 % | SYSTOLIC BLOOD PRESSURE: 123 MMHG | DIASTOLIC BLOOD PRESSURE: 84 MMHG | TEMPERATURE: 97.6 F | HEART RATE: 94 BPM | RESPIRATION RATE: 16 BRPM

## 2024-07-09 PROCEDURE — G0157 HHC PT ASSISTANT EA 15: HCPCS

## 2024-07-12 ENCOUNTER — HOME CARE VISIT (OUTPATIENT)
Dept: HOME HEALTH SERVICES | Facility: HOME HEALTHCARE | Age: 74
End: 2024-07-12
Payer: MEDICARE

## 2024-07-17 ENCOUNTER — OFFICE VISIT (OUTPATIENT)
Dept: NEUROSURGERY | Facility: CLINIC | Age: 74
End: 2024-07-17
Payer: MEDICARE

## 2024-07-17 VITALS — TEMPERATURE: 98.2 F | HEIGHT: 69 IN | WEIGHT: 166 LBS | BODY MASS INDEX: 24.59 KG/M2

## 2024-07-17 DIAGNOSIS — M48.062 SPINAL STENOSIS, LUMBAR REGION, WITH NEUROGENIC CLAUDICATION: Primary | ICD-10-CM

## 2024-07-17 DIAGNOSIS — R26.9 GAIT ABNORMALITY: ICD-10-CM

## 2024-07-17 PROCEDURE — 1159F MED LIST DOCD IN RCRD: CPT

## 2024-07-17 PROCEDURE — 1160F RVW MEDS BY RX/DR IN RCRD: CPT

## 2024-07-17 PROCEDURE — 99024 POSTOP FOLLOW-UP VISIT: CPT

## 2024-07-17 NOTE — PROGRESS NOTES
Patient: Vladimir Haq  : 1950  Chart #: [id number]    Date of Service: 2024    Chief Complaint   Patient presents with   • Post-op       HPI  ***    Chronic Illnesses:    Past Medical History:   Diagnosis Date   • Arthritis    • Balance problem     since neck surgery in    • Cancer     Basal cell skin cancer   • Cervical disc disorder    • History of COVID-19 2024    took Paxlovid; no hospitalization required   • History of recent fall 2024    lost balance and fell in garage when getting into car to come to [facility] in preparation for back surgery; large cut to left elbow   • Hyperlipidemia    • Hypertension    • Pre-diabetes    • Rosacea    • Wears glasses          Past Surgical History:   Procedure Laterality Date   • CERVICAL DISC SURGERY      Dr. Robbins, [facility]    • CERVICAL DISCECTOMY POSTERIOR FUSION WITH BRAIN LAB N/A 2021    Procedure: POSTERIOR CERVICAL FUSION VERTEX SYSTEM, LAMINECTOMIES & FUSION C3-7;  Surgeon: Osmani Mckeon MD;  Location: [facility];  Service: Neurosurgery;  Laterality: N/A;   • COLONOSCOPY     • HIP ARTHROPLASTY Right 2018    Dr. Kimble   • LUMBAR LAMINECTOMY DISCECTOMY DECOMPRESSION N/A 2024    Procedure: Lumbar decompressive laminectomies at L3-4 and L4-5 with discectomy at the L3-4;  Surgeon: Osmani Mckeon MD;  Location: [facility] OR;  Service: Neurosurgery;  Laterality: N/A;   • NECK SURGERY      Cervecal lamindectomy   • TONSILLECTOMY         No Known Allergies      Current Outpatient Medications:   •  hydroCHLOROthiazide (MICROZIDE) 12.5 MG capsule, Take 1 capsule by mouth Daily., Disp: , Rfl:   •  losartan (COZAAR) 50 MG tablet, Take 1 tablet by mouth Every Morning., Disp: , Rfl:   •  lovastatin (MEVACOR) 40 MG tablet, Take 1 tablet by mouth Every Night. for cholesterol, Disp: , Rfl:   •  metroNIDAZOLE (METROGEL) 1 % gel, Apply 1 Application topically to the appropriate area as  directed Daily., Disp: , Rfl:   •  psyllium (KONSYL) 28.3 % pack pack, Take 1 packet by mouth Daily., Disp: , Rfl:   •  tamsulosin (FLOMAX) 0.4 MG capsule 24 hr capsule, Take 1 capsule by mouth Every Night., Disp: , Rfl:   •  Undecylenic Acid 25 % liquid, Apply 1 dose topically Every Night., Disp: , Rfl:     Social History     Socioeconomic History   • Marital status:    Tobacco Use   • Smoking status: Never     Passive exposure: Never   • Smokeless tobacco: Never   Vaping Use   • Vaping status: Never Used   Substance and Sexual Activity   • Alcohol use: Yes     Alcohol/week: 7.0 standard drinks of alcohol     Types: 7 Cans of beer per week     Comment: 7 drinks per day   • Drug use: Never   • Sexual activity: Defer       Family History   Problem Relation Age of Onset   • Cancer Mother    • Anxiety disorder Mother    • Early death Mother    • Stroke Father        BMI is within normal parameters. No other follow-up for BMI required.       Social History    Tobacco Use      Smoking status: Never        Passive exposure: Never      Smokeless tobacco: Never       Review of Systems   Constitutional:  Negative for activity change, appetite change, chills, diaphoresis, fatigue, fever and unexpected weight change.   HENT:  Negative for congestion, dental problem, drooling, ear discharge, ear pain, facial swelling, hearing loss, mouth sores, nosebleeds, postnasal drip, rhinorrhea, sinus pressure, sneezing, sore throat, tinnitus, trouble swallowing and voice change.    Eyes:  Negative for photophobia, pain, discharge, redness, itching and visual disturbance.   Respiratory:  Negative for apnea, cough, choking, chest tightness, shortness of breath, wheezing and stridor.    Cardiovascular:  Negative for chest pain, palpitations and leg swelling.   Gastrointestinal:  Negative for abdominal distention, abdominal pain, anal bleeding, blood in stool, constipation, diarrhea, nausea, rectal pain and vomiting.   Endocrine:  Negative for cold intolerance, heat intolerance, polydipsia, polyphagia and polyuria.   Genitourinary:  Negative for decreased urine volume, difficulty urinating, dysuria, enuresis, flank pain, frequency, genital sores, hematuria, penile discharge, penile pain, penile swelling, scrotal swelling, testicular pain and urgency.   Musculoskeletal:  Negative for arthralgias, back pain, gait problem, joint swelling, myalgias, neck pain and neck stiffness.   Skin:  Negative for color change, pallor, rash and wound.   Allergic/Immunologic: Negative for environmental allergies, food allergies and immunocompromised state.   Neurological:  Negative for dizziness, tremors, seizures, syncope, facial asymmetry, speech difficulty, weakness, light-headedness, numbness and headaches.   Hematological:  Negative for adenopathy. Does not bruise/bleed easily.   Psychiatric/Behavioral:  Negative for agitation, behavioral problems, confusion, decreased concentration, dysphoric mood, hallucinations, self-injury, sleep disturbance and suicidal ideas. The patient is not nervous/anxious and is not hyperactive.    All other systems reviewed and are negative.     Gait & Balance Assessment:  Risk assessment for falls. Fall precautions:  such as;   Using gait aids a cane, walker at the appropriate height at all times for ambulation or if necessary a wheelchair  Removing all area rugs and coffee tables to create a safe environment at home  Ensure clean, dry floors  Wearing supportive footwear and properly fitting clothing  Ensure bed/chair is appropriate height and patient's feet can touch the floor  Using a shower transfer bench  Using walk-in shower and having shower safety bars installed  Ensure proper lighting, minimize glare  Have nightlights operational and in use  Participation in an exercise program for gait training, balance training and strength  Avoid carrying laundry up and down steps  Ensure proper compliance and organization of  "medications to avoid errors   Avoid use of over the counter sedatives and alcohol consumption  Ensure easy access to call bell, glasses, TV control, telephone  Ensure glasses/hearing aids are in use or close by (on top of night table)     Physical examination:  Temperature 98.2 °F (36.8 °C), temperature source Temporal, height 175.3 cm (69\"), weight 75.3 kg (166 lb).  HEENT- normocephalic, atraumatic, sclera clear  Lungs-normal expansion, no wheezing  Heart-regular rate and rhythm  Extremities-positive pulses, no edema    Neurologic Exam  WDWN***  A/A/C, speech clear, attention normal, conversant, answers questions appropriately, good historian.  Cranial nerves II through XII are intact.  Motor examination does not reveal weakness in the , upper or lower extremities.   Sensation is intact.  Gait is normal, balance is normal.   No tremors are noted.  Reflexes are intact.   Renee is negative. Clonus is negative.   Palpation ***    Radiographic Imaging:  For my review ***    Medical Decision Making  Diagnoses and all orders for this visit:    1. Spinal stenosis, lumbar region, with neurogenic claudication (Primary)    2. Gait abnormality         Any copied data from previous notes included in the (1) HPI, (2) PE, (3) MDM and/or assessment and plan has been reviewed and is accurate as of this day.    Andria Weinberg PA-C     Patient Care Team:  Jim Lopez MD as PCP - General  Jim Lopez MD as PCP - Family Medicine  Yonny Blair MD as Referring Physician (Neurology)  Osmani Mckeon MD as Consulting Physician (Neurosurgery)          "

## 2024-07-17 NOTE — PROGRESS NOTES
Patient: Vladimir Haq  : 1950  Chart #: [id number]    Date of Service: 2024    CHIEF COMPLAINT:   Lumbar stenosis with neurogenic claudication  2.    L3-4 disc herniation    History of Present Illness Mr. Haq is a 73-year-old gentleman who is known to our service.  Remotely he underwent ACDF by Dr. Robbins.  In , Dr. Mckeon performed C3-7 decompression with fusion and stabilization for pronounced myelopathy.  More recently he presented with progressive back and lower extremity pain with walking and standing intolerance.  Preoperative studies demonstrated generous lumbar spinal stenosis due to facet ligamentous arthropathy as well as large central disc protrusion at L3-4.  As such on 2024 he presented for decompressive laminectomies at L3-4 and L4-5 with right L3-4 discectomy. He went to a self-pay rehabilitation facility after he discharged from the hospital and is now home.     Today, Mr. Haq presents for a postoperative incision check. He reports that he is pain free and taking no medication for his back or leg pain. He does feel that sometimes he feels as if his feet are stuck to the floor and it makes it difficult to ambulate. He has mild numbness in his right 3rd finger that he attributes to leaning on a walker and poor neck posture. He denies fever, chills, or drainage from his incision. He is working with home health physical therapy and occupational therapy this week. His significant other reports that he is stiff with a lot of movement.    The patient's past medical history, past surgical history, family history, and social history have been reviewed at length in the electronic medical record.     Review of Systems   Constitutional:  Negative for activity change, appetite change, chills, diaphoresis, fatigue, fever and unexpected weight change.   HENT:  Negative for congestion, dental problem, drooling, ear discharge, ear pain, facial swelling, hearing loss, mouth  "sores, nosebleeds, postnasal drip, rhinorrhea, sinus pressure, sneezing, sore throat, tinnitus, trouble swallowing and voice change.    Eyes:  Negative for photophobia, pain, discharge, redness, itching and visual disturbance.   Respiratory:  Negative for apnea, cough, choking, chest tightness, shortness of breath, wheezing and stridor.    Cardiovascular:  Negative for chest pain, palpitations and leg swelling.   Gastrointestinal:  Negative for abdominal distention, abdominal pain, anal bleeding, blood in stool, constipation, diarrhea, nausea, rectal pain and vomiting.   Endocrine: Negative for cold intolerance, heat intolerance, polydipsia, polyphagia and polyuria.   Genitourinary:  Negative for decreased urine volume, difficulty urinating, dysuria, enuresis, flank pain, frequency, genital sores, hematuria, penile discharge, penile pain, penile swelling, scrotal swelling, testicular pain and urgency.   Musculoskeletal:  Negative for arthralgias, back pain, gait problem, joint swelling, myalgias, neck pain and neck stiffness.   Skin:  Negative for color change, pallor, rash and wound.   Allergic/Immunologic: Negative for environmental allergies, food allergies and immunocompromised state.   Neurological:  Negative for dizziness, tremors, seizures, syncope, facial asymmetry, speech difficulty, weakness, light-headedness, numbness and headaches.   Hematological:  Negative for adenopathy. Does not bruise/bleed easily.   Psychiatric/Behavioral:  Negative for agitation, behavioral problems, confusion, decreased concentration, dysphoric mood, hallucinations, self-injury, sleep disturbance and suicidal ideas. The patient is not nervous/anxious and is not hyperactive.    All other systems reviewed and are negative.     Objective   Vital Signs: Temperature 98.2 °F (36.8 °C), temperature source Temporal, height 175.3 cm (69\"), weight 75.3 kg (166 lb).    Physical Exam:     Constitutional: The patient is well-developed, " well-nourished. He is pleasant and is in no acute distress.     Dermatological: The patient's lumbar incision is clean, dry, and without evidence of erythema or induration. I removed 2 remaining SteriStrips at the north pole of the incision.     Musculoskeletal:   The patient generally ambulates slowly and has some stiffness with moving about.   Weakness is not appreciated on exam of the lower extremities.     Neurological:  A/A/C, speech clear, attention normal, conversant, answers questions appropriately, good historian.  Sensation is intact.  No tremors are noted.      Assessment & Plan   Diagnosis:  1.  Lumbar stenosis with neurogenic claudication status post laminectomies L3-4 and L4-5 with discectomy on the right at L3-4    Medical Decision Making:     Diagnoses and all orders for this visit:    1. Spinal stenosis, lumbar region, with neurogenic claudication (Primary)    2. Gait abnormality           Mr. Haq is very pleased with the resolution of his pre-operative pain. He is still a little stiff in his back and with moving about, but this appears to be something that gradually increases throughout the day in the setting of fatigue. We discussed slowly returning to his activities and I encouraged him to work with home health therapy. He will follow up with Dr. Mckeon in 6 weeks to assess progress. He may reach out to our office with questions or with new symptoms in the interim.       Andria Weinberg PA-C  Patient Care Team:  Jim Lopez MD as PCP - General  Jim Lopez MD as PCP - Family Medicine  Yonny Blair MD as Referring Physician (Neurology)  Osmani Mckeon MD as Consulting Physician (Neurosurgery)                Answers submitted by the patient for this visit:  Primary Reason for Visit (Submitted on 7/16/2024)  What is the primary reason for your visit?: Other  Other (Submitted on 7/16/2024)  Please describe your symptoms.: I’m pain free, the surgery that  Dr Grier performed on me for sciatica pain is gone. He did a lamendectomy and a discectomy to correct the stenosis and herniated I had in the lumbar section.  Have you had these symptoms before?: Yes  How long have you been having these symptoms?: Greater than 2 weeks

## 2024-07-18 ENCOUNTER — HOME CARE VISIT (OUTPATIENT)
Dept: HOME HEALTH SERVICES | Facility: HOME HEALTHCARE | Age: 74
End: 2024-07-18
Payer: MEDICARE

## 2024-07-18 PROCEDURE — G0151 HHCP-SERV OF PT,EA 15 MIN: HCPCS

## 2024-07-19 ENCOUNTER — HOME CARE VISIT (OUTPATIENT)
Dept: HOME HEALTH SERVICES | Facility: HOME HEALTHCARE | Age: 74
End: 2024-07-19
Payer: MEDICARE

## 2024-07-19 VITALS
OXYGEN SATURATION: 97 % | TEMPERATURE: 97.2 F | SYSTOLIC BLOOD PRESSURE: 133 MMHG | DIASTOLIC BLOOD PRESSURE: 87 MMHG | HEART RATE: 91 BPM

## 2024-07-19 PROCEDURE — G0152 HHCP-SERV OF OT,EA 15 MIN: HCPCS

## 2024-07-20 VITALS
HEART RATE: 71 BPM | DIASTOLIC BLOOD PRESSURE: 74 MMHG | RESPIRATION RATE: 17 BRPM | OXYGEN SATURATION: 98 % | TEMPERATURE: 97.9 F | SYSTOLIC BLOOD PRESSURE: 138 MMHG

## 2024-07-20 NOTE — HOME HEALTH
Pt participated in sit to stand activity with SBA and RW, ambulated household distance X2 with CGA and RW. Pt completed toilet transfer Leandra and max verbal cueing. Pt completed UE therapeutic exercises including elbow flexion/extension, shoulder flexion/extension, shoulder abduction/adduction X15 reps. OT educated on completing full ROM exercises to prevent scar tissue formation and ROM deficits. Pt completed bean bag toss activity from standing with CGA for standing balance X15 reps X2 sets. Pt with one posterior lean with activity requiring modA.  Pt then completed box tap activity X2 minute X2 reps with Leandra and RW for balance and 1 minute X2 reps without RW and maxA for balance. Pt with better gait this date and observed knee buckling at times.

## 2024-07-21 NOTE — CASE COMMUNICATION
Patient missed a PT reassessment visit from [facility] on 7/12/2024.    Reason: Patient declines visit this date secondary to unavailable with multiple appointments.       For your records only.   Per CMS Guidance, MD must be notified of missed/cancelled visits; therefore the prescribed frequency was not met.

## 2024-07-22 ENCOUNTER — HOME CARE VISIT (OUTPATIENT)
Dept: HOME HEALTH SERVICES | Facility: HOME HEALTHCARE | Age: 74
End: 2024-07-22
Payer: MEDICARE

## 2024-07-22 VITALS
TEMPERATURE: 97.7 F | OXYGEN SATURATION: 97 % | SYSTOLIC BLOOD PRESSURE: 134 MMHG | HEART RATE: 82 BPM | DIASTOLIC BLOOD PRESSURE: 79 MMHG

## 2024-07-22 PROCEDURE — G0152 HHCP-SERV OF OT,EA 15 MIN: HCPCS

## 2024-07-22 NOTE — HOME HEALTH
Pt participated in sit to stand activity with SBA and RW, ambulated household distance X2 with CGA and RW. Pt completed UE therapeutic exercises including elbow flexion/extension, shoulder flexion/extension, shoulder abduction/adduction X15 reps. Pt completed box tap activity X2 minute X2 reps with Leandra and RW for balance and 1 minute X2 reps without RW and maxA for balance. Pt with second reported fall on 7/21 unwitnessed.  OT demonstrated safe turning with RW while in kitchen, pt demonstrated activity safely.  OT suggested pt place most used items in kitchen in 1 area to prevent need to turn frequently which leads to poor balance and potential falls.

## 2024-07-22 NOTE — Clinical Note
Pt reporting second fall on 7/22 in kitchen.  Pt with a small laceration above R eye secondary to fall.  OT discussed safety in kitchen.  PT to continue balance training as well.

## 2024-07-26 ENCOUNTER — HOME CARE VISIT (OUTPATIENT)
Dept: HOME HEALTH SERVICES | Facility: HOME HEALTHCARE | Age: 74
End: 2024-07-26
Payer: MEDICARE

## 2024-07-26 VITALS
TEMPERATURE: 97.6 F | HEART RATE: 70 BPM | SYSTOLIC BLOOD PRESSURE: 134 MMHG | RESPIRATION RATE: 16 BRPM | OXYGEN SATURATION: 97 % | DIASTOLIC BLOOD PRESSURE: 87 MMHG

## 2024-07-26 PROCEDURE — G0157 HHC PT ASSISTANT EA 15: HCPCS

## 2024-07-31 ENCOUNTER — HOME CARE VISIT (OUTPATIENT)
Dept: HOME HEALTH SERVICES | Facility: HOME HEALTHCARE | Age: 74
End: 2024-07-31
Payer: MEDICARE

## 2024-07-31 VITALS
SYSTOLIC BLOOD PRESSURE: 120 MMHG | DIASTOLIC BLOOD PRESSURE: 75 MMHG | RESPIRATION RATE: 16 BRPM | HEART RATE: 68 BPM | TEMPERATURE: 97.4 F | OXYGEN SATURATION: 98 %

## 2024-07-31 PROCEDURE — G0157 HHC PT ASSISTANT EA 15: HCPCS

## 2024-08-02 ENCOUNTER — HOME CARE VISIT (OUTPATIENT)
Dept: HOME HEALTH SERVICES | Facility: HOME HEALTHCARE | Age: 74
End: 2024-08-02
Payer: MEDICARE

## 2024-08-02 VITALS
SYSTOLIC BLOOD PRESSURE: 147 MMHG | DIASTOLIC BLOOD PRESSURE: 76 MMHG | HEART RATE: 85 BPM | OXYGEN SATURATION: 99 % | TEMPERATURE: 97.6 F

## 2024-08-02 VITALS
HEART RATE: 70 BPM | DIASTOLIC BLOOD PRESSURE: 65 MMHG | OXYGEN SATURATION: 98 % | TEMPERATURE: 97.5 F | RESPIRATION RATE: 16 BRPM | SYSTOLIC BLOOD PRESSURE: 135 MMHG

## 2024-08-02 PROCEDURE — G0152 HHCP-SERV OF OT,EA 15 MIN: HCPCS

## 2024-08-02 PROCEDURE — G0157 HHC PT ASSISTANT EA 15: HCPCS

## 2024-08-02 NOTE — Clinical Note
OT performed reassessment this date.  OT to continue skilled services due to recent falls during meal preparation activities.

## 2024-08-02 NOTE — HOME HEALTH
Pt participated in sit to stand activity with SBA and RW, ambulated household distance X2 with SBA and RW. Pt completed UE therapeutic exercises including elbow flexion/extension, shoulder flexion/extension, shoulder abduction/adduction X15 reps. Pt completed box tap activity X2 minute X1 rep with CGA and RW for balance and 1 minute X1 rep without RW and modA for balance. OT educated pt on home safety with RW and safety with mobility.  Pt reporting improved balance this date.

## 2024-08-05 ENCOUNTER — HOME CARE VISIT (OUTPATIENT)
Dept: HOME HEALTH SERVICES | Facility: HOME HEALTHCARE | Age: 74
End: 2024-08-05
Payer: MEDICARE

## 2024-08-05 VITALS
DIASTOLIC BLOOD PRESSURE: 87 MMHG | SYSTOLIC BLOOD PRESSURE: 136 MMHG | HEART RATE: 85 BPM | OXYGEN SATURATION: 98 % | TEMPERATURE: 97.4 F

## 2024-08-05 PROCEDURE — G0152 HHCP-SERV OF OT,EA 15 MIN: HCPCS

## 2024-08-05 NOTE — HOME HEALTH
Pt participated in sit to stand activity with SBA and RW, ambulated household distance X2 with SBA and RW. Pt completed item retrieval from cabinet and refirdgerator X5 reps with max cueing for safe positioning due to poor balance.  Pt then completed ambulation to garage down ramp with Leandra and RW as well as car transfer with Leandra along with placement of RW in back seat.  OT issued light theraband this date and educated pt on biceps flexion and triceps extension to be completed outside of therapy session.  Pt completed 20 reps of each exercise this date.  OT educated pt on home safety with RW and safety with mobility. Pt reporting improved balance this date and no falls.

## 2024-08-07 ENCOUNTER — HOME CARE VISIT (OUTPATIENT)
Dept: HOME HEALTH SERVICES | Facility: HOME HEALTHCARE | Age: 74
End: 2024-08-07
Payer: MEDICARE

## 2024-08-07 VITALS
SYSTOLIC BLOOD PRESSURE: 129 MMHG | DIASTOLIC BLOOD PRESSURE: 75 MMHG | HEART RATE: 103 BPM | TEMPERATURE: 97.6 F | OXYGEN SATURATION: 98 % | RESPIRATION RATE: 16 BRPM

## 2024-08-07 PROCEDURE — G0157 HHC PT ASSISTANT EA 15: HCPCS

## 2024-08-14 ENCOUNTER — HOME CARE VISIT (OUTPATIENT)
Dept: HOME HEALTH SERVICES | Facility: HOME HEALTHCARE | Age: 74
End: 2024-08-14
Payer: MEDICARE

## 2024-08-14 VITALS
SYSTOLIC BLOOD PRESSURE: 168 MMHG | DIASTOLIC BLOOD PRESSURE: 90 MMHG | TEMPERATURE: 97.6 F | OXYGEN SATURATION: 99 % | HEART RATE: 81 BPM

## 2024-08-14 PROCEDURE — G0152 HHCP-SERV OF OT,EA 15 MIN: HCPCS

## 2024-08-14 NOTE — Clinical Note
Discharge Summary/Summary of Care Provided: Pt received skilled OT services to improve dynamic balance and functional endurance/strength to improve ADL/IADL participation. OT issued theraband and FM HEP with pt completing outside of therapy session.  Pt with improved dynamic standing balance to good (+) with skilled intervention.  Pt independent with meal preparation and housekeeping.   Patient received home health for diagnosis: Laminectomy  Current level of functional ability: Independent with ADL/IADL activity  Living arrangements: 1-story home with significant other assist.  Ramp to enter.  Progress towards goals and/or Were all goals met? All goals were met  If not all goals met, barriers that prevented patient from meeting goals: No barriers present  SDOH concerns (i.e. Caregiver availability, social isolation, environment, income, transportation access, food insecurity etc.)-  None noted during therapy  Follow-up appointment plans and community resources provided: Pt to follow-up with Outpatient PT after D/C from PT    Other imporant information- None noted

## 2024-08-14 NOTE — HOME HEALTH
Pt participated in sit to stand activity Independently with RW, ambulated household distance X2 Independently with RW. Pt then completed ambulation to garage down ramp with SBA and RW as well as car transfer with SBA along with placement of RW in back seat. OT reviewed theraband HEP with pt with no concerns noted.  OT educated pt on home safety with RW and safety with mobility. Pt reporting improved balance this date and no falls.

## 2024-08-15 ENCOUNTER — HOME CARE VISIT (OUTPATIENT)
Dept: HOME HEALTH SERVICES | Facility: HOME HEALTHCARE | Age: 74
End: 2024-08-15
Payer: MEDICARE

## 2024-08-15 VITALS
RESPIRATION RATE: 15 BRPM | OXYGEN SATURATION: 94 % | SYSTOLIC BLOOD PRESSURE: 130 MMHG | HEART RATE: 71 BPM | DIASTOLIC BLOOD PRESSURE: 88 MMHG | TEMPERATURE: 98.5 F

## 2024-08-15 PROCEDURE — G0151 HHCP-SERV OF PT,EA 15 MIN: HCPCS

## 2024-08-28 ENCOUNTER — OFFICE VISIT (OUTPATIENT)
Dept: NEUROSURGERY | Facility: CLINIC | Age: 74
End: 2024-08-28
Payer: MEDICARE

## 2024-08-28 VITALS — BODY MASS INDEX: 24.56 KG/M2 | HEIGHT: 69 IN | TEMPERATURE: 98.2 F | WEIGHT: 165.8 LBS

## 2024-08-28 DIAGNOSIS — Z98.890 S/P LUMBAR LAMINECTOMY: ICD-10-CM

## 2024-08-28 DIAGNOSIS — M43.16 SPONDYLOLISTHESIS OF LUMBAR REGION: ICD-10-CM

## 2024-08-28 DIAGNOSIS — M48.062 SPINAL STENOSIS, LUMBAR REGION, WITH NEUROGENIC CLAUDICATION: Primary | ICD-10-CM

## 2024-08-28 PROCEDURE — 99024 POSTOP FOLLOW-UP VISIT: CPT | Performed by: NEUROLOGICAL SURGERY

## 2024-08-28 PROCEDURE — 1160F RVW MEDS BY RX/DR IN RCRD: CPT | Performed by: NEUROLOGICAL SURGERY

## 2024-08-28 PROCEDURE — 1159F MED LIST DOCD IN RCRD: CPT | Performed by: NEUROLOGICAL SURGERY

## 2024-08-28 NOTE — PROGRESS NOTES
Patient: Vladimir Haq  : 1950    Primary Care Provider: Jim Lopez MD    Requesting Provider: As above        History    Chief Complaint: Bilateral lower extremity pain with walking and standing intolerance.    History of Present Illness: Mr. Haq is a 73-year-old gentleman who is known to our service.  Remotely he underwent ACDF by Dr. Robbins.  In , Dr. Mckeon performed C3-7 decompression with fusion and stabilization for pronounced myelopathy.  More recently he presented with progressive back and lower extremity pain with walking and standing intolerance.  Preoperative studies demonstrated generous lumbar spinal stenosis due to facet ligamentous arthropathy as well as large central disc protrusion at L3-4.  As such on 2024 he presented for decompressive laminectomies at L3-4 and L4-5 with right L3-4 discectomy.  He was discharged to an inpatient rehab facility.  He has been home and is ready to start outpatient therapy soon.  Interest in getting around he may have regressed a little bit but according to his wife he is not always as regimented about his activity and therapy as he might be.  His radicular pain is absent.  He still dragging his right leg a bit..    Review of Systems   Constitutional:  Negative for activity change, appetite change, chills, diaphoresis, fatigue, fever and unexpected weight change.   HENT:  Negative for congestion, dental problem, drooling, ear discharge, ear pain, facial swelling, hearing loss, mouth sores, nosebleeds, postnasal drip, rhinorrhea, sinus pressure, sinus pain, sneezing, sore throat, tinnitus, trouble swallowing and voice change.    Eyes:  Negative for photophobia, pain, discharge, redness, itching and visual disturbance.   Respiratory:  Negative for apnea, cough, choking, chest tightness, shortness of breath, wheezing and stridor.    Cardiovascular:  Negative for chest pain, palpitations and leg swelling.   Gastrointestinal:   "Negative for abdominal distention, abdominal pain, anal bleeding, blood in stool, constipation, diarrhea, nausea, rectal pain and vomiting.   Endocrine: Negative for cold intolerance, heat intolerance, polydipsia, polyphagia and polyuria.   Genitourinary:  Negative for decreased urine volume, difficulty urinating, dysuria, enuresis, flank pain, frequency, genital sores, hematuria, penile discharge, penile pain, penile swelling, scrotal swelling, testicular pain and urgency.   Musculoskeletal:  Negative for arthralgias, back pain, gait problem, joint swelling, myalgias, neck pain and neck stiffness.   Skin:  Positive for wound. Negative for color change, pallor and rash.   Allergic/Immunologic: Negative for environmental allergies, food allergies and immunocompromised state.   Neurological:  Negative for dizziness, tremors, seizures, syncope, facial asymmetry, speech difficulty, weakness, light-headedness, numbness and headaches.   Hematological:  Negative for adenopathy. Does not bruise/bleed easily.   Psychiatric/Behavioral:  Negative for agitation, behavioral problems, confusion, decreased concentration, dysphoric mood, hallucinations, self-injury, sleep disturbance and suicidal ideas. The patient is not nervous/anxious and is not hyperactive.        The patient's past medical history, past surgical history, family history, and social history have been reviewed at length in the electronic medical record.      Physical Exam:   Temp 98.2 °F (36.8 °C) (Infrared)   Ht 175.3 cm (69\")   Wt 75.2 kg (165 lb 12.8 oz)   BMI 24.48 kg/m²   The patient ambulates as if he has a foot drop on the right side.  However when he sits down direct testing of motor groups in his right leg is completely normal.  Lumbar incision looks good.    Medical Decision Making      Diagnosis:   1.  Lumbar stenosis and disc herniation status post decompression.  2.  Remote cervical myelopathy.    Treatment Options:   The patient is improving.  He " will continue his outpatient PT.  He will follow-up in our clinic in a couple of months to check on his progress.    Scribed for Osmani Mckeon MD by Adela Reyes CMA on 8/28/2024 15:55 EDT         I, Dr. Mckeon, personally performed the services described in the documentation, as scribed in my presence, and it is both accurate and complete.

## 2024-10-19 ENCOUNTER — APPOINTMENT (OUTPATIENT)
Facility: HOSPITAL | Age: 74
End: 2024-10-19
Payer: MEDICARE

## 2024-10-19 ENCOUNTER — HOSPITAL ENCOUNTER (EMERGENCY)
Facility: HOSPITAL | Age: 74
Discharge: HOME | End: 2024-10-19
Attending: EMERGENCY MEDICINE
Payer: MEDICARE

## 2024-10-19 VITALS
OXYGEN SATURATION: 96 % | SYSTOLIC BLOOD PRESSURE: 154 MMHG | HEIGHT: 70 IN | HEART RATE: 99 BPM | WEIGHT: 165 LBS | TEMPERATURE: 98.2 F | DIASTOLIC BLOOD PRESSURE: 80 MMHG | RESPIRATION RATE: 16 BRPM | BODY MASS INDEX: 23.62 KG/M2
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
## 2024-10-19 DIAGNOSIS — S50.11XA CONTUSION OF RIGHT FOREARM, INITIAL ENCOUNTER: ICD-10-CM

## 2024-10-19 LAB
ALBUMIN SERPL-MCNC: 4.2 G/DL (ref 3.5–5.2)
ALBUMIN/GLOB SERPL: 1.6 G/DL
ALP SERPL-CCNC: 68 U/L (ref 39–117)
ALT SERPL W P-5'-P-CCNC: 10 U/L (ref 1–41)
ANION GAP SERPL CALCULATED.3IONS-SCNC: 13.1 MMOL/L (ref 5–15)
AST SERPL-CCNC: 19 U/L (ref 1–40)
BASOPHILS # BLD AUTO: 0.02 10*3/MM3 (ref 0–0.2)
BASOPHILS NFR BLD AUTO: 0.3 % (ref 0–1.5)
BILIRUB SERPL-MCNC: 1.5 MG/DL (ref 0–1.2)
BUN SERPL-MCNC: 25 MG/DL (ref 8–23)
BUN/CREAT SERPL: 29.4 (ref 7–25)
CALCIUM SPEC-SCNC: 9.1 MG/DL (ref 8.6–10.5)
CHLORIDE SERPL-SCNC: 101 MMOL/L (ref 98–107)
CO2 SERPL-SCNC: 23.9 MMOL/L (ref 22–29)
CREAT SERPL-MCNC: 0.85 MG/DL (ref 0.76–1.27)
DEPRECATED RDW RBC AUTO: 44.9 FL (ref 37–54)
EGFRCR SERPLBLD CKD-EPI 2021: 91.2 ML/MIN/1.73
EOSINOPHIL # BLD AUTO: 0.02 10*3/MM3 (ref 0–0.4)
EOSINOPHIL NFR BLD AUTO: 0.3 % (ref 0.3–6.2)
ERYTHROCYTE [DISTWIDTH] IN BLOOD BY AUTOMATED COUNT: 13.8 % (ref 12.3–15.4)
GLOBULIN UR ELPH-MCNC: 2.7 GM/DL
GLUCOSE SERPL-MCNC: 100 MG/DL (ref 65–99)
HCT VFR BLD AUTO: 41.2 % (ref 37.5–51)
HGB BLD-MCNC: 13.7 G/DL (ref 13–17.7)
IMM GRANULOCYTES # BLD AUTO: 0.01 10*3/MM3 (ref 0–0.05)
IMM GRANULOCYTES NFR BLD AUTO: 0.1 % (ref 0–0.5)
LYMPHOCYTES # BLD AUTO: 1.34 10*3/MM3 (ref 0.7–3.1)
LYMPHOCYTES NFR BLD AUTO: 17.4 % (ref 19.6–45.3)
MCH RBC QN AUTO: 29 PG (ref 26.6–33)
MCHC RBC AUTO-ENTMCNC: 33.3 G/DL (ref 31.5–35.7)
MCV RBC AUTO: 87.1 FL (ref 79–97)
MONOCYTES # BLD AUTO: 0.6 10*3/MM3 (ref 0.1–0.9)
MONOCYTES NFR BLD AUTO: 7.8 % (ref 5–12)
NEUTROPHILS NFR BLD AUTO: 5.71 10*3/MM3 (ref 1.7–7)
NEUTROPHILS NFR BLD AUTO: 74.1 % (ref 42.7–76)
PLATELET # BLD AUTO: 230 10*3/MM3 (ref 140–450)
PMV BLD AUTO: 9.4 FL (ref 6–12)
POTASSIUM SERPL-SCNC: 3.8 MMOL/L (ref 3.5–5.2)
PROT SERPL-MCNC: 6.9 G/DL (ref 6–8.5)
RBC # BLD AUTO: 4.73 10*6/MM3 (ref 4.14–5.8)
SODIUM SERPL-SCNC: 138 MMOL/L (ref 136–145)
WBC NRBC COR # BLD AUTO: 7.7 10*3/MM3 (ref 3.4–10.8)

## 2024-10-19 PROCEDURE — 73090 X-RAY EXAM OF FOREARM: CPT

## 2024-10-19 PROCEDURE — 80053 COMPREHEN METABOLIC PANEL: CPT | Performed by: PHYSICIAN ASSISTANT

## 2024-10-19 PROCEDURE — 85025 COMPLETE CBC W/AUTO DIFF WBC: CPT | Performed by: PHYSICIAN ASSISTANT

## 2024-10-19 PROCEDURE — 72100 X-RAY EXAM L-S SPINE 2/3 VWS: CPT

## 2024-10-19 PROCEDURE — 36415 COLL VENOUS BLD VENIPUNCTURE: CPT

## 2024-10-19 PROCEDURE — 99283 EMERGENCY DEPT VISIT LOW MDM: CPT

## 2024-10-19 NOTE — FSED PROVIDER NOTE
Subjective  History of Present Illness:    This is a 74-year-old male who presents after a fall 4 days ago.  Patient states that he just got turned around and fell.  Patient is fairly unsteady on his feet after a spine surgery in August of this year performed by Dr. Mckeon.  Patient states that he hit his right forearm and left side of his back.  He did not hit his head or lose consciousness.  Patient states he has no pain at all but just wanted to come get checked out.  Not anticoagulated.      Nurses Notes reviewed and agree, including vitals, allergies, social history and prior medical history.     REVIEW OF SYSTEMS: All systems reviewed and not pertinent unless noted.  Review of Systems    Past Medical History:   Diagnosis Date    Arthritis 2021    Balance problem     since neck surgery in 2021    Cancer     Basal cell skin cancer    Cervical disc disorder 2002    History of COVID-19 05/24/2024    took Paxlovid; no hospitalization required    History of recent fall 06/17/2024    lost balance and fell in garage when getting into car to come to [facility] in preparation for back surgery; large cut to left elbow    Hyperlipidemia     Hypertension     Pre-diabetes     Rosacea     Wears glasses        Allergies:    Patient has no known allergies.      Past Surgical History:   Procedure Laterality Date    CERVICAL DISC SURGERY  2002    Dr. Robbins, [facility]     CERVICAL DISCECTOMY POSTERIOR FUSION WITH BRAIN LAB N/A 08/12/2021    Procedure: POSTERIOR CERVICAL FUSION VERTEX SYSTEM, LAMINECTOMIES & FUSION C3-7;  Surgeon: Osmani Mckeon MD;  Location: [facility] OR;  Service: Neurosurgery;  Laterality: N/A;    COLONOSCOPY  2016    HIP ARTHROPLASTY Right 12/03/2018    Dr. Kimble    LUMBAR LAMINECTOMY DISCECTOMY DECOMPRESSION N/A 06/24/2024    Procedure: Lumbar decompressive laminectomies at L3-4 and L4-5 with discectomy at the L3-4;  Surgeon: Osmani Mckeon MD;  Location: [facility] OR;  Service: Neurosurgery;   "Laterality: N/A;    NECK SURGERY  2021    Cervecal lamindectomy    TONSILLECTOMY           Social History     Socioeconomic History    Marital status:    Tobacco Use    Smoking status: Never     Passive exposure: Never    Smokeless tobacco: Never   Vaping Use    Vaping status: Never Used   Substance and Sexual Activity    Alcohol use: Yes     Alcohol/week: 7.0 standard drinks of alcohol     Types: 7 Cans of beer per week     Comment: 7 drinks per day    Drug use: Never    Sexual activity: Defer         Family History   Problem Relation Age of Onset    Cancer Mother     Anxiety disorder Mother     Early death Mother     Stroke Father        Objective  Physical Exam:  /80   Pulse 99   Temp 98.2 °F (36.8 °C) (Oral)   Resp 16   Ht 177.8 cm (70\")   Wt 74.8 kg (165 lb)   SpO2 96%   BMI 23.68 kg/m²      Physical Exam  Vitals and nursing note reviewed.   Constitutional:       Appearance: Normal appearance.   Cardiovascular:      Rate and Rhythm: Normal rate.      Pulses: Normal pulses.      Heart sounds: Normal heart sounds.   Pulmonary:      Effort: Pulmonary effort is normal.      Breath sounds: Normal breath sounds.   Abdominal:      General: Abdomen is flat.   Musculoskeletal:      Comments: Full range of motion of all extremities   Skin:     Comments: There is a contusion to the right posterior forearm along with a small area of ecchymosis on the left low back.  Patient has no tenderness palpation of either of these areas.   Neurological:      Mental Status: He is alert.         Procedures    ED Course:         Lab Results (last 24 hours)       Procedure Component Value Units Date/Time    Comprehensive Metabolic Panel [[id number]]  (Abnormal) Collected: 10/19/24 1404    Specimen: Blood Updated: 10/19/24 1429     Glucose 100 mg/dL      BUN 25 mg/dL      Creatinine 0.85 mg/dL      Sodium 138 mmol/L      Potassium 3.8 mmol/L      Chloride 101 mmol/L      CO2 23.9 mmol/L      Calcium 9.1 mg/dL      " Total Protein 6.9 g/dL      Albumin 4.2 g/dL      ALT (SGPT) 10 U/L      AST (SGOT) 19 U/L      Alkaline Phosphatase 68 U/L      Total Bilirubin 1.5 mg/dL      Globulin 2.7 gm/dL      A/G Ratio 1.6 g/dL      BUN/Creatinine Ratio 29.4     Anion Gap 13.1 mmol/L      eGFR 91.2 mL/min/1.73     Narrative:      GFR Normal >60  Chronic Kidney Disease <60  Kidney Failure <15    The GFR formula is only valid for adults with stable renal function between ages 18 and 70.    CBC & Differential [[id number]]  (Abnormal) Collected: 10/19/24 1404    Specimen: Blood Updated: 10/19/24 1412    Narrative:      The following orders were created for panel order CBC & Differential.  Procedure                               Abnormality         Status                     ---------                               -----------         ------                     CBC Auto Differential[[id number]]        Abnormal            Final result                 Please view results for these tests on the individual orders.    CBC Auto Differential [[id number]]  (Abnormal) Collected: 10/19/24 1404    Specimen: Blood Updated: 10/19/24 1412     WBC 7.70 10*3/mm3      RBC 4.73 10*6/mm3      Hemoglobin 13.7 g/dL      Hematocrit 41.2 %      MCV 87.1 fL      MCH 29.0 pg      MCHC 33.3 g/dL      RDW 13.8 %      RDW-SD 44.9 fl      MPV 9.4 fL      Platelets 230 10*3/mm3      Neutrophil % 74.1 %      Lymphocyte % 17.4 %      Monocyte % 7.8 %      Eosinophil % 0.3 %      Basophil % 0.3 %      Immature Grans % 0.1 %      Neutrophils, Absolute 5.71 10*3/mm3      Lymphocytes, Absolute 1.34 10*3/mm3      Monocytes, Absolute 0.60 10*3/mm3      Eosinophils, Absolute 0.02 10*3/mm3      Basophils, Absolute 0.02 10*3/mm3      Immature Grans, Absolute 0.01 10*3/mm3              XR Spine Lumbar 2 or 3 View    Result Date: 10/19/2024  XR SPINE LUMBAR 2 OR 3 VW Date of Exam: 10/19/2024 2:01 PM EDT Indication: Pain after fall Comparison: Lumbar spine radiographs dated 5/3/2024, CT  lumbar spine dated 12/15/2022 Findings: There is grade 1 anterolisthesis of L4 and L5 by 4 mm. The vertebral body heights are maintained without evidence of acute fracture. There is posterior disc height loss at L3-L4 and L4-L5. There is degenerative facet arthropathy at L3-L4 and L4-L5. The SI joints are normal alignment. The spinous processes and transverse processes appear grossly intact.     Impression: Impression: 1. No evidence of acute fracture or traumatic subluxation. 2. Unchanged grade 1 anterolisthesis of L4 and L5 with degenerative facet arthropathy. 3. Mild posterior disc height loss at L3-L4 and L4-L5. Electronically Signed: Austin Quan  10/19/2024 2:34 PM EDT  Workstation ID: [id number]    XR Forearm 2 View Right    Result Date: 10/19/2024  XR FOREARM 2 VW RIGHT Date of Exam: 10/19/2024 2:01 PM EDT Indication: Pain after fall Comparison: Right elbow radiographs dated 6/29/2024 Findings: There is no acute fracture or dislocation. The elbow joint appears in normal alignment. There is normal alignment at the wrist. There is no localized soft tissue swelling. There is no elbow joint effusion. Bone mineralization is normal. No radiopaque foreign body.     Impression: Impression: 1. No acute osseous abnormality of the right forearm. Electronically Signed: Austin Glass  10/19/2024 2:31 PM EDT  Workstation ID: [id number]        [facility]      Initial impression of presenting illness: This is a 74-year-old male who presents with complaints of a recent fall.  Patient has no complaints but just wanted to be checked out.  X-rays did not reveal any acute findings, did show degenerative disc disease.  Patient has no pain of the lumbar spine.  He will be discharged home with instructions to follow-up with his primary care provider symptoms persist.  CBC and CMP nonactionable.    DDX: includes but is not limited to: Contusion, fracture, sprain      Medications - No data to display    Data interpreted: Nursing notes  reviewed, vital signs reviewed.  Labs independently interpreted by me (CBC, CMP, lipase, UA, troponin, ABG, lactic acid, procalcitonin).  Imaging independently interpreted by me (x-ray, CT scan).  EKG independently interpreted by me.  O2 saturation: 96%    Counseling: Discussed the results above with the patient regarding need for admission or discharge.  Patient understands and agrees plan of care.      -----  ED Disposition       ED Disposition   Discharge    Condition   Stable    Comment   --             Final diagnoses:   Fall from standing, initial encounter   Contusion of right forearm, initial encounter      Your Follow-Up Providers       Go to  [facility] EMERGENCY DEPARTMENT [place].    Specialty: Emergency Medicine  Follow up details: As needed, If symptoms worsen  [address]  [place] [facility] [address] [phone]             Jim Lopez MD In 1 week.    Specialty: Family Medicine  [address]  [address]  [facility] [address]  [phone]                       Contact information for after-discharge care    Follow-up information has not been specified.                    Your medication list        CONTINUE taking these medications        Instructions Last Dose Given Next Dose Due   hydroCHLOROthiazide 12.5 MG capsule  Commonly known as: MICROZIDE      Take 1 capsule by mouth Daily.       losartan 50 MG tablet  Commonly known as: COZAAR      Take 1 tablet by mouth Every Morning.       lovastatin 40 MG tablet  Commonly known as: MEVACOR      Take 1 tablet by mouth Every Night. for cholesterol       metroNIDAZOLE 1 % gel  Commonly known as: METROGEL      Apply 1 Application topically to the appropriate area as directed Daily.       promethazine-dextromethorphan 6.25-15 MG/5ML syrup  Commonly known as: PROMETHAZINE-DM      Take 5 mL by mouth 4 (Four) Times a Day.       psyllium 28.3 % pack pack  Commonly known as: KONSYL      Take 1 packet by mouth  Daily.       tamsulosin 0.4 MG capsule 24 hr capsule  Commonly known as: FLOMAX      Take 1 capsule by mouth Every Night.       Undecylenic Acid 25 % liquid      Apply 1 dose topically Every Night.

## 2024-10-19 NOTE — DISCHARGE INSTRUCTIONS
Continue taking all of your home medications as prescribed.  Please follow-up with your doctor in 1 week for recheck.

## 2024-10-29 ENCOUNTER — OFFICE VISIT (OUTPATIENT)
Dept: NEUROSURGERY | Facility: CLINIC | Age: 74
End: 2024-10-29
Payer: MEDICARE

## 2024-10-29 VITALS
BODY MASS INDEX: 24.01 KG/M2 | SYSTOLIC BLOOD PRESSURE: 120 MMHG | TEMPERATURE: 98.6 F | DIASTOLIC BLOOD PRESSURE: 78 MMHG | WEIGHT: 167.7 LBS | HEIGHT: 70 IN

## 2024-10-29 DIAGNOSIS — M48.062 SPINAL STENOSIS, LUMBAR REGION, WITH NEUROGENIC CLAUDICATION: Primary | ICD-10-CM

## 2024-10-29 DIAGNOSIS — Z98.890 S/P LUMBAR LAMINECTOMY: ICD-10-CM

## 2024-10-29 DIAGNOSIS — M43.16 SPONDYLOLISTHESIS OF LUMBAR REGION: ICD-10-CM

## 2024-10-29 DIAGNOSIS — R26.9 GAIT ABNORMALITY: ICD-10-CM

## 2024-10-29 DIAGNOSIS — G56.01 RIGHT CARPAL TUNNEL SYNDROME: ICD-10-CM

## 2024-10-29 PROCEDURE — 99214 OFFICE O/P EST MOD 30 MIN: CPT | Performed by: PHYSICIAN ASSISTANT

## 2024-10-29 NOTE — PROGRESS NOTES
Patient: Vladimir Haq  : 1950  Chart #: [id number]    Date of Service: 10/29/2024    CHIEF COMPLAINT:   1.  Bilateral lower extremity pain with walking and standing intolerance  2.  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  Remotely he underwent ACDF with Dr. Robbins.  In , Dr. Mckeon performed C3-7 decompression with fusion and stabilization for pronounced myelopathy.  More recently he presented with progressive back and lower extremity pain with walking and standing intolerance.  Ultimately on 2024 he underwent decompressive laminectomies at L3-4, L4-5, and right L3-4 discectomy.  Postoperatively he has had complete resolution of his pain.  He still drags his leg a bit and his gait is unsteady.  Today he complains of sensory alteration in the thumb index and long finger that is positional.  It is affecting his ability to paint      Past Medical History:   Diagnosis Date    Arthritis     Balance problem     since neck surgery in     Cancer     Basal cell skin cancer    Cervical disc disorder     History of COVID-19 2024    took Paxlovid; no hospitalization required    History of recent fall 2024    lost balance and fell in garage when getting into car to come to [facility] in preparation for back surgery; large cut to left elbow    Hyperlipidemia     Hypertension     Pre-diabetes     Rosacea     Wears glasses          Current Outpatient Medications:     hydroCHLOROthiazide (MICROZIDE) 12.5 MG capsule, Take 1 capsule by mouth Daily., Disp: , Rfl:     losartan (COZAAR) 50 MG tablet, Take 1 tablet by mouth Every Morning., Disp: , Rfl:     lovastatin (MEVACOR) 40 MG tablet, Take 1 tablet by mouth Every Night. for cholesterol, Disp: , Rfl:     metroNIDAZOLE (METROGEL) 1 % gel, Apply 1 Application topically to the appropriate area as directed Daily., Disp: , Rfl:     psyllium (KONSYL) 28.3 % pack  pack, Take 1 packet by mouth Daily., Disp: , Rfl:     tamsulosin (FLOMAX) 0.4 MG capsule 24 hr capsule, Take 1 capsule by mouth Every Night., Disp: , Rfl:     Undecylenic Acid 25 % liquid, Apply 1 dose topically Every Night., Disp: , Rfl:     Past Surgical History:   Procedure Laterality Date    CERVICAL DISC SURGERY  2002    Dr. Robbins, [facility]     CERVICAL DISCECTOMY POSTERIOR FUSION WITH BRAIN LAB N/A 08/12/2021    Procedure: POSTERIOR CERVICAL FUSION VERTEX SYSTEM, LAMINECTOMIES & FUSION C3-7;  Surgeon: Osmani Mckeon MD;  Location:  [facility];  Service: Neurosurgery;  Laterality: N/A;    COLONOSCOPY  2016    HIP ARTHROPLASTY Right 12/03/2018    Dr. Kimble    LUMBAR LAMINECTOMY DISCECTOMY DECOMPRESSION N/A 06/24/2024    Procedure: Lumbar decompressive laminectomies at L3-4 and L4-5 with discectomy at the L3-4;  Surgeon: Osmani Mckeon MD;  Location:  [facility] OR;  Service: Neurosurgery;  Laterality: N/A;    NECK SURGERY  2021    Cervecal lamindectomy    TONSILLECTOMY         Social History     Socioeconomic History    Marital status:    Tobacco Use    Smoking status: Never     Passive exposure: Never    Smokeless tobacco: Never   Vaping Use    Vaping status: Never Used   Substance and Sexual Activity    Alcohol use: Yes     Alcohol/week: 7.0 standard drinks of alcohol     Types: 7 Cans of beer per week     Comment: 7 drinks per day    Drug use: Never    Sexual activity: Defer         Review of Systems   Constitutional:  Negative for activity change, appetite change, chills, diaphoresis, fatigue, fever and unexpected weight change.   HENT:  Negative for congestion, dental problem, drooling, ear discharge, ear pain, facial swelling, hearing loss, mouth sores, nosebleeds, postnasal drip, rhinorrhea, sinus pressure, sinus pain, sneezing, sore throat, tinnitus, trouble swallowing and voice change.    Eyes:  Negative for photophobia, pain, discharge, redness, itching and visual disturbance.  "  Respiratory:  Negative for apnea, cough, choking, chest tightness, shortness of breath, wheezing and stridor.    Cardiovascular:  Negative for chest pain, palpitations and leg swelling.   Gastrointestinal:  Negative for abdominal distention, abdominal pain, anal bleeding, blood in stool, constipation, diarrhea, nausea, rectal pain and vomiting.   Endocrine: Negative for cold intolerance, heat intolerance, polydipsia, polyphagia and polyuria.   Genitourinary:  Negative for decreased urine volume, difficulty urinating, dysuria, enuresis, flank pain, frequency, genital sores, hematuria, penile discharge, penile pain, penile swelling, scrotal swelling, testicular pain and urgency.   Musculoskeletal:  Negative for arthralgias, back pain, gait problem, joint swelling, myalgias, neck pain and neck stiffness.   Skin:  Negative for color change, pallor, rash and wound.   Allergic/Immunologic: Negative for environmental allergies, food allergies and immunocompromised state.   Neurological:  Positive for numbness. Negative for dizziness, tremors, seizures, syncope, facial asymmetry, speech difficulty, weakness, light-headedness and headaches.   Hematological:  Negative for adenopathy. Does not bruise/bleed easily.   Psychiatric/Behavioral:  Negative for agitation, behavioral problems, confusion, decreased concentration, dysphoric mood, hallucinations, self-injury, sleep disturbance and suicidal ideas. The patient is not nervous/anxious and is not hyperactive.        Objective   Vital Signs: Blood pressure 120/78, temperature 98.6 °F (37 °C), temperature source Infrared, height 177.8 cm (70\"), weight 76.1 kg (167 lb 11.2 oz).  Physical Exam  Vitals and nursing note reviewed.   Constitutional:       General: He is not in acute distress.     Appearance: He is well-developed.   HENT:      Head: Normocephalic and atraumatic.   Psychiatric:         Behavior: Behavior normal.         Thought Content: Thought content normal. "         Assessment & Plan   Diagnosis:  Lumbar stenosis and disc herniation status post decompression  Remote cervical myelopathy  Possible right carpal tunnel syndrome    Medical Decision Making: In terms of his back, he is doing excellent.  He will continue with physical therapy working on his gait.  He has no complaints of pain.  In terms of his hand I have recommended wrist splint for the next 6 weeks.  If symptoms progress or do not improve we will consider electrodiagnostic studies.  He will follow-up in several months.        Diagnoses and all orders for this visit:    1. Spinal stenosis, lumbar region, with neurogenic claudication (Primary)    2. Spondylolisthesis of lumbar region    3. S/P lumbar laminectomy    4. Gait abnormality    5. Right carpal tunnel syndrome                        BMI is within normal parameters. No other follow-up for BMI required.         Amita Sanabria PA-C  Patient Care Team:  Jim Lopez MD as PCP - General  Jim Lopez MD as PCP - Family Medicine  Yonny Blair MD as Referring Physician (Neurology)  Osmani Mckeon MD as Consulting Physician (Neurosurgery)

## 2024-11-23 ENCOUNTER — HOSPITAL ENCOUNTER (EMERGENCY)
Facility: HOSPITAL | Age: 74
Discharge: HOME | End: 2024-11-23
Attending: EMERGENCY MEDICINE
Payer: MEDICARE

## 2024-11-23 ENCOUNTER — APPOINTMENT (OUTPATIENT)
Facility: HOSPITAL | Age: 74
End: 2024-11-23
Payer: MEDICARE

## 2024-11-23 VITALS
BODY MASS INDEX: 23.62 KG/M2 | DIASTOLIC BLOOD PRESSURE: 71 MMHG | SYSTOLIC BLOOD PRESSURE: 122 MMHG | OXYGEN SATURATION: 96 % | HEART RATE: 103 BPM | WEIGHT: 165 LBS | RESPIRATION RATE: 20 BRPM | HEIGHT: 70 IN | TEMPERATURE: 98.3 F

## 2024-11-23 DIAGNOSIS — S20.212A CONTUSION OF RIB ON LEFT SIDE, INITIAL ENCOUNTER: Primary | ICD-10-CM

## 2024-11-23 PROCEDURE — 99283 EMERGENCY DEPT VISIT LOW MDM: CPT

## 2024-11-23 PROCEDURE — 25010000002 KETOROLAC TROMETHAMINE PER 15 MG: Performed by: EMERGENCY MEDICINE

## 2024-11-23 PROCEDURE — 96372 THER/PROPH/DIAG INJ SC/IM: CPT

## 2024-11-23 PROCEDURE — 71101 X-RAY EXAM UNILAT RIBS/CHEST: CPT

## 2024-11-23 RX ORDER — KETOROLAC TROMETHAMINE 10 MG/1
10 TABLET, FILM COATED ORAL EVERY 6 HOURS PRN
Qty: 12 TABLET | Refills: 0 | Status: SHIPPED | OUTPATIENT
Start: 2024-11-23

## 2024-11-23 RX ORDER — KETOROLAC TROMETHAMINE 30 MG/ML
30 INJECTION, SOLUTION INTRAMUSCULAR; INTRAVENOUS ONCE
Status: COMPLETED | OUTPATIENT
Start: 2024-11-23 | End: 2024-11-23

## 2024-11-23 RX ADMIN — KETOROLAC TROMETHAMINE 30 MG: 30 INJECTION, SOLUTION INTRAMUSCULAR; INTRAVENOUS at 22:01

## 2024-11-24 NOTE — FSED PROVIDER NOTE
Subjective   History of Present Illness  Patient presents to the emergency department for left-sided rib pain.  About 2 hours prior says he lost his balance and slid into a [employer] countertop.  Has pain in his left anterior ribs.  Does it hurts to take a deep breath.  Did not strike head or lose consciousness.  Did not fall the way to the floor.  He denies any bruising or other injuries.    History provided by:  Patient   used: No        Review of Systems   Cardiovascular:  Positive for chest pain.   All other systems reviewed and are negative.      Past Medical History:   Diagnosis Date    Arthritis 2021    Balance problem     since neck surgery in 2021    Cancer     Basal cell skin cancer    Cervical disc disorder 2002    History of COVID-19 05/24/2024    took Paxlovid; no hospitalization required    History of recent fall 06/17/2024    lost balance and fell in garage when getting into car to come to [facility] in preparation for back surgery; large cut to left elbow    Hyperlipidemia     Hypertension     Pre-diabetes     Rosacea     Wears glasses        No Known Allergies    Past Surgical History:   Procedure Laterality Date    CERVICAL DISC SURGERY  2002    Dr. Robbins, [facility]     CERVICAL DISCECTOMY POSTERIOR FUSION WITH BRAIN LAB N/A 08/12/2021    Procedure: POSTERIOR CERVICAL FUSION VERTEX SYSTEM, LAMINECTOMIES & FUSION C3-7;  Surgeon: Osmani Mckeon MD;  Location:  [facility] OR;  Service: Neurosurgery;  Laterality: N/A;    COLONOSCOPY  2016    HIP ARTHROPLASTY Right 12/03/2018    Dr. Kimble    LUMBAR LAMINECTOMY DISCECTOMY DECOMPRESSION N/A 06/24/2024    Procedure: Lumbar decompressive laminectomies at L3-4 and L4-5 with discectomy at the L3-4;  Surgeon: Osmani Mckeon MD;  Location:  [facility];  Service: Neurosurgery;  Laterality: N/A;    NECK SURGERY  2021    Cervecal lamindectomy    TONSILLECTOMY         Family History   Problem Relation Age of Onset    Cancer Mother      Anxiety disorder Mother     Early death Mother         Mother was a heavy smoker    Stroke Father     Arthritis Maternal Aunt        Social History     Socioeconomic History    Marital status:    Tobacco Use    Smoking status: Never     Passive exposure: Never    Smokeless tobacco: Never   Vaping Use    Vaping status: Never Used   Substance and Sexual Activity    Alcohol use: Yes     Alcohol/week: 7.0 standard drinks of alcohol     Types: 7 Cans of beer per week     Comment: 7 drinks per day    Drug use: Never    Sexual activity: Defer           Objective   Physical Exam  Vitals and nursing note reviewed.   Constitutional:       General: He is not in acute distress.     Appearance: Normal appearance. He is not toxic-appearing.   HENT:      Head: Normocephalic and atraumatic.      Left Ear: Tympanic membrane normal.      Nose: Nose normal.      Mouth/Throat:      Mouth: Mucous membranes are moist.      Pharynx: Oropharynx is clear.   Eyes:      Extraocular Movements: Extraocular movements intact.      Pupils: Pupils are equal, round, and reactive to light.   Cardiovascular:      Rate and Rhythm: Normal rate and regular rhythm.      Pulses: Normal pulses.      Heart sounds: Normal heart sounds.   Pulmonary:      Effort: Pulmonary effort is normal. No respiratory distress.      Breath sounds: Normal breath sounds. No wheezing or rales.   Chest:      Chest wall: Tenderness (Left anterior ribs no bruising) present.   Abdominal:      General: There is no distension.      Palpations: Abdomen is soft. There is no mass.      Tenderness: There is no abdominal tenderness. There is no guarding or rebound.   Musculoskeletal:         General: No swelling, tenderness or deformity. Normal range of motion.      Cervical back: Normal range of motion and neck supple. No tenderness.   Skin:     General: Skin is warm and dry.      Capillary Refill: Capillary refill takes less than 2 seconds.   Neurological:      General: No  focal deficit present.      Mental Status: He is alert and oriented to person, place, and time.      Cranial Nerves: No cranial nerve deficit.      Motor: No weakness.   Psychiatric:         Mood and Affect: Mood normal.         Behavior: Behavior normal.         Procedures           ED Course                                           Medical Decision Making  Hemodynamically stable and afebrile.  Patient has no external signs of trauma.  No paradoxical movement or other abnormalities on exam.  Radiograph showed old rib fractures but nothing acute.  Given symptom spirometry and pain control for home.  Given return precautions care instructions and discharge    Problems Addressed:  Contusion of rib on left side, initial encounter: complicated acute illness or injury    Amount and/or Complexity of Data Reviewed  Radiology: ordered. Decision-making details documented in ED Course.    Risk  Prescription drug management.        Final diagnoses:   Contusion of rib on left side, initial encounter       ED Disposition  ED Disposition       ED Disposition   Discharge    Condition   Stable    Comment   --               Jim Lopez MD  [address]  [address]  [place] [address]  [phone]    Schedule an appointment as soon as possible for a visit   As needed    [facility] EMERGENCY DEPARTMENT [place]  [address]  [facility] [address] [phone]  Go to   If symptoms worsen         Medication List        New Prescriptions      ketorolac 10 MG tablet  Commonly known as: TORADOL  Take 1 tablet by mouth Every 6 (Six) Hours As Needed for Moderate Pain.               Where to Get Your Medications        [facility] [address] [facility] - [phone]  - [phone]   [address] ) [facility] [address]      Phone: [phone]   ketorolac 10 MG tablet

## 2024-12-10 ENCOUNTER — TELEPHONE (OUTPATIENT)
Dept: NEUROSURGERY | Facility: CLINIC | Age: 74
End: 2024-12-10
Payer: MEDICARE

## 2024-12-10 DIAGNOSIS — G56.01 RIGHT CARPAL TUNNEL SYNDROME: Primary | ICD-10-CM

## 2024-12-10 NOTE — TELEPHONE ENCOUNTER
Caller: PATIENT    Relationship: SELF    Best call back number: [phone]    What is the best time to reach you: ANYTIME    Who are you requesting to speak with (clinical staff, provider,  specific staff member): CLINICAL STAFF    Do you know the name of the person who called: NA    What was the call regarding: PATIENT CALLED AND ADVISED HE WAS WANTING TO COME BACK TO BE SEEN FOR HIS CARPAL TUNNEL-PATIENT WAS LAST SEEN IN OCTOBER AND TOLD TO FOLLOW UP IN SEVERAL MONTHS-MADE PATIENT APPT, FOR [facility]ANY[facility]. HE WAS ASKING FOR A SOONER APPT. AS WELL AS POSSIBLY ANY TEST THAT NEEDED SCHEDULING BEFORE APPT. SENDING TO OFFICE TO ADVISE OF CALL THANK YOU    Is it okay if the provider responds through [employer]:

## 2024-12-11 NOTE — TELEPHONE ENCOUNTER
Patient is requesting to follow up for carpal tunnel. Does patient need an EMG/NCV?    If yes, please pend an order. (He is an established patient of Dr. Yonny Blair. Will schedule with his office.)

## 2024-12-13 NOTE — TELEPHONE ENCOUNTER
Left VM with patient to advise will need EMG/NCV prior to follow up. Any dates that need to be avoided/has other appointments?

## 2025-01-07 ENCOUNTER — OFFICE VISIT (OUTPATIENT)
Dept: NEUROSURGERY | Facility: CLINIC | Age: 75
End: 2025-01-07
Payer: MEDICARE

## 2025-01-07 VITALS
HEART RATE: 102 BPM | WEIGHT: 167 LBS | HEIGHT: 70 IN | BODY MASS INDEX: 23.91 KG/M2 | OXYGEN SATURATION: 95 % | RESPIRATION RATE: 16 BRPM | DIASTOLIC BLOOD PRESSURE: 92 MMHG | TEMPERATURE: 97.7 F | SYSTOLIC BLOOD PRESSURE: 148 MMHG

## 2025-01-07 DIAGNOSIS — M48.062 SPINAL STENOSIS, LUMBAR REGION, WITH NEUROGENIC CLAUDICATION: ICD-10-CM

## 2025-01-07 DIAGNOSIS — R26.9 GAIT ABNORMALITY: ICD-10-CM

## 2025-01-07 DIAGNOSIS — M43.16 SPONDYLOLISTHESIS OF LUMBAR REGION: ICD-10-CM

## 2025-01-07 DIAGNOSIS — G56.01 RIGHT CARPAL TUNNEL SYNDROME: Primary | ICD-10-CM

## 2025-01-07 PROCEDURE — 99213 OFFICE O/P EST LOW 20 MIN: CPT | Performed by: PHYSICIAN ASSISTANT

## 2025-01-07 RX ORDER — CICLOPIROX 80 MG/ML
SOLUTION TOPICAL
COMMUNITY
Start: 2024-12-19

## 2025-01-07 RX ORDER — TADALAFIL 5 MG/1
1 TABLET ORAL DAILY
COMMUNITY
Start: 2024-11-01

## 2025-01-07 NOTE — PROGRESS NOTES
Patient: Vladimir Haq  : 1950  Chart #: [id number]    Date of Service: 10/29/2024    CHIEF COMPLAINT:   1.  Bilateral lower extremity pain with walking and standing intolerance  2.  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  Remotely he underwent ACDF with Dr. Robbins.  In , Dr. Mckeon performed C3-7 decompression with fusion and stabilization for pronounced myelopathy.  More recently he presented with progressive back and lower extremity pain with walking and standing intolerance.  Ultimately on 2024 he underwent decompressive laminectomies at L3-4, L4-5, and right L3-4 discectomy.  Postoperatively he has had complete resolution of his pain.  He still drags his leg a bit and his gait is unsteady.      When I last saw him, he was complaining of sensory alteration in the thumb index and long finger that is positional.  It is affecting his ability to paint.  I prescribed a wrist splint and referred him for electrodiagnostic studies and is here today to review.  Since wearing the wrist splint his symptoms have been better controlled.  He does not wake up at night with numbness.  He generally only wears the wrist splint during the day.      Past Medical History:   Diagnosis Date    Arthritis     Balance problem     since neck surgery in     Cancer     Basal cell skin cancer    Cervical disc disorder     History of COVID-19 2024    took Paxlovid; no hospitalization required    History of recent fall 2024    lost balance and fell in garage when getting into car to come to [facility] in preparation for back surgery; large cut to left elbow    Hyperlipidemia     Hypertension     Pre-diabetes     Rosacea     Wears glasses          Current Outpatient Medications:     ciclopirox (PENLAC) 8 % solution, APPLY SOLUTION TOPICALLY TO AFFECTED AREA(S) ONCE DAILY AT BEDTIME OR 8 HOURS BEFORE WASHING, Disp: ,  Rfl:     hydroCHLOROthiazide (MICROZIDE) 12.5 MG capsule, Take 1 capsule by mouth Daily., Disp: , Rfl:     ketorolac (TORADOL) 10 MG tablet, Take 1 tablet by mouth Every 6 (Six) Hours As Needed for Moderate Pain., Disp: 12 tablet, Rfl: 0    losartan (COZAAR) 50 MG tablet, Take 1 tablet by mouth Every Morning., Disp: , Rfl:     lovastatin (MEVACOR) 40 MG tablet, Take 1 tablet by mouth Every Night. for cholesterol, Disp: , Rfl:     metroNIDAZOLE (METROGEL) 1 % gel, Apply 1 Application topically to the appropriate area as directed Daily., Disp: , Rfl:     psyllium (KONSYL) 28.3 % pack pack, Take 1 packet by mouth Daily., Disp: , Rfl:     tadalafil (CIALIS) 5 MG tablet, Take 1 tablet by mouth Daily., Disp: , Rfl:     tamsulosin (FLOMAX) 0.4 MG capsule 24 hr capsule, Take 1 capsule by mouth Every Night., Disp: , Rfl:     Undecylenic Acid 25 % liquid, Apply 1 dose topically Every Night., Disp: , Rfl:     Past Surgical History:   Procedure Laterality Date    CERVICAL DISC SURGERY  2002    Dr. Robbins, [facility]     CERVICAL DISCECTOMY POSTERIOR FUSION WITH BRAIN LAB N/A 08/12/2021    Procedure: POSTERIOR CERVICAL FUSION VERTEX SYSTEM, LAMINECTOMIES & FUSION C3-7;  Surgeon: Osmani Mckeon MD;  Location:  [facility];  Service: Neurosurgery;  Laterality: N/A;    COLONOSCOPY  2016    HIP ARTHROPLASTY Right 12/03/2018    Dr. Kimble    LUMBAR LAMINECTOMY DISCECTOMY DECOMPRESSION N/A 06/24/2024    Procedure: Lumbar decompressive laminectomies at L3-4 and L4-5 with discectomy at the L3-4;  Surgeon: Osmani Mckeon MD;  Location:  [facility];  Service: Neurosurgery;  Laterality: N/A;    NECK SURGERY  2021    Cervecal lamindectomy    TONSILLECTOMY         Social History     Socioeconomic History    Marital status:    Tobacco Use    Smoking status: Never     Passive exposure: Never    Smokeless tobacco: Never   Vaping Use    Vaping status: Never Used   Substance and Sexual Activity    Alcohol use: Yes      Alcohol/week: 7.0 standard drinks of alcohol     Types: 7 Cans of beer per week     Comment: 7 drinks per day    Drug use: Never    Sexual activity: Not Currently     Birth control/protection: Diaphragm         Review of Systems   Constitutional:  Negative for activity change, appetite change, chills, diaphoresis, fatigue, fever and unexpected weight change.   HENT:  Negative for congestion, dental problem, drooling, ear discharge, ear pain, facial swelling, hearing loss, mouth sores, nosebleeds, postnasal drip, rhinorrhea, sinus pressure, sinus pain, sneezing, sore throat, tinnitus, trouble swallowing and voice change.    Eyes:  Negative for photophobia, pain, discharge, redness, itching and visual disturbance.   Respiratory:  Negative for apnea, cough, choking, chest tightness, shortness of breath, wheezing and stridor.    Cardiovascular:  Negative for chest pain, palpitations and leg swelling.   Gastrointestinal:  Negative for abdominal distention, abdominal pain, anal bleeding, blood in stool, constipation, diarrhea, nausea, rectal pain and vomiting.   Endocrine: Negative for cold intolerance, heat intolerance, polydipsia, polyphagia and polyuria.   Genitourinary:  Negative for decreased urine volume, difficulty urinating, dysuria, enuresis, flank pain, frequency, genital sores, hematuria, penile discharge, penile pain, penile swelling, scrotal swelling, testicular pain and urgency.   Musculoskeletal:  Negative for arthralgias, back pain, gait problem, joint swelling, myalgias, neck pain and neck stiffness.   Skin:  Negative for color change, pallor, rash and wound.   Allergic/Immunologic: Negative for environmental allergies, food allergies and immunocompromised state.   Neurological:  Positive for numbness. Negative for dizziness, tremors, seizures, syncope, facial asymmetry, speech difficulty, weakness, light-headedness and headaches.   Hematological:  Negative for adenopathy. Does not bruise/bleed easily.  "  Psychiatric/Behavioral:  Negative for agitation, behavioral problems, confusion, decreased concentration, dysphoric mood, hallucinations, self-injury, sleep disturbance and suicidal ideas. The patient is not nervous/anxious and is not hyperactive.        Objective   Vital Signs: Blood pressure 148/92, pulse 102, temperature 97.7 °F (36.5 °C), temperature source Temporal, resp. rate 16, height 177.8 cm (70\"), weight 75.8 kg (167 lb), SpO2 95%.  Physical Exam  Vitals and nursing note reviewed.   Constitutional:       General: He is not in acute distress.     Appearance: He is well-developed.   HENT:      Head: Normocephalic and atraumatic.   Psychiatric:         Behavior: Behavior normal.         Thought Content: Thought content normal.     Station and gait remain spastic which is baseline for him.    He is wearing wrist splint on the right wrist. No sensory alteration today. No thenar atrophy.     Electrodiagnostic studies performed by Dr. Blair on 1/7/2025 demonstrate severe right carpal tunnel syndrome, moderate to severe left carpal tunnel syndrome, mild bilateral ulnar neuropathies at the elbow.  Mild residual chronic right C5-6 radiculopathy.    Assessment & Plan   Diagnosis:  Bilateral carpal tunnel syndrome, R>L  Lumbar stenosis and disc herniation status post decompression  Remote cervical myelopathy    Medical Decision Making: I discussed treatment measures for carpal tunnel syndrome with patient.  He seems to be getting by with using a wrist splint.  Symptoms do not afflicting him at nighttime.  If symptoms were to get worse one would consider carpal tunnel release.  I discussed the general nature of that procedure with patient today.  Given his improvement with using the wrist splint, I would hold off on that for now.  He can call us if things get worse and we can push forward with surgery.      Diagnoses and all orders for this visit:    1. Right carpal tunnel syndrome (Primary)    2. Spinal stenosis, " lumbar region, with neurogenic claudication    3. Spondylolisthesis of lumbar region    4. Gait abnormality                          BMI is within normal parameters. No other follow-up for BMI required.         Amita Sanabria PA-C  Patient Care Team:  Jim Lopez MD as PCP - General  Jim Lopez MD as PCP - Family Medicine  Yonny Blair MD as Referring Physician (Neurology)  Osmani Mckeon MD as Consulting Physician (Neurosurgery)

## 2025-01-26 ENCOUNTER — HOSPITAL ENCOUNTER (EMERGENCY)
Facility: HOSPITAL | Age: 75
Discharge: HOME | End: 2025-01-26
Attending: EMERGENCY MEDICINE | Admitting: EMERGENCY MEDICINE
Payer: MEDICARE

## 2025-01-26 ENCOUNTER — APPOINTMENT (OUTPATIENT)
Facility: HOSPITAL | Age: 75
End: 2025-01-26
Payer: MEDICARE

## 2025-01-26 VITALS
TEMPERATURE: 98 F | OXYGEN SATURATION: 95 % | SYSTOLIC BLOOD PRESSURE: 135 MMHG | RESPIRATION RATE: 16 BRPM | HEIGHT: 70 IN | HEART RATE: 81 BPM | DIASTOLIC BLOOD PRESSURE: 65 MMHG | BODY MASS INDEX: 23.62 KG/M2 | WEIGHT: 165 LBS

## 2025-01-26 DIAGNOSIS — S22.42XA CLOSED FRACTURE OF TWO RIBS OF LEFT SIDE, INITIAL ENCOUNTER: Primary | ICD-10-CM

## 2025-01-26 PROCEDURE — 99284 EMERGENCY DEPT VISIT MOD MDM: CPT

## 2025-01-26 PROCEDURE — 71250 CT THORAX DX C-: CPT

## 2025-01-26 NOTE — DISCHARGE INSTRUCTIONS
Worsening or changing symptoms.  Please follow-up with primary care soon as possible.  Please continue to use incentive spirometer multiple times per day as instructions dictate.

## 2025-01-26 NOTE — FSED PROVIDER NOTE
Subjective  History of Present Illness:    Patient is a 74-year-old male presents after mechanical fall where he was attempting to get up from his scooter and he fell backwards.  Patient complains of pain in the left lower posterior ribs.  Patient states he did not hit his head or lose consciousness.  Patient denies any other pain from the fall or injury.  Patient states he is concerned about possible broken ribs.  Patient denies chest pain, shortness of breath, abdominal pain, headache, dizziness, nausea, vomiting.      Nurses Notes reviewed and agree, including vitals, allergies, social history and prior medical history.     REVIEW OF SYSTEMS: All systems reviewed and not pertinent unless noted.  Review of Systems    Past Medical History:   Diagnosis Date    Arthritis 2021    Balance problem     since neck surgery in 2021    Cancer     Basal cell skin cancer    Cervical disc disorder 2002    History of COVID-19 05/24/2024    took Paxlovid; no hospitalization required    History of recent fall 06/17/2024    lost balance and fell in garage when getting into car to come [facility] in preparation for back [facility]; large cut to left elbow    Hyperlipidemia     Hypertension     Pre-diabetes     Rosacea     Wears glasses        Allergies:    Patient has no known allergies.      Past Surgical History:   Procedure Laterality Date    CERVICAL DISC SURGERY  2002    Dr. Robbins, [facility]     CERVICAL DISCECTOMY POSTERIOR FUSION WITH BRAIN LAB N/A 08/12/2021    Procedure: POSTERIOR CERVICAL FUSION VERTEX SYSTEM, LAMINECTOMIES & FUSION C3-7;  Surgeon: Osmani Mckeon MD;  Location: [facility];  Service: Neurosurgery;  Laterality: N/A;    COLONOSCOPY  2016    HIP ARTHROPLASTY Right 12/03/2018    Dr. Kimble    LUMBAR LAMINECTOMY DISCECTOMY DECOMPRESSION N/A 06/24/2024    Procedure: Lumbar decompressive laminectomies at L3-4 and L4-5 with discectomy at the L3-4;  Surgeon: Osmani Mckeon MD;  Location: [facility] OR;   "Service: Neurosurgery;  Laterality: N/A;    NECK SURGERY  2021    Cervecal lamindectomy    TONSILLECTOMY           Social History     Socioeconomic History    Marital status:    Tobacco Use    Smoking status: Never     Passive exposure: Never    Smokeless tobacco: Never   Vaping Use    Vaping status: Never Used   Substance and Sexual Activity    Alcohol use: Yes     Alcohol/week: 7.0 standard drinks of alcohol     Types: 7 Cans of beer per week     Comment: 7 drinks per day    Drug use: Never    Sexual activity: Not Currently     Birth control/protection: Diaphragm         Family History   Problem Relation Age of Onset    Cancer Mother     Anxiety disorder Mother     Early death Mother         Mother was a heavy smoker    Stroke Father     Arthritis Maternal Aunt        Objective  Physical Exam:  /65 (BP Location: Left arm, Patient Position: Sitting)   Pulse 81   Temp 98 °F (36.7 °C) (Oral)   Resp 16   Ht 177.8 cm (70\")   Wt 74.8 kg (165 lb)   SpO2 95%   BMI 23.68 kg/m²      Physical Exam  Constitutional:       Appearance: Well-developed. No acute distress  HENT:      Head: Normocephalic and atraumatic.  No pain to palpation of head or face.    Mouth/Throat:      Mouth: Mucous membranes are moist.      Eyes:      Extraocular Movements: Extraocular movements intact.   Cardiovascular:      Rate and Rhythm: Normal rate and regular rhythm.      Heart sounds: Normal heart sounds.   Pain palpation left lower posterior ribs.  No pain to palpation remainder of extremities or vertebrae.  Pulmonary:      Effort: Pulmonary effort is normal. No respiratory distress.      Breath sounds: Normal breath sounds.   Abdominal:      General: There is no distension.      Palpations: Abdomen is soft and nontender. No rebound tenderness or guarding noted  Musculoskeletal:         General: No swelling or tenderness.  Active range of motion of all  extremities: 2+ pulses all extremities.  Moves all 4s   Skin:     " General: Skin is warm and dry.  No overlying open skin wounds or skin discoloration noted.     Capillary Refill: Capillary refill takes less than 2 seconds.   Neurological:      Mental Status:Alert and oriented to person, place, and time.   Mentation is normal   Psychiatric:         Mood and Affect: Mood normal.         Behavior: Behavior normal.     Procedures    ED Course:         Lab Results (last 24 hours)       ** No results found for the last 24 hours. **             CT Chest Without Contrast Diagnostic    Result Date: 1/26/2025  CT CHEST WO CONTRAST DIAGNOSTIC Date of Exam: 1/26/2025 3:20 PM EST Indication: trauma. Comparison: Chest x-ray 11/23/2024, CT thoracic spine 12/15/2022 Technique: Axial CT images were obtained of the chest without contrast administration.  Reconstructed coronal and sagittal images were also obtained. Automated exposure control and iterative construction methods were used. Findings: MEDIASTINUM: Unremarkable. Aortic and heart size are normal. No mass nor pericardial effusion. CORONARY ARTERIES: There is calcified atherosclerotic disease. LUNGS: Lungs are clear. No consolidation. No significant nodule nor interstitial changes. PLEURAL SPACE: No effusion, mass, nor pneumothorax. LYMPH NODES: There are no pathologically enlarged lymph nodes. UPPER ABDOMEN: Unremarkable OSSEOUS STRUCTURES: There appear to be acute relatively nondisplaced fractures involving the posterior margins of the left 10th and 11th ribs. There is a subacute healing fracture involving the lateral margin of the left seventh rib.     Impression: Impression: 1.There what appear to be acute left 10th and 11th rib fractures. Subacute left seventh rib fracture. Electronically Signed: Michael Ríos MD  1/26/2025 3:57 PM EST  Workstation ID: [id number]        [facility]      Initial impression of presenting illness:  Mechanical fall injuring left    DDX: includes but is not limited to: Sprain, strain, contusion, fracture,  dislocation    Patient arrives comfortable, vital signs stable with vitals interpreted by myself.     Pertinent results: CT scan shows acute 10th and 11th rib fractures.  Subacute seventh rib fracture.    Diagnostic information from other sources: Chart review    Interventions / Re-evaluation: Patient resting comfortably, states pain is a 1 out of 10    Medications - No data to display    Results/clinical rationale were discussed with patient    Consultations/Discussion of results with other physicians: attending    Data interpreted: Nursing notes reviewed, vital signs reviewed.  Labs independently interpreted by me     Counseling: Discussed the results above with the patient regarding need for admission or discharge.  Patient understands and agrees plan of care.  Discussed with patients the results from today's visit. Discussed with patient strict return precautions and they verbalize understanding. Recommend to them following up with primary care as soon as possible. Patient is discharged hemodynamically stable and comfortable.   Patient's goal was 1500 and got to 1250, discussed with attending who believes this is safe for patient to go home using this at this level.  Patient agreeable to use the spirometer as instructed and given instructions for this. Patient is easily able to produce a cough and move and walk around.  Discussed the main risk is pneumonia and the significant need to use the incentive spirometer and follow-up with primary care soon as possible and he is agreeable with this.  Patient is discharged comfortable vital signs stable, O2 95 to 100% on room air.    -----  ED Disposition       ED Disposition   Discharge    Condition   Stable    Comment   --             Final diagnoses:   Closed fracture of two ribs of left side, initial encounter      Your Follow-Up Providers       Schedule an appointment as soon as possible for a visit  with Jim Lopez MD.    Specialty: Family  [place] [id number] [address]  [phone]                       Contact information for after-discharge care    Follow-up information has not been specified.                    Your medication list        CONTINUE taking these medications        Instructions Last Dose Given Next Dose Due   ciclopirox 8 % solution  Commonly known as: PENLAC      APPLY SOLUTION TOPICALLY TO AFFECTED AREA(S) ONCE DAILY AT BEDTIME OR 8 HOURS BEFORE WASHING       hydroCHLOROthiazide 12.5 MG capsule  Commonly known as: MICROZIDE      Take 1 capsule by mouth Daily.       ketorolac 10 MG tablet  Commonly known as: TORADOL      Take 1 tablet by mouth Every 6 (Six) Hours As Needed for Moderate Pain.       losartan 50 MG tablet  Commonly known as: COZAAR      Take 1 tablet by mouth Every Morning.       lovastatin 40 MG tablet  Commonly known as: MEVACOR      Take 1 tablet by mouth Every Night. for cholesterol       metroNIDAZOLE 1 % gel  Commonly known as: METROGEL      Apply 1 Application topically to the appropriate area as directed Daily.       psyllium 28.3 % pack pack  Commonly known as: KONSYL      Take 1 packet by mouth Daily.       tadalafil 5 MG tablet  Commonly known as: CIALIS      Take 1 tablet by mouth Daily.       tamsulosin 0.4 MG capsule 24 hr capsule  Commonly known as: FLOMAX      Take 1 capsule by mouth Every Night.       Undecylenic Acid 25 % liquid      Apply 1 dose topically Every Night.

## 2025-01-29 ENCOUNTER — TELEPHONE (OUTPATIENT)
Dept: NEUROSURGERY | Facility: CLINIC | Age: 75
End: 2025-01-29
Payer: MEDICARE

## 2025-01-29 NOTE — TELEPHONE ENCOUNTER
PATIENT CALLED AND STATES AT HIS LAST VISIT WITH ELIZABETH GOLDSTEIN HE WAS TOLD WHEN HE WAS READY FOR SURGERY FOR HIS CARPAL TUNNEL TO CALL.  PATIENT STATES HE IS READY TO HAVE ONE SIDE COMPLETED.     PLEASE CALL PATIENT   THANK YOU

## 2025-01-29 NOTE — TELEPHONE ENCOUNTER
Provider:  Rubina  Surgery/Procedure:  Lumbar decompressive laminectomies at L3-4 and L4-5 with discectomy at L3-4  Surgery/Procedure Date:  6/24/24  Last visit:   1/7/25  Next visit: NA     Reason for call:  Spoke to Mr. Haq. He reports that with time he has not had any improvement in his symptoms, continues to have pain and numbness in his index and middle fingers. He does wear a splint during the day. He is interested to pursue carpal tunnel surgery at this time.

## 2025-01-31 NOTE — TELEPHONE ENCOUNTER
I called the patient to schedule a follow up. He had a fall 1/26/2025 and fractured some ribs and is having some issues that his PCP recommends he hold off on any more surgeries at this time.   I let him know when he feels well enough, to call the office back and we will be happy to schedule him for an appointment.

## (undated) DEVICE — C-ARM DRAPE: Brand: DEROYAL

## (undated) DEVICE — INSTRUMENT 6956011 2.9MM DRL BIT STRLE

## (undated) DEVICE — PATIENT RETURN ELECTRODE, SINGLE-USE, CONTACT QUALITY MONITORING, ADULT, WITH 9FT CORD, FOR PATIENTS WEIGING OVER 33LBS. (15KG): Brand: MEGADYNE

## (undated) DEVICE — APPL CHLORAPREP TINTED 26ML TEAL

## (undated) DEVICE — ANTIBACTERIAL UNDYED BRAIDED (POLYGLACTIN 910), SYNTHETIC ABSORBABLE SUTURE: Brand: COATED VICRYL

## (undated) DEVICE — SUT SILK 2/0 PS 18IN 1588H

## (undated) DEVICE — ELECTRD BLD EZ CLN MOD 4IN

## (undated) DEVICE — PROXIMATE RH ROTATING HEAD SKIN STAPLERS (35 WIDE) CONTAINS 35 STAINLESS STEEL STAPLES: Brand: PROXIMATE

## (undated) DEVICE — TRAP,MUCUS SPECIMEN,40CC: Brand: MEDLINE

## (undated) DEVICE — SUT VIC PLS CTD ANTIB BR 3/0 8/18IN 45CM

## (undated) DEVICE — INTENDED FOR TISSUE SEPARATION, AND OTHER PROCEDURES THAT REQUIRE A SHARP SURGICAL BLADE TO PUNCTURE OR CUT.: Brand: BARD-PARKER ® STAINLESS STEEL BLADES

## (undated) DEVICE — PACK,UNIVERSAL,NO GOWNS: Brand: MEDLINE

## (undated) DEVICE — TOOL 14MH30 LEGEND 14CM 3MM: Brand: MIDAS REX ™

## (undated) DEVICE — SHEET,DRAPE,40X58,STERILE: Brand: MEDLINE

## (undated) DEVICE — PK NEURO DISC 10

## (undated) DEVICE — SPNG GZ WOVN 4X4IN 12PLY 10/BX STRL

## (undated) DEVICE — GLV SURG PREMIERPRO MIC LTX PF SZ6.5 BRN

## (undated) DEVICE — HDRST INTUB GENTLETOUCH SLOT 7IN RT

## (undated) DEVICE — GLV SURG PREMIERPRO MIC LTX PF SZ8 BRN

## (undated) DEVICE — MAYFIELD® DISPOSABLE ADULT SKULL PIN (PLASTIC BASE): Brand: MAYFIELD®

## (undated) DEVICE — DISPOSABLE IRRIGATION BIPOLAR CORD, M1000 TYPE: Brand: KIRWAN

## (undated) DEVICE — TOOL MR8-14MH30 MR8 14CM MATCH 3MM: Brand: MIDAS REX MR8

## (undated) DEVICE — UNDERGLV SURG BIOGEL INDICAT PF 61/2 GRN

## (undated) DEVICE — 3M™ STERI-DRAPE™ INSTRUMENT POUCH 1018: Brand: STERI-DRAPE™

## (undated) DEVICE — BIT DRL VERTEX NAV 2.4MM

## (undated) DEVICE — GLV SURG PREMIERPRO MIC LTX PF SZ7.5 BRN

## (undated) DEVICE — BIT NAV2076 VERT STERILE DRILL BIT 2.9MM: Brand: VERTEX® RECONSTRUCTION SYSTEM

## (undated) DEVICE — SPK TRANSFR TRANSOFIX DBL STRL

## (undated) DEVICE — SPHR MARKR STEALTH STATION

## (undated) DEVICE — ELECTRD BLD EZ CLN STD 2.5IN

## (undated) DEVICE — GLV SURG PREMIERPRO MIC LTX PF SZ7 BRN

## (undated) DEVICE — BLANKT WARM LOWR/BDY 100X120CM

## (undated) DEVICE — RUBBERBAND LF STRL PK/2

## (undated) DEVICE — PENCL ROCKRSWCH MEGADYNE W/HOLSTR 10FT SS

## (undated) DEVICE — BLANKT WARM UPPR/BDY ARM/OUT 57X196CM

## (undated) DEVICE — DRILL BIT G3606010 2.4MM

## (undated) DEVICE — DRSNG WND GZ PAD BORDERED 4X8IN STRL

## (undated) DEVICE — SUT ETHLN 3/0 FS1 30IN 669H

## (undated) DEVICE — STRAP POSTN KN/BDY FM 5X72IN DISP

## (undated) DEVICE — Device

## (undated) DEVICE — IRRIGATOR BULB ASEPTO 60CC STRL

## (undated) DEVICE — ACCY PA700 LUBRICANT DIFFUSER MR7 4 PACK: Brand: MIDAS REX

## (undated) DEVICE — DRSNG WND BORDR/ADHS NONADHR/GZ LF 4X4IN STRL

## (undated) DEVICE — CONN TBG Y 5 IN 1 LF STRL

## (undated) DEVICE — TOOL 10MH17 LEGEND 10CM 1.7MM MH: Brand: MIDAS REX™